# Patient Record
Sex: FEMALE | Race: WHITE | NOT HISPANIC OR LATINO | Employment: PART TIME | URBAN - METROPOLITAN AREA
[De-identification: names, ages, dates, MRNs, and addresses within clinical notes are randomized per-mention and may not be internally consistent; named-entity substitution may affect disease eponyms.]

---

## 2022-10-02 ENCOUNTER — HOSPITAL ENCOUNTER (INPATIENT)
Facility: HOSPITAL | Age: 57
LOS: 3 days | Discharge: HOME HEALTH | DRG: 617 | End: 2022-10-07
Attending: EMERGENCY MEDICINE | Admitting: STUDENT IN AN ORGANIZED HEALTH CARE EDUCATION/TRAINING PROGRAM
Payer: COMMERCIAL

## 2022-10-02 ENCOUNTER — OFFICE VISIT (OUTPATIENT)
Dept: URGENT CARE | Facility: CLINIC | Age: 57
DRG: 617 | End: 2022-10-02
Payer: COMMERCIAL

## 2022-10-02 VITALS
SYSTOLIC BLOOD PRESSURE: 144 MMHG | HEART RATE: 103 BPM | RESPIRATION RATE: 20 BRPM | BODY MASS INDEX: 27.89 KG/M2 | TEMPERATURE: 98 F | WEIGHT: 184 LBS | HEIGHT: 68 IN | DIASTOLIC BLOOD PRESSURE: 87 MMHG | OXYGEN SATURATION: 100 %

## 2022-10-02 DIAGNOSIS — L08.9 DIABETIC INFECTION OF RIGHT FOOT: Primary | ICD-10-CM

## 2022-10-02 DIAGNOSIS — L08.9 TOE INFECTION: Primary | ICD-10-CM

## 2022-10-02 DIAGNOSIS — I82.409 DVT (DEEP VENOUS THROMBOSIS): ICD-10-CM

## 2022-10-02 DIAGNOSIS — M79.674 PAIN OF TOE OF RIGHT FOOT: ICD-10-CM

## 2022-10-02 DIAGNOSIS — E11.9 DIABETES MELLITUS, NEW ONSET: ICD-10-CM

## 2022-10-02 DIAGNOSIS — E11.628 DIABETIC INFECTION OF RIGHT FOOT: Primary | ICD-10-CM

## 2022-10-02 DIAGNOSIS — L03.90 CELLULITIS: ICD-10-CM

## 2022-10-02 DIAGNOSIS — M86.9 OSTEOMYELITIS: ICD-10-CM

## 2022-10-02 DIAGNOSIS — M86.9 OSTEOMYELITIS OF TOE: ICD-10-CM

## 2022-10-02 LAB
ALBUMIN SERPL-MCNC: 3.6 GM/DL (ref 3.5–5)
ALBUMIN/GLOB SERPL: 0.7 RATIO (ref 1.1–2)
ALP SERPL-CCNC: 119 UNIT/L (ref 40–150)
ALT SERPL-CCNC: 13 UNIT/L (ref 0–55)
AST SERPL-CCNC: 15 UNIT/L (ref 5–34)
BASOPHILS # BLD AUTO: 0.03 X10(3)/MCL (ref 0–0.2)
BASOPHILS NFR BLD AUTO: 0.4 %
BILIRUBIN DIRECT+TOT PNL SERPL-MCNC: 0.8 MG/DL
BUN SERPL-MCNC: 10.5 MG/DL (ref 9.8–20.1)
CALCIUM SERPL-MCNC: 10.8 MG/DL (ref 8.4–10.2)
CHLORIDE SERPL-SCNC: 96 MMOL/L (ref 98–107)
CO2 SERPL-SCNC: 25 MMOL/L (ref 22–29)
CREAT SERPL-MCNC: 0.72 MG/DL (ref 0.55–1.02)
CRP SERPL-MCNC: 185 MG/L
EOSINOPHIL # BLD AUTO: 0.17 X10(3)/MCL (ref 0–0.9)
EOSINOPHIL NFR BLD AUTO: 2.1 %
ERYTHROCYTE [DISTWIDTH] IN BLOOD BY AUTOMATED COUNT: 11.6 % (ref 11.5–17)
ERYTHROCYTE [SEDIMENTATION RATE] IN BLOOD: 70 MM/HR (ref 0–20)
EST. AVERAGE GLUCOSE BLD GHB EST-MCNC: 263.3 MG/DL
GFR SERPLBLD CREATININE-BSD FMLA CKD-EPI: >60 MLS/MIN/1.73/M2
GLOBULIN SER-MCNC: 5.1 GM/DL (ref 2.4–3.5)
GLUCOSE SERPL-MCNC: 234 MG/DL (ref 74–100)
HBA1C MFR BLD: 10.8 %
HCT VFR BLD AUTO: 47.4 % (ref 37–47)
HGB BLD-MCNC: 15.8 GM/DL (ref 12–16)
IMM GRANULOCYTES # BLD AUTO: 0.02 X10(3)/MCL (ref 0–0.04)
IMM GRANULOCYTES NFR BLD AUTO: 0.3 %
LYMPHOCYTES # BLD AUTO: 2.37 X10(3)/MCL (ref 0.6–4.6)
LYMPHOCYTES NFR BLD AUTO: 29.7 %
MCH RBC QN AUTO: 29.9 PG (ref 27–31)
MCHC RBC AUTO-ENTMCNC: 33.3 MG/DL (ref 33–36)
MCV RBC AUTO: 89.8 FL (ref 80–94)
MONOCYTES # BLD AUTO: 0.68 X10(3)/MCL (ref 0.1–1.3)
MONOCYTES NFR BLD AUTO: 8.5 %
NEUTROPHILS # BLD AUTO: 4.7 X10(3)/MCL (ref 2.1–9.2)
NEUTROPHILS NFR BLD AUTO: 59 %
NRBC BLD AUTO-RTO: 0 %
PLATELET # BLD AUTO: 276 X10(3)/MCL (ref 130–400)
PMV BLD AUTO: 11.3 FL (ref 7.4–10.4)
POCT GLUCOSE: 192 MG/DL (ref 70–110)
POCT GLUCOSE: 210 MG/DL (ref 70–110)
POCT GLUCOSE: 221 MG/DL (ref 70–110)
POTASSIUM SERPL-SCNC: 3.7 MMOL/L (ref 3.5–5.1)
PROT SERPL-MCNC: 8.7 GM/DL (ref 6.4–8.3)
RBC # BLD AUTO: 5.28 X10(6)/MCL (ref 4.2–5.4)
SARS-COV-2 RDRP RESP QL NAA+PROBE: NEGATIVE
SODIUM SERPL-SCNC: 139 MMOL/L (ref 136–145)
WBC # SPEC AUTO: 8 X10(3)/MCL (ref 4.5–11.5)

## 2022-10-02 PROCEDURE — 85651 RBC SED RATE NONAUTOMATED: CPT | Performed by: EMERGENCY MEDICINE

## 2022-10-02 PROCEDURE — 63600175 PHARM REV CODE 636 W HCPCS

## 2022-10-02 PROCEDURE — 96372 THER/PROPH/DIAG INJ SC/IM: CPT

## 2022-10-02 PROCEDURE — 87635 SARS-COV-2 COVID-19 AMP PRB: CPT | Performed by: EMERGENCY MEDICINE

## 2022-10-02 PROCEDURE — 25000003 PHARM REV CODE 250

## 2022-10-02 PROCEDURE — G0378 HOSPITAL OBSERVATION PER HR: HCPCS

## 2022-10-02 PROCEDURE — 25000003 PHARM REV CODE 250: Performed by: EMERGENCY MEDICINE

## 2022-10-02 PROCEDURE — 83036 HEMOGLOBIN GLYCOSYLATED A1C: CPT | Performed by: EMERGENCY MEDICINE

## 2022-10-02 PROCEDURE — S0030 INJECTION, METRONIDAZOLE: HCPCS

## 2022-10-02 PROCEDURE — 96361 HYDRATE IV INFUSION ADD-ON: CPT

## 2022-10-02 PROCEDURE — 25500020 PHARM REV CODE 255: Performed by: INTERNAL MEDICINE

## 2022-10-02 PROCEDURE — 99213 OFFICE O/P EST LOW 20 MIN: CPT | Mod: S$PBB,,, | Performed by: NURSE PRACTITIONER

## 2022-10-02 PROCEDURE — 36415 COLL VENOUS BLD VENIPUNCTURE: CPT | Performed by: EMERGENCY MEDICINE

## 2022-10-02 PROCEDURE — 96365 THER/PROPH/DIAG IV INF INIT: CPT

## 2022-10-02 PROCEDURE — 96367 TX/PROPH/DG ADDL SEQ IV INF: CPT

## 2022-10-02 PROCEDURE — 96366 THER/PROPH/DIAG IV INF ADDON: CPT

## 2022-10-02 PROCEDURE — 99285 EMERGENCY DEPT VISIT HI MDM: CPT | Mod: 25,27

## 2022-10-02 PROCEDURE — 94760 N-INVAS EAR/PLS OXIMETRY 1: CPT

## 2022-10-02 PROCEDURE — 99213 PR OFFICE/OUTPT VISIT, EST, LEVL III, 20-29 MIN: ICD-10-PCS | Mod: S$PBB,,, | Performed by: NURSE PRACTITIONER

## 2022-10-02 PROCEDURE — 80053 COMPREHEN METABOLIC PANEL: CPT | Performed by: EMERGENCY MEDICINE

## 2022-10-02 PROCEDURE — 99204 OFFICE O/P NEW MOD 45 MIN: CPT | Mod: PBBFAC,25 | Performed by: NURSE PRACTITIONER

## 2022-10-02 PROCEDURE — 82962 GLUCOSE BLOOD TEST: CPT

## 2022-10-02 PROCEDURE — 86140 C-REACTIVE PROTEIN: CPT | Performed by: EMERGENCY MEDICINE

## 2022-10-02 PROCEDURE — 63600175 PHARM REV CODE 636 W HCPCS: Performed by: EMERGENCY MEDICINE

## 2022-10-02 PROCEDURE — 85025 COMPLETE CBC W/AUTO DIFF WBC: CPT | Performed by: EMERGENCY MEDICINE

## 2022-10-02 PROCEDURE — 87040 BLOOD CULTURE FOR BACTERIA: CPT | Performed by: EMERGENCY MEDICINE

## 2022-10-02 RX ORDER — IBUPROFEN 200 MG
24 TABLET ORAL
Status: DISCONTINUED | OUTPATIENT
Start: 2022-10-02 | End: 2022-10-07 | Stop reason: HOSPADM

## 2022-10-02 RX ORDER — ACETAMINOPHEN 325 MG/1
650 TABLET ORAL EVERY 6 HOURS PRN
Status: DISCONTINUED | OUTPATIENT
Start: 2022-10-02 | End: 2022-10-07 | Stop reason: HOSPADM

## 2022-10-02 RX ORDER — ENOXAPARIN SODIUM 100 MG/ML
40 INJECTION SUBCUTANEOUS EVERY 24 HOURS
Status: DISCONTINUED | OUTPATIENT
Start: 2022-10-02 | End: 2022-10-07 | Stop reason: HOSPADM

## 2022-10-02 RX ORDER — LABETALOL HCL 20 MG/4 ML
10 SYRINGE (ML) INTRAVENOUS
Status: DISCONTINUED | OUTPATIENT
Start: 2022-10-02 | End: 2022-10-07 | Stop reason: HOSPADM

## 2022-10-02 RX ORDER — INSULIN ASPART 100 [IU]/ML
5 INJECTION, SOLUTION INTRAVENOUS; SUBCUTANEOUS
Status: DISCONTINUED | OUTPATIENT
Start: 2022-10-02 | End: 2022-10-07 | Stop reason: HOSPADM

## 2022-10-02 RX ORDER — SODIUM CHLORIDE 0.9 % (FLUSH) 0.9 %
10 SYRINGE (ML) INJECTION
Status: DISCONTINUED | OUTPATIENT
Start: 2022-10-02 | End: 2022-10-07 | Stop reason: HOSPADM

## 2022-10-02 RX ORDER — SODIUM CHLORIDE 9 MG/ML
INJECTION, SOLUTION INTRAVENOUS CONTINUOUS
Status: DISCONTINUED | OUTPATIENT
Start: 2022-10-02 | End: 2022-10-06

## 2022-10-02 RX ORDER — HYDRALAZINE HYDROCHLORIDE 20 MG/ML
10 INJECTION INTRAMUSCULAR; INTRAVENOUS
Status: DISCONTINUED | OUTPATIENT
Start: 2022-10-02 | End: 2022-10-07 | Stop reason: HOSPADM

## 2022-10-02 RX ORDER — GLUCAGON 1 MG
1 KIT INJECTION
Status: DISCONTINUED | OUTPATIENT
Start: 2022-10-02 | End: 2022-10-07 | Stop reason: HOSPADM

## 2022-10-02 RX ORDER — IBUPROFEN 200 MG
16 TABLET ORAL
Status: DISCONTINUED | OUTPATIENT
Start: 2022-10-02 | End: 2022-10-07 | Stop reason: HOSPADM

## 2022-10-02 RX ORDER — INSULIN ASPART 100 [IU]/ML
0-5 INJECTION, SOLUTION INTRAVENOUS; SUBCUTANEOUS
Status: DISCONTINUED | OUTPATIENT
Start: 2022-10-02 | End: 2022-10-07 | Stop reason: HOSPADM

## 2022-10-02 RX ORDER — TALC
6 POWDER (GRAM) TOPICAL NIGHTLY PRN
Status: DISCONTINUED | OUTPATIENT
Start: 2022-10-02 | End: 2022-10-07 | Stop reason: HOSPADM

## 2022-10-02 RX ORDER — SODIUM CHLORIDE 9 MG/ML
1000 INJECTION, SOLUTION INTRAVENOUS ONCE
Status: COMPLETED | OUTPATIENT
Start: 2022-10-02 | End: 2022-10-02

## 2022-10-02 RX ORDER — OXYCODONE HYDROCHLORIDE 5 MG/1
5 TABLET ORAL EVERY 6 HOURS PRN
Status: DISCONTINUED | OUTPATIENT
Start: 2022-10-02 | End: 2022-10-07 | Stop reason: HOSPADM

## 2022-10-02 RX ORDER — METRONIDAZOLE 500 MG/100ML
500 INJECTION, SOLUTION INTRAVENOUS
Status: DISCONTINUED | OUTPATIENT
Start: 2022-10-02 | End: 2022-10-07 | Stop reason: HOSPADM

## 2022-10-02 RX ORDER — OXYCODONE HYDROCHLORIDE 5 MG/1
10 TABLET ORAL EVERY 6 HOURS PRN
Status: DISCONTINUED | OUTPATIENT
Start: 2022-10-02 | End: 2022-10-07 | Stop reason: HOSPADM

## 2022-10-02 RX ORDER — HYDROMORPHONE HYDROCHLORIDE 1 MG/ML
1 INJECTION, SOLUTION INTRAMUSCULAR; INTRAVENOUS; SUBCUTANEOUS EVERY 6 HOURS PRN
Status: DISCONTINUED | OUTPATIENT
Start: 2022-10-02 | End: 2022-10-07 | Stop reason: HOSPADM

## 2022-10-02 RX ADMIN — VANCOMYCIN HYDROCHLORIDE 1500 MG: 1 INJECTION, POWDER, LYOPHILIZED, FOR SOLUTION INTRAVENOUS at 03:10

## 2022-10-02 RX ADMIN — CEFEPIME 1 G: 1 INJECTION, POWDER, FOR SOLUTION INTRAMUSCULAR; INTRAVENOUS at 01:10

## 2022-10-02 RX ADMIN — METRONIDAZOLE 500 MG: 5 INJECTION, SOLUTION INTRAVENOUS at 05:10

## 2022-10-02 RX ADMIN — INSULIN ASPART 4 UNITS: 100 INJECTION, SOLUTION INTRAVENOUS; SUBCUTANEOUS at 05:10

## 2022-10-02 RX ADMIN — INSULIN DETEMIR 10 UNITS: 100 INJECTION, SOLUTION SUBCUTANEOUS at 09:10

## 2022-10-02 RX ADMIN — SODIUM CHLORIDE 1000 ML: 9 INJECTION, SOLUTION INTRAVENOUS at 05:10

## 2022-10-02 RX ADMIN — SODIUM CHLORIDE: 9 INJECTION, SOLUTION INTRAVENOUS at 07:10

## 2022-10-02 RX ADMIN — IOPAMIDOL 100 ML: 755 INJECTION, SOLUTION INTRAVENOUS at 05:10

## 2022-10-02 RX ADMIN — ENOXAPARIN SODIUM 40 MG: 40 INJECTION SUBCUTANEOUS at 05:10

## 2022-10-02 RX ADMIN — INSULIN ASPART 2 UNITS: 100 INJECTION, SOLUTION INTRAVENOUS; SUBCUTANEOUS at 09:10

## 2022-10-02 RX ADMIN — CEFEPIME 2 G: 2 INJECTION, POWDER, FOR SOLUTION INTRAVENOUS at 09:10

## 2022-10-02 NOTE — ED PROVIDER NOTES
"Encounter Date: 10/2/2022       History     Chief Complaint   Patient presents with    foot pain     Sent from urgent care for right 3rd toe infection to rule out osteomyelitis. Noticed a "sore" on it since 9/18.     She had 3 children and and gestational diabetes, no other significant past medical or surgical history but has not had any healthcare contact in many years.  Nonsmoker, nondrinker, takes no medications, no allergies.  Cares for her  who has had a stroke and 1 son who has had a brain injury.  Recent trouble with a bunion on her right middle toe, possibly exacerbated by a recent drive and she became aware in recent days of some soreness of the right lower leg and 3rd toe, severe infection has developed involving the 2nd and 3rd toes at this point.  It is a little unclear when she 1st developed any lesion but she says she has had some type of sore that seem to be getting better and worse for at least 2 or 3 weeks.  No fever, chills, or sweats.  Seen at a local urgent care and referred here for further evaluation.  No recent antibiotic exposure.  No other complaints.    The history is provided by the patient. No  was used.   Review of patient's allergies indicates:  No Known Allergies  No past medical history on file.  No past surgical history on file.  No family history on file.  Social History     Tobacco Use    Smoking status: Never    Smokeless tobacco: Never   Substance Use Topics    Alcohol use: Not Currently    Drug use: Never     Review of Systems   Constitutional:  Negative for activity change, fatigue and fever.   HENT:  Negative for congestion, ear pain, facial swelling, nosebleeds, sinus pressure and sore throat.    Eyes:  Negative for pain, discharge, redness and visual disturbance.   Respiratory:  Negative for cough, choking, chest tightness, shortness of breath and wheezing.    Cardiovascular:  Negative for chest pain, palpitations and leg swelling. "   Gastrointestinal:  Negative for abdominal distention, abdominal pain, nausea and vomiting.   Endocrine: Negative for heat intolerance, polydipsia and polyuria.   Genitourinary:  Negative for difficulty urinating, dysuria, flank pain, hematuria and urgency.   Musculoskeletal:  Negative for back pain, gait problem, joint swelling and myalgias.   Skin:  Positive for color change and wound. Negative for rash.   Allergic/Immunologic: Negative for environmental allergies and food allergies.   Neurological:  Negative for dizziness, weakness, numbness and headaches.   Hematological:  Negative for adenopathy. Does not bruise/bleed easily.   Psychiatric/Behavioral:  Negative for agitation and behavioral problems. The patient is not nervous/anxious.    All other systems reviewed and are negative.    Physical Exam     Initial Vitals [10/02/22 1214]   BP Pulse Resp Temp SpO2   (!) 151/103 108 20 98.2 °F (36.8 °C) 100 %      MAP       --         Physical Exam    Nursing note and vitals reviewed.  Constitutional: She appears well-developed and well-nourished. She is not diaphoretic. No distress.   HENT:   Head: Normocephalic and atraumatic.   Mouth/Throat: No oropharyngeal exudate.   Eyes: Conjunctivae and EOM are normal. Pupils are equal, round, and reactive to light. Right eye exhibits no discharge. Left eye exhibits no discharge. No scleral icterus.   Neck: Neck supple. No thyromegaly present. No tracheal deviation present. No JVD present.   Normal range of motion.  Cardiovascular:  Normal rate, regular rhythm, normal heart sounds and intact distal pulses.     Exam reveals no gallop and no friction rub.       No murmur heard.  Pulmonary/Chest: Breath sounds normal. No stridor. No respiratory distress. She has no wheezes. She has no rhonchi. She has no rales. She exhibits no tenderness.   Abdominal: Abdomen is soft. Bowel sounds are normal. She exhibits no distension and no mass. There is no abdominal tenderness. There is no  rebound and no guarding.   Musculoskeletal:         General: No tenderness or edema. Normal range of motion.      Cervical back: Normal range of motion and neck supple.     Neurological: She is alert and oriented to person, place, and time. She has normal strength.   Skin: Skin is warm and dry. No rash and no abscess noted. There is erythema.   Fairly severe cellulitis involves the right 2nd toe, fairly severe cellulitis with skin breakdown involves the right middle toe, moderately severe cellulitis of the right foot adjacent to the base of both of those toes with mild cellulitis changes extending proximally involving most of the foot and part of the lower leg on the right.  No gas or crepitus.  No lymphangitis.  No focal abscess.  Some underlying bunion deformity changes evident of the middle toe.  Exam is concerning for underlying osteomyelitis. See photos.    Psychiatric: She has a normal mood and affect. Her behavior is normal. Judgment and thought content normal.                       ED Course   Procedures  Labs Reviewed   COMPREHENSIVE METABOLIC PANEL - Abnormal; Notable for the following components:       Result Value    Chloride 96 (*)     Glucose Level 234 (*)     Calcium Level Total 10.8 (*)     Protein Total 8.7 (*)     Globulin 5.1 (*)     Albumin/Globulin Ratio 0.7 (*)     All other components within normal limits   SEDIMENTATION RATE, AUTOMATED - Abnormal; Notable for the following components:    Sed Rate 70 (*)     All other components within normal limits   C-REACTIVE PROTEIN - Abnormal; Notable for the following components:    C-Reactive Protein 185.00 (*)     All other components within normal limits   HEMOGLOBIN A1C - Abnormal; Notable for the following components:    Hemoglobin A1c 10.8 (*)     All other components within normal limits   CBC WITH DIFFERENTIAL - Abnormal; Notable for the following components:    Hct 47.4 (*)     MPV 11.3 (*)     All other components within normal limits   POCT  GLUCOSE - Abnormal; Notable for the following components:    POCT Glucose 221 (*)     All other components within normal limits   SARS-COV-2 RNA AMPLIFICATION, QUAL - Normal   BLOOD CULTURE [facility]   BLOOD CULTURE [facility]   CBC W/ AUTO DIFFERENTIAL    Narrative:     The following orders were created for panel order CBC auto differential.  Procedure                               Abnormality         Status                     ---------                               -----------         ------                     CBC with Differential[[id number]]        Abnormal            Final result                 Please view results for these tests on the individual orders.   EXTRA TUBES    Narrative:     The following orders were created for panel order EXTRA TUBES.  Procedure                               Abnormality         Status                     ---------                               -----------         ------                     Light Blue Top Hold[[id number]]                                                         Red Top Hold[[id number]]                                                                Gold Top Hold[[id number]]                                                               Gold Top Hold[[id number]]                                                               Pink Top Hold[[id number]]                                                                 Please view results for these tests on the individual orders.   LIGHT BLUE TOP HOLD   RED TOP HOLD   GOLD TOP HOLD   GOLD TOP HOLD   PINK TOP HOLD   POCT GLUCOSE, HAND-HELD DEVICE          Imaging Results              X-Ray Foot Complete Right (Final result)  Result time 10/02/22 13:51:22      Final result by Kirill Gorman MD (10/02/22 13:51:22)                   Impression:      No acute osseous abnormality identified.      Electronically signed by: Kirill Gorman  Date:    10/02/2022  Time:    13:51               Narrative:    EXAMINATION:  XR FOOT COMPLETE 3 VIEW  RIGHT    CLINICAL HISTORY:  Cellulitis, unspecified    TECHNIQUE:  Three views    COMPARISON:  None available    FINDINGS:  There is prominent hallux valgus deformity.  Degenerative osteoarthritic changes involve the tarsometatarsal articulations and there is a prominent calcaneal enthesophyte.  No osteolytic destructive changes identified to suggest osteomyelitis.  There are soft tissue inflammations with scattered small air locules.                                       Medications   ceFEPIme (MAXIPIME) 1 g in sodium chloride 0.9 % 50 mL IVPB (MB+) (1 g Intravenous New Bag 10/2/22 3667)       2:13 PM Discussed with Internal Medicine, will see in the ER and admit.  Although plain films do not yet show destructive changes, high likelihood that there is early osteomyelitis already developed.  Counseled patient in detail.                             Clinical Impression:   Final diagnoses:  [L03.90] Cellulitis - right foot & lower leg  [E11.628, L08.9] Diabetic infection of right foot (Primary)  [E11.9] Diabetes mellitus, new onset      ED Disposition Condition    Admit Stable                Bandar Yousif MD  10/02/22 7878

## 2022-10-02 NOTE — CONSULTS
[facility] General Surgery Service  ADMIT / CONSULT / H&P NOTE  Date: 10/2/2022    CC:   Maribel Araiza is a 57 y.o. female presenting with a wound of the right 3rd toe with surrounding cellulitis. General surgery consulted for concern of underlying osteomyelitis.    HPI:   Maribel Araiza is a 56 yo female with a PMH of gestational diabetes, and current diabetes (diagnosed on this visit, a1c 10.8) who presented to ED 10/2 with a right 3rd toe wound and surrounding cellulitis. Patient states she first noticed the wound last night (10/1) and it has gotten worse. Patient has a hx of wounds/sores to toes on that foot due to a bunion on that foot crowding her toes in shoes. Patient notes that she has been placing Neosporin on wound. Patient in no acute distress during interview, however is under a great deal of emotional stress currently due to very recent family factors. Denies f/c, n/v, abdominal pain, chest pain, diarrhea, cough, SOB.    ROS:  Review of Systems   Constitutional:  Negative for chills and fever.   HENT:  Negative for congestion and sore throat.    Eyes:  Negative for blurred vision and double vision.   Respiratory:  Negative for cough and shortness of breath.    Cardiovascular:  Negative for chest pain and claudication.   Gastrointestinal:  Negative for abdominal pain, constipation, diarrhea, heartburn, nausea and vomiting.   Genitourinary:  Negative for dysuria and urgency.   Musculoskeletal:  Negative for falls and myalgias.   Skin:  Negative for rash.        Wound to right 3rd toe   Neurological:  Negative for dizziness, tingling, sensory change and headaches.   Endo/Heme/Allergies:  Does not bruise/bleed easily.     PMH:   No past medical history on file.    PSH:   No past surgical history on file.    FamHx:   No family history on file.    SocHx:  Social History     Socioeconomic History    Marital status:    Tobacco Use    Smoking status: Never    Smokeless tobacco: Never   Substance and Sexual  "Activity    Alcohol use: Not Currently    Drug use: Never       Allergies:   Review of patient's allergies indicates:  No Known Allergies    Medications:  No current facility-administered medications on file prior to encounter.     No current outpatient medications on file prior to encounter.     Objective:    VITAL SIGNS: 24 HR MIN & MAX LAST    Temp  Min: 98.1 °F (36.7 °C)  Max: 98.6 °F (37 °C)  98.1 °F (36.7 °C)        BP  Min: 141/88  Max: 151/103  (!) 148/88     Pulse  Min: 92  Max: 108  97     Resp  Min: 20  Max: 20  20    SpO2  Min: 99 %  Max: 100 %  99 %      HT: 5' 8" (172.7 cm)  WT: 83.5 kg (184 lb)  BMI: 28       Physical Exam:  Gen: NAD, AAOx3  CV: extremities wwp, regular rate, palpable radials  Pulm: nonlabored, no increased wob, equal chest rise b/l   Abd: s/nt/nd   Wounds: Right 3rd toe with large white wound on dorsal surface appearing like purulence at first glance. Unable to express purulent fluid from wound with pressure and with breaking skin. Skin appears macerated likely due to Neosporin use. Patient denies pain throughout exam. Additionally, right 4th toe swollen and erythematous without open wound/drainage    Results  Recent Labs   Lab 10/02/22  1318 10/02/22  1327   WBC 8.0  --    HGB 15.8  --    HCT 47.4*  --      --    NA  --  139   CHLORIDE  --  96*   CO2  --  25   BUN  --  10.5   CREATININE  --  0.72   GLUCOSE  --  234*   CALCIUM  --  10.8*   ALKPHOS  --  119       Imaging:  Right Foot X-ray 10/2  There is prominent hallux valgus deformity.  Degenerative osteoarthritic changes involve the tarsometatarsal articulations and there is a prominent calcaneal enthesophyte.  No osteolytic destructive changes identified to suggest osteomyelitis.  There are soft tissue inflammations with scattered small air locules.     Impression:  No acute osseous abnormality identified.    A/P:   Maribel Ariaza is a 57 y.o. female with a PMH of gestational diabetes and recently diagnosed DM2 noted during " this visit who presents with a right 3rd toe wound with surrounding cellulitis.    - Agree with primary team abx  - No acute surgical intervention at this time  - X-ray shows no signs of osteo, however will await MRI results for further planning  - Will continue to follow  - Will consult wound care tomorrow morning  - Ordered scaling pain regimen    Kevin Carnes MD  [employer] General Surgery PGY-1

## 2022-10-02 NOTE — PROGRESS NOTES
"Subjective:       Patient ID: Maribel Araiza is a 57 y.o. female.    Vitals:  height is 5' 8" (1.727 m) and weight is 83.5 kg (184 lb). Her oral temperature is 98.4 °F (36.9 °C). Her blood pressure is 144/87 (abnormal) and her pulse is 103. Her respiration is 20 and oxygen saturation is 100%.     Chief Complaint: Right 3rd toe sore (Swelling, pain, open wound)    Patient is a 57-year-old female, here today for right 3rd toe swelling, pain that she states has been there over a week.  Patient unsure exactly when it started.  States she does get source to her feet, but they usually heal on their own.  States she became concerned because the toe next to it started swelling and she started having some swelling to her calf area.       Constitution: Negative.   Cardiovascular: Negative.    Respiratory: Negative.     Skin:  Positive for wound.     Objective:      Physical Exam   Constitutional: She is oriented to person, place, and time. She appears well-developed.   HENT:   Head: Normocephalic.   Eyes: Conjunctivae and EOM are normal. Pupils are equal, round, and reactive to light.   Neck: Neck supple.   Cardiovascular: Normal rate, regular rhythm and normal heart sounds.   Pulmonary/Chest: Effort normal and breath sounds normal.   Musculoskeletal: Normal range of motion.         General: Normal range of motion.   Neurological: She is alert and oriented to person, place, and time.   Skin: Skin is warm.         Comments: Wound to R 3rd toe, see photo.    Psychiatric: Her behavior is normal.   Vitals reviewed.                Assessment:       1. Toe infection    2. Pain of toe of right foot            No results found for any previous visit.        No results found.   Plan:         Discussed with patient, will transfer to ED for further eval/treatment.  [place] [facility] [place]    Toe infection  -     Refer to Emergency Dept.    Pain of toe of right foot  -     Refer to Emergency Dept.             "

## 2022-10-02 NOTE — H&P
"[facility] Medicine Wards   History & Physical Note     Resident Team: [facility] Medicine List 1  Attending Physician: Deo Brunner MD  Resident: Arden Grajeda MD  Intern: Dayton Anton     Date of Admit: 10/2/2022    Chief Complaint:     foot pain (Sent from urgent care for right 3rd toe infection to rule out osteomyelitis. Noticed a "sore" on it since .)       Subjective:      History of Present Illness:  Maribel Araiza is a 57 y.o. female with a history of gestational diabetes who presented to [facility] ED on 10/2/2022  with complaint of right foot 3rd toe wound. Patient reports that she first noticed the toe wound yesterday night. She did note notice the wound the night prior but does not having a foot sore in the same area a couple weeks ago. She normally has foot sores in that area due to her arched feet that scrapes against her shoes. She says the sores come and go but have never gotten this bad. She denies any pain in her toes but does complain of pain in her right calf. She says that she also get some swelling in her feet when she goes for a long drive. Of note, patient recently returned from [place] for her father's  which was a 15 hr drive this past . Patient was in emotional distress during the interview due to other family issues as well. Patient reports no past medical history except gestational diabetes during her pregnancies. She has not seen a physician in years. She says she does not smoke, only drinks alcohol socially, and denies any other drug use. She denies having any fevers, chills, SOB, chest pain, abdominal pain, dysuria, constipation or diarrhea.     Past Medical History:   has no past medical history on file.     Past Surgical History:   has no past surgical history on file.     Family History:  family history is not on file.     Social History:   reports that she has never smoked. She has never used smokeless tobacco. She reports that she does not currently use alcohol. She " reports that she does not use drugs.     Allergies:  has No Known Allergies.     Home Medications:  Prior to Admission medications    Not on File     Review of Systems:  Review of Systems   All other systems reviewed and are negative.     Objective:     Vital Signs (Most Recent):  Temp: 98.6 °F (37 °C) (10/02/22 1244)  Pulse: 92 (10/02/22 1244)  Resp: 20 (10/02/22 1244)  BP: (!) 141/88 (10/02/22 1244)  SpO2: 100 % (10/02/22 1214)   Vital Signs (24h Range):  Temp:  [98.2 °F (36.8 °C)-98.6 °F (37 °C)] 98.6 °F (37 °C)  Pulse:  [] 92  Resp:  [20] 20  SpO2:  [100 %] 100 %  BP: (141-151)/() 141/88     Physical Examination:  Physical Exam  Vitals reviewed.   Constitutional:       Appearance: Normal appearance.   HENT:      Head: Normocephalic and atraumatic.   Eyes:      Extraocular Movements: Extraocular movements intact.      Conjunctiva/sclera: Conjunctivae normal.      Pupils: Pupils are equal, round, and reactive to light.   Cardiovascular:      Rate and Rhythm: Regular rhythm. Tachycardia present.      Pulses: Normal pulses.      Heart sounds: No murmur heard.  Pulmonary:      Effort: Pulmonary effort is normal. No respiratory distress.      Breath sounds: Normal breath sounds.   Chest:      Chest wall: No tenderness.   Abdominal:      General: Abdomen is flat. Bowel sounds are normal. There is no distension.      Palpations: Abdomen is soft.      Tenderness: There is no abdominal tenderness.   Musculoskeletal:         General: Normal range of motion.      Cervical back: Normal range of motion.   Skin:     General: Skin is warm.      Capillary Refill: Capillary refill takes less than 2 seconds.      Comments: Right foot swelling up to mid-calf; right calf mildly tender to palpation; right foot 2nd digit enlarged and erythematous/inflamed; right foot 3rd digit enlarged expressing milky purulent material; toes are not tender to palpation   Neurological:      General: No focal deficit present.      Mental  Status: She is alert and oriented to person, place, and time. Mental status is at baseline.          Laboratory:  Most Recent Data:  CBC:   Recent Labs   Lab 10/02/22  1318   WBC 8.0   HGB 15.8   HCT 47.4*        CMP:   Recent Labs   Lab 10/02/22  1327   CALCIUM 10.8*   ALBUMIN 3.6      K 3.7   CO2 25   BUN 10.5   CREATININE 0.72   ALKPHOS 119   ALT 13   AST 15   BILITOT 0.8     Recent Lab Results         10/02/22  1356   10/02/22  1327   10/02/22  1318   10/02/22  1305        Albumin/Globulin Ratio   0.7           Albumin   3.6           Alkaline Phosphatase   119           ALT   13           AST   15           Baso #     0.03         Basophil %     0.4         BILIRUBIN TOTAL   0.8           BUN   10.5           Calcium   10.8           Chloride   96           CO2   25           ID NOW COVID-19, (IRAIS) Negative             Creatinine   0.72           CRP   185.00           eGFR   >60           Eos #     0.17         Eosinophil %     2.1         Estimated Avg Glucose     263.3         Globulin, Total   5.1           Glucose   234           Hematocrit     47.4         Hemoglobin     15.8         Hemoglobin A1C External     10.8         Immature Grans (Abs)     0.02         Immature Granulocytes     0.3         Lymph #     2.37         LYMPH %     29.7         MCH     29.9         MCHC     33.3         MCV     89.8         Mono #     0.68         Mono %     8.5         MPV     11.3         Neut #     4.7         Neut %     59.0         nRBC     0.0         Platelets     276         POCT Glucose       221       Potassium   3.7           PROTEIN TOTAL   8.7           RBC     5.28         RDW     11.6         Sed Rate   70           Sodium   139           WBC     8.0                 Microbiology Data:  Blood cultures pending    Other Results:    Radiology:  Imaging Results              X-Ray Foot Complete Right (Final result)  Result time 10/02/22 13:51:22      Final result by Kirill Gorman MD (10/02/22  13:51:22)                   Impression:      No acute osseous abnormality identified.      Electronically signed by: Kirill Gorman  Date:    10/02/2022  Time:    13:51               Narrative:    EXAMINATION:  XR FOOT COMPLETE 3 VIEW RIGHT    CLINICAL HISTORY:  Cellulitis, unspecified    TECHNIQUE:  Three views    COMPARISON:  None available    FINDINGS:  There is prominent hallux valgus deformity.  Degenerative osteoarthritic changes involve the tarsometatarsal articulations and there is a prominent calcaneal enthesophyte.  No osteolytic destructive changes identified to suggest osteomyelitis.  There are soft tissue inflammations with scattered small air locules.                                     Lines/Drains/Airways       Peripheral Intravenous Line  Duration                  Peripheral IV - Double Lumen 10/02/22 1230 20 G Anterior;Right Forearm <1 day                  Assessment & Plan:     Right foot cellulitis vs osteomyelitis  - Patient reports acute worsening of right foot sore yesterday  - SIRS 2/4: Afebrile, tachycardic 108, tachypneic 20, WBC 8 on admission  - Elevated inflammatory markers ESR 70 and   - A1c elevated 10.8  - Blood cultures currently pending  - Right foot x-ray showed no osseus abnormalities  - Given dose of Cefepime and Vanc in the ED  - Ordered b/l venous US LE to assess for DVTs  - Ordered CT and MRI of right foot to assess for possible osteomyelitis  - Started on Vanc, Cefepime, and Flagyl  - Started on NS 1L bolus; then start  cc/hr  - Consulted surgery about possible need for debridement    Hyperglycemia  - Reports history of gestational diabetes  - Does not take medications at home and does not check sugars at home  - A1c in the ED was 10.8  - Started on Detemir 10 units nightly and Aspart 5 units with meals  - Started on low dose sliding scale insulin    High blood pressure  - BP elevated 151/103 in the ED  - Patient was anxious due to recently family circumstances  -  Started on PRN labetalol and hydralazine  - Will continue to monitor      CODE STATUS: Full Code  Access: Peripheral  Antibiotics: Vancomycin, Cefepime, Flagyl  Diet: Diabetic  DVT Prophylaxis: Lovenox  GI Prophylaxis: none needed  Fluids: normal saline 100 ml/hr.     Disposition: day 0 of admission for osteomyelitis workup.    Dayton Anton MD  [facility] Internal Medicine, HO-1     -------------------------------------------------------------    Upper Level Resident Addendum    HPI  -agree with above with addition that her swelling in her RLE is notably worse than what she ussually gets after a long trip. Ussually it goes away after one night of elevating her legs. Her swelling this time has not resolved since returning from [place]. Also, in addition to her father's  this past month, her  is currently admitted to the ICU.    Physical Exam  General - Appears comfortable, appropriatley conversive   Mental Status - alert and oriented x 3, speaking in logical, relevant sentences   HEENT - no rhinorrhea   Cardiac - RRR, no murmurs, rubs, or gallops; no edema in LE   Respiratory - breathing comfortably; clear to ascultation bilaterally   Abdominal - nondistended, soft, nontender to palpation   Extremities - on right foot 3rd digit is edematous and purulent. Her 2nd digit is erythematous and edematous. Her foot and distal calf has erythema as well. She is not tender in her toes or foot. Mild tenderness in posterior calf on palpation  Skin - no rashes or bruises seen on skin    Assessment/Plan    Right foot celllulitis vs osteomyelitis  -Agree with above. Additionally, if MRI is positive for osteomyelitis, then will order BLE Arterial US    Hyperglycemia  -A1C elevated. Likely has new diagnosis of diabetes    High Blood pressure  -agree with above. May be 2/2 undiagnosed HTN, stress response to emotions and/or infection    Concern for RLE DVT  -given trip from [place], she's at increased risk of DVT. Well  "Score "-1". Will hold off on anticoagulation and get Venous US tomorrow morning    Zhen Grajeda PGY 3    "

## 2022-10-02 NOTE — PROGRESS NOTES
Pharmacokinetic Initial Assessment: IV Vancomycin    Assessment/Plan:    Initiate intravenous vancomycin with loading dose of 1500 mg once followed by a maintenance dose of vancomycin 1250mg IV every 12 hours  Desired empiric serum trough concentration is 15 to 20 mcg/mL  Draw vancomycin random level on 10/3/22 at 1500.  Pharmacy will continue to follow and monitor vancomycin.      Please contact pharmacy at extension [phone] with any questions regarding this assessment.     Thank you for the consult,   Tatianna Avila       Patient brief summary:  Maribel Araiza is a 57 y.o. female initiated on antimicrobial therapy with IV Vancomycin for treatment of suspected bone/joint infection    Drug Allergies:   Review of patient's allergies indicates:  No Known Allergies    Actual Body Weight:   83.5kg    Renal Function:   Estimated Creatinine Clearance: 98.8 mL/min (based on SCr of 0.72 mg/dL).,     CBC (last 72 hours):  Recent Labs   Lab Result Units 10/02/22  1318   WBC x10(3)/mcL 8.0   Hgb gm/dL 15.8   Hemoglobin A1c % 10.8*   Hct % 47.4*   Platelet x10(3)/mcL 276   Mono % % 8.5   Eos % % 2.1   Basophil % % 0.4       Metabolic Panel (last 72 hours):  Recent Labs   Lab Result Units 10/02/22  1327   Sodium Level mmol/L 139   Potassium Level mmol/L 3.7   Chloride mmol/L 96*   Carbon Dioxide mmol/L 25   Glucose Level mg/dL 234*   Blood Urea Nitrogen mg/dL 10.5   Creatinine mg/dL 0.72   Albumin Level gm/dL 3.6   Bilirubin Total mg/dL 0.8   Alkaline Phosphatase unit/L 119   Aspartate Aminotransferase unit/L 15   Alanine Aminotransferase unit/L 13       Microbiologic Results:  Microbiology Results (last 7 days)       Procedure Component Value Units Date/Time    Blood Culture #1 [[id number]] Collected: 10/02/22 1325    Order Status: Sent Specimen: Blood Updated: 10/02/22 1326    Blood Culture #2 [[id number]] Collected: 10/02/22 1325    Order Status: Sent Specimen: Blood Updated: 10/02/22 1326

## 2022-10-03 LAB
ALBUMIN SERPL-MCNC: 2.5 GM/DL (ref 3.5–5)
ALBUMIN/GLOB SERPL: 0.8 RATIO (ref 1.1–2)
ALP SERPL-CCNC: 83 UNIT/L (ref 40–150)
ALT SERPL-CCNC: 10 UNIT/L (ref 0–55)
AST SERPL-CCNC: 15 UNIT/L (ref 5–34)
BASOPHILS # BLD AUTO: 0.03 X10(3)/MCL (ref 0–0.2)
BASOPHILS NFR BLD AUTO: 0.5 %
BILIRUBIN DIRECT+TOT PNL SERPL-MCNC: 0.3 MG/DL
BUN SERPL-MCNC: 9.6 MG/DL (ref 9.8–20.1)
CALCIUM SERPL-MCNC: 8.7 MG/DL (ref 8.4–10.2)
CHLORIDE SERPL-SCNC: 108 MMOL/L (ref 98–107)
CO2 SERPL-SCNC: 24 MMOL/L (ref 22–29)
CREAT SERPL-MCNC: 0.55 MG/DL (ref 0.55–1.02)
EOSINOPHIL # BLD AUTO: 0.2 X10(3)/MCL (ref 0–0.9)
EOSINOPHIL NFR BLD AUTO: 3.5 %
ERYTHROCYTE [DISTWIDTH] IN BLOOD BY AUTOMATED COUNT: 11.4 % (ref 11.5–17)
GFR SERPLBLD CREATININE-BSD FMLA CKD-EPI: >60 MLS/MIN/1.73/M2
GLOBULIN SER-MCNC: 3.1 GM/DL (ref 2.4–3.5)
GLUCOSE SERPL-MCNC: 133 MG/DL (ref 74–100)
HCT VFR BLD AUTO: 35.5 % (ref 37–47)
HGB BLD-MCNC: 12 GM/DL (ref 12–16)
IMM GRANULOCYTES # BLD AUTO: 0.02 X10(3)/MCL (ref 0–0.04)
IMM GRANULOCYTES NFR BLD AUTO: 0.4 %
LYMPHOCYTES # BLD AUTO: 2.17 X10(3)/MCL (ref 0.6–4.6)
LYMPHOCYTES NFR BLD AUTO: 38.1 %
MAGNESIUM SERPL-MCNC: 2.3 MG/DL (ref 1.6–2.6)
MCH RBC QN AUTO: 30.2 PG (ref 27–31)
MCHC RBC AUTO-ENTMCNC: 33.8 MG/DL (ref 33–36)
MCV RBC AUTO: 89.2 FL (ref 80–94)
MONOCYTES # BLD AUTO: 0.56 X10(3)/MCL (ref 0.1–1.3)
MONOCYTES NFR BLD AUTO: 9.8 %
NEUTROPHILS # BLD AUTO: 2.7 X10(3)/MCL (ref 2.1–9.2)
NEUTROPHILS NFR BLD AUTO: 47.7 %
NRBC BLD AUTO-RTO: 0 %
PHOSPHATE SERPL-MCNC: 3.6 MG/DL (ref 2.3–4.7)
PLATELET # BLD AUTO: 264 X10(3)/MCL (ref 130–400)
PMV BLD AUTO: 10.6 FL (ref 7.4–10.4)
POCT GLUCOSE: 126 MG/DL (ref 70–110)
POCT GLUCOSE: 146 MG/DL (ref 70–110)
POCT GLUCOSE: 189 MG/DL (ref 70–110)
POCT GLUCOSE: 281 MG/DL (ref 70–110)
POTASSIUM SERPL-SCNC: 4.1 MMOL/L (ref 3.5–5.1)
PROT SERPL-MCNC: 5.6 GM/DL (ref 6.4–8.3)
RBC # BLD AUTO: 3.98 X10(6)/MCL (ref 4.2–5.4)
SODIUM SERPL-SCNC: 140 MMOL/L (ref 136–145)
VANCOMYCIN SERPL-MCNC: 7.7 UG/ML (ref 15–20)
WBC # SPEC AUTO: 5.7 X10(3)/MCL (ref 4.5–11.5)

## 2022-10-03 PROCEDURE — G0378 HOSPITAL OBSERVATION PER HR: HCPCS

## 2022-10-03 PROCEDURE — 96367 TX/PROPH/DG ADDL SEQ IV INF: CPT

## 2022-10-03 PROCEDURE — 94760 N-INVAS EAR/PLS OXIMETRY 1: CPT

## 2022-10-03 PROCEDURE — 25500020 PHARM REV CODE 255

## 2022-10-03 PROCEDURE — 84100 ASSAY OF PHOSPHORUS: CPT

## 2022-10-03 PROCEDURE — 96365 THER/PROPH/DIAG IV INF INIT: CPT | Mod: 59

## 2022-10-03 PROCEDURE — 80202 ASSAY OF VANCOMYCIN: CPT

## 2022-10-03 PROCEDURE — 94761 N-INVAS EAR/PLS OXIMETRY MLT: CPT

## 2022-10-03 PROCEDURE — 25000003 PHARM REV CODE 250

## 2022-10-03 PROCEDURE — S0030 INJECTION, METRONIDAZOLE: HCPCS

## 2022-10-03 PROCEDURE — 96366 THER/PROPH/DIAG IV INF ADDON: CPT

## 2022-10-03 PROCEDURE — 83735 ASSAY OF MAGNESIUM: CPT

## 2022-10-03 PROCEDURE — 63600175 PHARM REV CODE 636 W HCPCS

## 2022-10-03 PROCEDURE — 96361 HYDRATE IV INFUSION ADD-ON: CPT

## 2022-10-03 PROCEDURE — A9577 INJ MULTIHANCE: HCPCS

## 2022-10-03 PROCEDURE — 96372 THER/PROPH/DIAG INJ SC/IM: CPT

## 2022-10-03 PROCEDURE — 80053 COMPREHEN METABOLIC PANEL: CPT

## 2022-10-03 PROCEDURE — 36415 COLL VENOUS BLD VENIPUNCTURE: CPT

## 2022-10-03 PROCEDURE — 85025 COMPLETE CBC W/AUTO DIFF WBC: CPT

## 2022-10-03 RX ORDER — LOSARTAN POTASSIUM 25 MG/1
25 TABLET ORAL DAILY
Status: DISCONTINUED | OUTPATIENT
Start: 2022-10-03 | End: 2022-10-06

## 2022-10-03 RX ADMIN — VANCOMYCIN HYDROCHLORIDE 1250 MG: 1 INJECTION, POWDER, LYOPHILIZED, FOR SOLUTION INTRAVENOUS at 03:10

## 2022-10-03 RX ADMIN — METRONIDAZOLE 500 MG: 5 INJECTION, SOLUTION INTRAVENOUS at 08:10

## 2022-10-03 RX ADMIN — METRONIDAZOLE 500 MG: 5 INJECTION, SOLUTION INTRAVENOUS at 12:10

## 2022-10-03 RX ADMIN — CEFEPIME 2 G: 2 INJECTION, POWDER, FOR SOLUTION INTRAVENOUS at 06:10

## 2022-10-03 RX ADMIN — CEFEPIME 2 G: 2 INJECTION, POWDER, FOR SOLUTION INTRAVENOUS at 02:10

## 2022-10-03 RX ADMIN — SODIUM CHLORIDE: 9 INJECTION, SOLUTION INTRAVENOUS at 03:10

## 2022-10-03 RX ADMIN — INSULIN ASPART 2 UNITS: 100 INJECTION, SOLUTION INTRAVENOUS; SUBCUTANEOUS at 11:10

## 2022-10-03 RX ADMIN — INSULIN ASPART 6 UNITS: 100 INJECTION, SOLUTION INTRAVENOUS; SUBCUTANEOUS at 09:10

## 2022-10-03 RX ADMIN — VANCOMYCIN HYDROCHLORIDE 1500 MG: 1 INJECTION, POWDER, LYOPHILIZED, FOR SOLUTION INTRAVENOUS at 05:10

## 2022-10-03 RX ADMIN — INSULIN ASPART 5 UNITS: 100 INJECTION, SOLUTION INTRAVENOUS; SUBCUTANEOUS at 11:10

## 2022-10-03 RX ADMIN — METRONIDAZOLE 500 MG: 5 INJECTION, SOLUTION INTRAVENOUS at 11:10

## 2022-10-03 RX ADMIN — ENOXAPARIN SODIUM 40 MG: 40 INJECTION SUBCUTANEOUS at 04:10

## 2022-10-03 RX ADMIN — GADOBENATE DIMEGLUMINE 18 ML: 529 INJECTION, SOLUTION INTRAVENOUS at 08:10

## 2022-10-03 RX ADMIN — METRONIDAZOLE 500 MG: 5 INJECTION, SOLUTION INTRAVENOUS at 03:10

## 2022-10-03 RX ADMIN — CEFEPIME 2 G: 2 INJECTION, POWDER, FOR SOLUTION INTRAVENOUS at 09:10

## 2022-10-03 RX ADMIN — INSULIN ASPART 5 UNITS: 100 INJECTION, SOLUTION INTRAVENOUS; SUBCUTANEOUS at 04:10

## 2022-10-03 RX ADMIN — INSULIN DETEMIR 10 UNITS: 100 INJECTION, SOLUTION SUBCUTANEOUS at 09:10

## 2022-10-03 RX ADMIN — SODIUM CHLORIDE: 9 INJECTION, SOLUTION INTRAVENOUS at 11:10

## 2022-10-03 RX ADMIN — LOSARTAN POTASSIUM 25 MG: 25 TABLET, FILM COATED ORAL at 11:10

## 2022-10-03 NOTE — PROGRESS NOTES
[facility] Medicine Wards   Progress Note     Resident Team: [facility] Medicine List 1  Attending Physician: Deo Brunner MD  Resident: Arden Grajeda MD  Intern: Dayton Coleman Anton   [facility] Length of Stay: 0 days    Subjective:      Brief HPI:  Maribel Araiza is a 57 y.o. female with a history of gestational diabetes who presented to [facility] ED on 10/2/2022  with complaint of right foot 3rd toe wound. Patient reports that she first noticed the toe wound yesterday night. She did not notice the wound the night prior but does not having a foot sore in the same area a couple weeks ago. She normally has foot sores in that area due to her arched feet that scrapes against her shoes. She says the sores come and go but have never gotten this bad. She denies any pain in her toes but does complain of pain in her right calf. She says that she also get some swelling in her feet when she goes for a long drive. Of note, patient recently returned from [place] for her father's  which was a 15 hr drive this past . Patient was in emotional distress during the interview due to other family issues as well. Patient reports no past medical history except gestational diabetes during her pregnancies. She has not seen a physician in years. She says she does not smoke, only drinks alcohol socially, and denies any other drug use. She denies having any fevers, chills, SOB, chest pain, abdominal pain, dysuria, constipation or diarrhea.     Interval History:   Patient had no acute events overnight. She was started on broad spectrum antibiotics for her infection and tolerated it well. She denies any pain in her bilateral lower extremities. She does not complain of any SOB or chest pain. She has not noticed any new extremity swelling. She denies any fevers or chills. She was much more calm this morning and is awaiting to hear the results of her studies.     Review of Systems   All other systems reviewed and are negative.        Objective:      Vital Signs (Most Recent):  Temp: 98 °F (36.7 °C) (10/03/22 0356)  Pulse: 69 (10/03/22 0356)  Resp: 18 (10/03/22 0356)  BP: (!) 145/84 (10/03/22 0356)  SpO2: 96 % (10/03/22 0356)   Vital Signs (24h Range):  Temp:  [97.7 °F (36.5 °C)-98.6 °F (37 °C)] 98 °F (36.7 °C)  Pulse:  [] 69  Resp:  [18-20] 18  SpO2:  [96 %-100 %] 96 %  BP: (131-151)/() 145/84     Physical Exam  Vitals reviewed.   Constitutional:       Appearance: Normal appearance.   HENT:      Head: Normocephalic and atraumatic.   Eyes:      Extraocular Movements: Extraocular movements intact.      Conjunctiva/sclera: Conjunctivae normal.      Pupils: Pupils are equal, round, and reactive to light.   Cardiovascular:      Rate and Rhythm: Normal rate and regular rhythm.      Pulses: Normal pulses.      Heart sounds: No murmur heard.  Pulmonary:      Effort: Pulmonary effort is normal. No respiratory distress.      Breath sounds: Normal breath sounds.   Chest:      Chest wall: No tenderness.   Abdominal:      General: Abdomen is flat. Bowel sounds are normal. There is no distension.      Palpations: Abdomen is soft.      Tenderness: There is no abdominal tenderness.   Musculoskeletal:         General: Normal range of motion.      Cervical back: Normal range of motion.   Skin:     General: Skin is warm.      Capillary Refill: Capillary refill takes less than 2 seconds.      Comments: Right lower extremity wrapped clean and dry; some right lower swelling present up to mid calf   Neurological:      General: No focal deficit present.      Mental Status: She is alert and oriented to person, place, and time.     Laboratory:  Most Recent Data:  CBC:   Recent Labs   Lab 10/02/22  1318 10/03/22  0356   WBC 8.0 5.7   HGB 15.8 12.0   HCT 47.4* 35.5*    264     CMP:   Recent Labs   Lab 10/03/22  0356   CALCIUM 8.7   ALBUMIN 2.5*      K 4.1   CO2 24   BUN 9.6*   CREATININE 0.55   ALKPHOS 83   ALT 10   AST 15   BILITOT 0.3     All pertinent lab  results from the last 24 hours have been reviewed.      Microbiology Data Reviewed: yes  Pertinent Findings:  Blood cultures pending     Radiology:  Imaging Results              CT Foot W W/O Contrast Right (Final result)  Result time 10/02/22 17:54:27      Final result by Amanuel Ruiz MD (10/02/22 17:54:27)                   Impression:      Findings concerning for early osteomyelitis of the 3rd proximal phalanx.  No rim enhancing fluid collection is appreciated.  Inflammatory soft tissues throughout the distal foot.      Electronically signed by: Amanuel Ruiz  Date:    10/02/2022  Time:    17:54               Narrative:    CLINICAL HISTORY:  Trauma.    TECHNIQUE:  Right foot was performed without  contrast. There are sagittal and coronal reconstructed images available for review.    Automatic exposure control was utilized to reduce the patient's radiation dose.    DLP = 946    COMPARISON:  No prior imaging available for comparison.    FINDINGS:  Soft tissue edema with subcutaneous gas is noted within the soft tissues of the 3rd digit.  Findings concerning for infectious process.  No rim enhancing fluid is appreciated.  The proximal phalanx is sclerotic distally.  Early osteomyelitis is not excluded.  Chronic degenerative changes with subchondral cystic changes throughout the midfoot.                                       X-Ray Foot Complete Right (Final result)  Result time 10/02/22 13:51:22      Final result by Kirill Gorman MD (10/02/22 13:51:22)                   Impression:      No acute osseous abnormality identified.      Electronically signed by: Kirill Gorman  Date:    10/02/2022  Time:    13:51               Narrative:    EXAMINATION:  XR FOOT COMPLETE 3 VIEW RIGHT    CLINICAL HISTORY:  Cellulitis, unspecified    TECHNIQUE:  Three views    COMPARISON:  None available    FINDINGS:  There is prominent hallux valgus deformity.  Degenerative osteoarthritic changes involve the tarsometatarsal  articulations and there is a prominent calcaneal enthesophyte.  No osteolytic destructive changes identified to suggest osteomyelitis.  There are soft tissue inflammations with scattered small air locules.                                    Current Medications:     Infusions:   sodium chloride 0.9% 100 mL/hr at 10/03/22 0352        Scheduled:   ceFEPime (MAXIPIME) IVPB  2 g Intravenous Q8H    enoxaparin  40 mg Subcutaneous Daily    insulin aspart U-100  5 Units Subcutaneous TIDWM    insulin detemir U-100  10 Units Subcutaneous QHS    metronidazole  500 mg Intravenous Q8H    vancomycin (VANCOCIN) IVPB  1,250 mg Intravenous Q12H        PRN:  acetaminophen, dextrose 10%, dextrose 10%, glucagon (human recombinant), glucose, glucose, hydrALAZINE, HYDROmorphone, insulin aspart U-100, labetalol, melatonin, oxyCODONE, oxyCODONE, sodium chloride 0.9%, Pharmacy to dose Vancomycin consult **AND** vancomycin - pharmacy to dose    Antibiotics and Day Number of Therapy:  Vancomycin, Cefepime, and Flagyl Day 2    Intake/Output Summary (Last 24 hours) at 10/3/2022 0659  Last data filed at 10/3/2022 0623  Gross per 24 hour   Intake 1300 ml   Output --   Net 1300 ml     Lines/Drains/Airways       Peripheral Intravenous Line  Duration                  Peripheral IV - Double Lumen 10/02/22 1230 20 G Anterior;Right Forearm <1 day                  Assessment & Plan:     Right foot cellulitis vs osteomyelitis  - Patient reports acute worsening of right foot sore yesterday  - SIRS 2/4: Afebrile, tachycardic 108, tachypneic 20, WBC 8 on admission  - Elevated inflammatory markers ESR 70 and   - A1c elevated 10.8  - Blood cultures currently pending  - Right foot x-ray showed no osseus abnormalities  - Given dose of Cefepime and Vanc in the ED  - Ordered b/l venous US LE to assess for DVTs  - CT right foot showed concern for osteomyelitis of 3rd proximal phalanx  - Pending MRI of right foot to confirm diagnosis of  osteomyelitis  - Started on Vanc, Cefepime, and Flagyl; currently day 2  - Started on NS 1L bolus; then start  cc/hr  - Surgery has been consulted     New onset diabetes  - Reports history of gestational diabetes  - Does not take medications at home and does not check sugars at home  - A1c in the ED was 10.8  - Fasting glucose 133 while on insulin last night  - Started on Detemir 10 units nightly and Aspart 5 units with meals  - Started on low dose sliding scale insulin     High blood pressure  - BP elevated 151/103 in the ED  - Patient was anxious due to recently family circumstances  - Started on PRN labetalol and hydralazine  - Will continue to monitor    CODE STATUS: Full Code  Access: Peripheral  Antibiotics: Vancomycin, Cefepime, Flagyl  Diet: Diabetic  DVT Prophylaxis: Lovenox  GI Prophylaxis: none needed  Fluids: normal saline 100 ml/hr.     Disposition: day 0 of admission for suspected osteomyelitis workup.    Dayton Anton MD  [facility] Internal [facility], [facility]

## 2022-10-03 NOTE — PROGRESS NOTES
Pt not seen today due to testing/imaging; Pt scheduled for u/s to r/o B LE DVT; Will eval as appropriate and advised once results are received.

## 2022-10-03 NOTE — PLAN OF CARE
Problem: Adult Inpatient Plan of Care  Goal: Plan of Care Review  Outcome: Ongoing, Progressing  Goal: Patient-Specific Goal (Individualized)  Outcome: Ongoing, Progressing  Goal: Absence of Hospital-Acquired Illness or Injury  Outcome: Ongoing, Progressing  Goal: Optimal Comfort and Wellbeing  Outcome: Ongoing, Progressing  Goal: Readiness for Transition of Care  Outcome: Ongoing, Progressing     Problem: Impaired Wound Healing  Goal: Optimal Wound Healing  Outcome: Ongoing, Progressing     Problem: Pain Acute  Goal: Acceptable Pain Control and Functional Ability  Outcome: Ongoing, Progressing     Problem: Diabetes Comorbidity  Goal: Blood Glucose Level Within Targeted Range  Outcome: Ongoing, Progressing  Intervention: Monitor and Manage Glycemia  Flowsheets (Taken 10/3/2022 0837)  Glycemic Management:   blood glucose monitored   insulin dose matched to carbohydrate intake   supplemental insulin given

## 2022-10-03 NOTE — PROGRESS NOTES
[facility] Progress Note    Maribel Araiza   [id number]     Subjective  No acute events overnight, VSS, Tmax 98.6. Denies severe pain. Tearful this morning. Denies f/c, n/v, abdominal pain, chest pain.    Objective  Temp:  [97.7 °F (36.5 °C)-98.6 °F (37 °C)] 98 °F (36.7 °C)  Pulse:  [] 69  Resp:  [18-20] 18  SpO2:  [96 %-100 %] 96 %  BP: (131-151)/() 145/84    I/O this shift:  In: 150 [IV Piggyback:150]  Out: -   No intake/output data recorded.    Physical Exam  Gen: NAD, AAOx3  CV: extremities wwp, regular rate, palpable radials  Pulm: nonlabored, no increased wob, equal chest rise b/l   Abd: s/nt/nd  Wounds: Right 3rd toe still unable to express purulent fluid from wound with pressure. Wound slightly more dry than prior exam. Patient denies pain throughout exam. Additionally, right 4th toe swollen and erythematous without open wound/drainage    Labs:      Recent Labs   Lab 10/02/22  1318 10/02/22  1327 10/03/22  0356   WBC 8.0  --  5.7   HGB 15.8  --  12.0   HCT 47.4*  --  35.5*     --  264   NA  --  139 140   CHLORIDE  --  96* 108*   CO2  --  25 24   BUN  --  10.5 9.6*   CREATININE  --  0.72 0.55   GLUCOSE  --  234* 133*   CALCIUM  --  10.8* 8.7   MG  --   --  2.30   PHOS  --   --  3.6   ALKPHOS  --  119 83       Micro:   2x blood culture pending    Imaging:   CT Foot w/wo contrast 10/2  Findings concerning for early osteomyelitis of the 3rd proximal phalanx.  No rim enhancing fluid collection is appreciated.  Inflammatory soft tissues throughout the distal foot.    Right Foot X-ray 10/2  There is prominent hallux valgus deformity.  Degenerative osteoarthritic changes involve the tarsometatarsal articulations and there is a prominent calcaneal enthesophyte.  No osteolytic destructive changes identified to suggest osteomyelitis.  There are soft tissue inflammations with scattered small air locules.     Impression:  No acute osseous abnormality identified.    A/P:    Maribel Araiza is a 57  y.o. female with a PMH of gestational diabetes and recently diagnosed DM2 noted during this visit who presents with a right 3rd toe wound with surrounding cellulitis.    - Pending MRI for further planning, Xray does not show osteo, CT concern for early osteo  - Wound care consulted today  - Discussed conservative vs invasive therapy with patient today    Kevin Carnes MD  [facility], PGY-1

## 2022-10-03 NOTE — CONSULTS
"Inpatient Nutrition Evaluation    Admit Date: 10/2/2022   Total duration of encounter: 1 day    Nutrition Recommendation/Prescription     Diabetic diet -- education provided  Monitor Weights Weekly     Nutrition Assessment     Chart Review    Reason Seen: continuous nutrition monitoring and physician consult    Diagnosis:  RT foot cellulitis vs osteomyelitis, New onset Diabetes, High blood pressure    Relevant Medical History: gestational DM    Nutrition-Related Medications: NaCL @ 100 ml/hr, Insulin, Vanc    Nutrition-Related Labs:  10/2/22 -- Hgb A1C 10.8 H  10/3/22 -- Glu 133 H, Na 140, K 4.1, BUN 9.6, Cr 0.5    Diet Order: Diet diabetic  Oral Supplement Order: none at this time  Appetite/Oral Intake: good/% of meals  Factors Affecting Nutritional Intake: none identified at this time  Food/Mor[place]/Cultural Preferences: none reported    Wound(s):   RT foot ulcer/cellulitis vs osteomyelitis    Comments    10/3/22 -- Pt reports good appetite, denies difficulty eating; Hgb A1C (H) - new onset DM, diet education provided with RD number for contact; Pt denies any significant wt changes, reporting UBW ranging between 185-190 lb    Anthropometrics    Height: 5' 8" (172.7 cm) Height Method: Stated  Last Weight: 83.5 kg (184 lb) (10/02/22 1214) Weight Method: Stated  BMI (Calculated): 28  BMI Classification: overweight (BMI 25-29.9)     Ideal Body Weight (IBW), Female: 140 lb     % Ideal Body Weight, Female (lb): 131.43 %                    Usual Body Weight (UBW), k.2 kg  % Usual Body Weight: 98.16     Usual Weight Provided By: patient    Wt Readings from Last 5 Encounters:   10/02/22 83.5 kg (184 lb)   10/02/22 83.5 kg (184 lb)     Weight Change(s) Since Admission:  Admit Weight: 83.5 kg (184 lb) (10/02/22 1214)  No wt hx available, pt reports UBW range 185-190 lb, denies any significant wt changes    Patient Education    Education Provided: diabetic diet  Teaching Method: explanation and printed " materials  Comprehension: verbalizes understanding  Barriers to Learning: none evident  Expected Compliance: good  Comments: All questions were answered and dietitian's contact information was provided.     Monitoring & Evaluation     Dietitian will monitor food and beverage intake, weight change, food/nutrition knowledge skill, glucose/endocrine profile, and gastrointestinal profile.  Nutrition Risk/Follow-Up: low (follow-up in 5-7 days)  Patients assigned 'low nutrition risk' status do not qualify for a full nutritional assessment but will be monitored and re-evaluated in a 5-7 day time period.

## 2022-10-03 NOTE — CONSULTS
Patient is seen at bedside to eval and treat reported wound to right 3rd toe. Pt states that she has had wounds here in the past but never to this extent. Pt is a newly diagnosed diabetic. Photo is reviewed as fresh dressing in place at this time. Pt with obvious infections changes that are highly suspicion for osteo. MRI pending. Pt and I discussed all options including surgical options. While upset, pt seems to have a good understanding regarding the severity of her situation. Plans to follow up with surgery when MRI resulted. Pt with changes to right foot that are concerning. Pt may need ortho follow up. Wound care to follow closely.

## 2022-10-03 NOTE — PT/OT/SLP PROGRESS
Physical Therapy  Missed Treatment Note    Patient Name:  Maribel Araiza   MRN:  [id number]    Patient not seen today secondary to Testing/imaging (xray/CT/MRI), Other (Comment) (awaiting BLE US to r/o DVT.). Will follow-up as scheduled.

## 2022-10-03 NOTE — PLAN OF CARE
Problem: Adult Inpatient Plan of Care  Goal: Plan of Care Review  Outcome: Ongoing, Progressing  Goal: Patient-Specific Goal (Individualized)  Outcome: Ongoing, Progressing  Goal: Absence of Hospital-Acquired Illness or Injury  Outcome: Ongoing, Progressing  Goal: Optimal Comfort and Wellbeing  Outcome: Ongoing, Progressing  Goal: Readiness for Transition of Care  Outcome: Ongoing, Progressing     Problem: Impaired Wound Healing  Goal: Optimal Wound Healing  Outcome: Ongoing, Progressing     Problem: Pain Acute  Goal: Acceptable Pain Control and Functional Ability  Outcome: Ongoing, Progressing

## 2022-10-03 NOTE — PROGRESS NOTES
Pharmacokinetic Assessment Follow Up: IV Vancomycin    Vancomycin serum concentration assessment(s):    The random level was drawn correctly and can be used to guide therapy at this time. The measurement is below the desired definitive target range of 10 to 20 mcg/mL.    Vancomycin Regimen Plan:    Change regimen to Vancomycin 1500 mg IV every 12 hours with next serum trough concentration measured at 1500 prior to 1600 dose on 10/4/22    Drug levels (last 3 results):  Recent Labs   Lab Result Units 10/03/22  1500   Vanc Lvl Random ug/ml 7.7*       Pharmacy will continue to follow and monitor vancomycin.    Please contact pharmacy at extension [phone] for questions regarding this assessment.    Thank you for the consult,   Tatianna Avila       Patient brief summary:  Maribel Araiza is a 57 y.o. female initiated on antimicrobial therapy with IV Vancomycin for treatment of bone/joint infection    The patient's current regimen is 1500mg q12h    Drug Allergies:   Review of patient's allergies indicates:  No Known Allergies    Actual Body Weight:   83.5kg    Renal Function:   Estimated Creatinine Clearance: 129.3 mL/min (based on SCr of 0.55 mg/dL).,     CBC (last 72 hours):  Recent Labs   Lab Result Units 10/02/22  1318 10/03/22  0356   WBC x10(3)/mcL 8.0 5.7   Hgb gm/dL 15.8 12.0   Hemoglobin A1c % 10.8*  --    Hct % 47.4* 35.5*   Platelet x10(3)/mcL 276 264   Mono % % 8.5 9.8   Eos % % 2.1 3.5   Basophil % % 0.4 0.5       Metabolic Panel (last 72 hours):  Recent Labs   Lab Result Units 10/02/22  1327 10/03/22  0356   Sodium Level mmol/L 139 140   Potassium Level mmol/L 3.7 4.1   Chloride mmol/L 96* 108*   Carbon Dioxide mmol/L 25 24   Glucose Level mg/dL 234* 133*   Blood Urea Nitrogen mg/dL 10.5 9.6*   Creatinine mg/dL 0.72 0.55   Albumin Level gm/dL 3.6 2.5*   Bilirubin Total mg/dL 0.8 0.3   Alkaline Phosphatase unit/L 119 83   Aspartate Aminotransferase unit/L 15 15   Alanine Aminotransferase unit/L 13 10   Magnesium  Level mg/dL  --  2.30   Phosphorus Level mg/dL  --  3.6       Vancomycin Administrations:  vancomycin given in the last 96 hours                     vancomycin (VANCOCIN) 1,250 mg in sodium chloride 0.9% 250 mL IVPB (mg) 1,250 mg New Bag 10/03/22 0350    vancomycin (VANCOCIN) 1,500 mg in sodium chloride 0.9% 250 mL IVPB (mg) 1,500 mg New Bag 10/02/22 1532                    Microbiologic Results:  Microbiology Results (last 7 days)       Procedure Component Value Units Date/Time    Blood Culture #1 [[id number]] Collected: 10/02/22 1325    Order Status: Resulted Specimen: Blood Updated: 10/02/22 1326    Blood Culture #2 [[id number]] Collected: 10/02/22 1325    Order Status: Resulted Specimen: Blood Updated: 10/02/22 1326

## 2022-10-03 NOTE — MEDICAL/APP STUDENT
[facility] Progress Note    Maribel Araiza   [id number]     Subjective  Maribel Araiza is a 57 y.o. female presenting with a wound of the right 3rd toe with surrounding cellulitis and with general surgery consult on 10/2 for concerns of underlying osteomyelitis. Nurse reports NAOE, patient reports no discomfort or pain on there right third toe. Patient is able to ambulate and is on a normal diet. Patient reports no n/f/v/CP/SOB    Objective  Temp:  [97.7 °F (36.5 °C)-98.6 °F (37 °C)] 98 °F (36.7 °C)  Pulse:  [] 69  Resp:  [18-20] 18  SpO2:  [96 %-100 %] 96 %  BP: (131-151)/() 145/84    I/O this shift:  In: 150 [IV Piggyback:150]  Out: -   No intake/output data recorded.    Physical Exam  Gen: NAD, AAOx3  CV: extremities wwp, regular rate, 2+ palpable radials, normal S1 and S2  Pulm: nonlabored, no increased wob, equal chest rise b/l, CTAB  Abd: s/nt/nd   Extremity: Palpable 2+ PT/DP bilaterally, dressing removed on right third toe, erythema in the anterior right leg and right foot has decreased from prior day. Third right toe produced purulent discharge on gauze. Right third toe with white wound on dorsal surface with skin breakage and drainage. Right fourth toe is swollen without wound/drainage    Labs:      Recent Labs   Lab 10/02/22  1318 10/02/22  1327 10/03/22  0356   WBC 8.0  --  5.7   HGB 15.8  --  12.0   HCT 47.4*  --  35.5*     --  264   NA  --  139 140   CHLORIDE  --  96* 108*   CO2  --  25 24   BUN  --  10.5 9.6*   CREATININE  --  0.72 0.55   GLUCOSE  --  234* 133*   CALCIUM  --  10.8* 8.7   MG  --   --  2.30   PHOS  --   --  3.6   ALKPHOS  --  119 83       Imaging Results              CT Foot W W/O Contrast Right (Final result)  Result time 10/02/22 17:54:27      Final result by Amanuel Ruiz MD (10/02/22 17:54:27)                   Impression:      Findings concerning for early osteomyelitis of the 3rd proximal phalanx.  No rim enhancing fluid collection is appreciated.   Inflammatory soft tissues throughout the distal foot.      Electronically signed by: Amanuel Ruiz  Date:    10/02/2022  Time:    17:54               Narrative:    CLINICAL HISTORY:  Trauma.    TECHNIQUE:  Right foot was performed without  contrast. There are sagittal and coronal reconstructed images available for review.    Automatic exposure control was utilized to reduce the patient's radiation dose.    DLP = 946    COMPARISON:  No prior imaging available for comparison.    FINDINGS:  Soft tissue edema with subcutaneous gas is noted within the soft tissues of the 3rd digit.  Findings concerning for infectious process.  No rim enhancing fluid is appreciated.  The proximal phalanx is sclerotic distally.  Early osteomyelitis is not excluded.  Chronic degenerative changes with subchondral cystic changes throughout the midfoot.                                       X-Ray Foot Complete Right (Final result)  Result time 10/02/22 13:51:22      Final result by Kirill Gorman MD (10/02/22 13:51:22)                   Impression:      No acute osseous abnormality identified.      Electronically signed by: Kirill Gorman  Date:    10/02/2022  Time:    13:51               Narrative:    EXAMINATION:  XR FOOT COMPLETE 3 VIEW RIGHT    CLINICAL HISTORY:  Cellulitis, unspecified    TECHNIQUE:  Three views    COMPARISON:  None available    FINDINGS:  There is prominent hallux valgus deformity.  Degenerative osteoarthritic changes involve the tarsometatarsal articulations and there is a prominent calcaneal enthesophyte.  No osteolytic destructive changes identified to suggest osteomyelitis.  There are soft tissue inflammations with scattered small air locules.                                    MICRO:  Microbiology Results (last 7 days)       Procedure Component Value Units Date/Time    Blood Culture #1 [[id number]] Collected: 10/02/22 1325    Order Status: Resulted Specimen: Blood Updated: 10/02/22 1326    Blood Culture #2  [[id number]] Collected: 10/02/22 1325    Order Status: Resulted Specimen: Blood Updated: 10/02/22 1326             A/P:    Maribel Araiza is a 57 y.o. female presenting with a wound of the right 3rd toe with surrounding cellulitis and with general surgery consult on 10/2 for concerns of underlying osteomyelitis.     -Pending Micro blood cultures   -will receive MRI and US on right foot on 10/3  -follow up with wound care     Polo Thomas  [employer] MS[facility]

## 2022-10-04 LAB
ALBUMIN SERPL-MCNC: 2.5 GM/DL (ref 3.5–5)
ALBUMIN/GLOB SERPL: 0.9 RATIO (ref 1.1–2)
ALP SERPL-CCNC: 76 UNIT/L (ref 40–150)
ALT SERPL-CCNC: 15 UNIT/L (ref 0–55)
AST SERPL-CCNC: 12 UNIT/L (ref 5–34)
BASOPHILS # BLD AUTO: 0.03 X10(3)/MCL (ref 0–0.2)
BASOPHILS NFR BLD AUTO: 0.5 %
BILIRUBIN DIRECT+TOT PNL SERPL-MCNC: 0.3 MG/DL
BUN SERPL-MCNC: 8 MG/DL (ref 9.8–20.1)
CALCIUM SERPL-MCNC: 8.5 MG/DL (ref 8.4–10.2)
CHLORIDE SERPL-SCNC: 110 MMOL/L (ref 98–107)
CO2 SERPL-SCNC: 24 MMOL/L (ref 22–29)
CREAT SERPL-MCNC: 0.52 MG/DL (ref 0.55–1.02)
EOSINOPHIL # BLD AUTO: 0.19 X10(3)/MCL (ref 0–0.9)
EOSINOPHIL NFR BLD AUTO: 3.3 %
ERYTHROCYTE [DISTWIDTH] IN BLOOD BY AUTOMATED COUNT: 11.4 % (ref 11.5–17)
GFR SERPLBLD CREATININE-BSD FMLA CKD-EPI: >60 MLS/MIN/1.73/M2
GLOBULIN SER-MCNC: 2.9 GM/DL (ref 2.4–3.5)
GLUCOSE SERPL-MCNC: 103 MG/DL (ref 74–100)
HCT VFR BLD AUTO: 35 % (ref 37–47)
HGB BLD-MCNC: 11.9 GM/DL (ref 12–16)
IMM GRANULOCYTES # BLD AUTO: 0.01 X10(3)/MCL (ref 0–0.04)
IMM GRANULOCYTES NFR BLD AUTO: 0.2 %
LYMPHOCYTES # BLD AUTO: 2.17 X10(3)/MCL (ref 0.6–4.6)
LYMPHOCYTES NFR BLD AUTO: 37.4 %
MAGNESIUM SERPL-MCNC: 2.2 MG/DL (ref 1.6–2.6)
MCH RBC QN AUTO: 29.9 PG (ref 27–31)
MCHC RBC AUTO-ENTMCNC: 34 MG/DL (ref 33–36)
MCV RBC AUTO: 87.9 FL (ref 80–94)
MONOCYTES # BLD AUTO: 0.56 X10(3)/MCL (ref 0.1–1.3)
MONOCYTES NFR BLD AUTO: 9.7 %
NEUTROPHILS # BLD AUTO: 2.8 X10(3)/MCL (ref 2.1–9.2)
NEUTROPHILS NFR BLD AUTO: 48.9 %
NRBC BLD AUTO-RTO: 0 %
PHOSPHATE SERPL-MCNC: 3.9 MG/DL (ref 2.3–4.7)
PLATELET # BLD AUTO: 288 X10(3)/MCL (ref 130–400)
PMV BLD AUTO: 10.2 FL (ref 7.4–10.4)
POCT GLUCOSE: 127 MG/DL (ref 70–110)
POCT GLUCOSE: 155 MG/DL (ref 70–110)
POCT GLUCOSE: 168 MG/DL (ref 70–110)
POCT GLUCOSE: 200 MG/DL (ref 70–110)
POCT GLUCOSE: 201 MG/DL (ref 70–110)
POTASSIUM SERPL-SCNC: 3.8 MMOL/L (ref 3.5–5.1)
PROT SERPL-MCNC: 5.4 GM/DL (ref 6.4–8.3)
RBC # BLD AUTO: 3.98 X10(6)/MCL (ref 4.2–5.4)
SODIUM SERPL-SCNC: 143 MMOL/L (ref 136–145)
VANCOMYCIN TROUGH SERPL-MCNC: 10.4 UG/ML (ref 15–20)
WBC # SPEC AUTO: 5.8 X10(3)/MCL (ref 4.5–11.5)

## 2022-10-04 PROCEDURE — 97165 OT EVAL LOW COMPLEX 30 MIN: CPT

## 2022-10-04 PROCEDURE — 25000003 PHARM REV CODE 250

## 2022-10-04 PROCEDURE — 83735 ASSAY OF MAGNESIUM: CPT

## 2022-10-04 PROCEDURE — 96366 THER/PROPH/DIAG IV INF ADDON: CPT

## 2022-10-04 PROCEDURE — 36415 COLL VENOUS BLD VENIPUNCTURE: CPT | Performed by: INTERNAL MEDICINE

## 2022-10-04 PROCEDURE — 85025 COMPLETE CBC W/AUTO DIFF WBC: CPT

## 2022-10-04 PROCEDURE — 96372 THER/PROPH/DIAG INJ SC/IM: CPT

## 2022-10-04 PROCEDURE — 96361 HYDRATE IV INFUSION ADD-ON: CPT

## 2022-10-04 PROCEDURE — 84100 ASSAY OF PHOSPHORUS: CPT

## 2022-10-04 PROCEDURE — 11000001 HC ACUTE MED/SURG PRIVATE ROOM

## 2022-10-04 PROCEDURE — 36415 COLL VENOUS BLD VENIPUNCTURE: CPT

## 2022-10-04 PROCEDURE — 63600175 PHARM REV CODE 636 W HCPCS

## 2022-10-04 PROCEDURE — 94761 N-INVAS EAR/PLS OXIMETRY MLT: CPT

## 2022-10-04 PROCEDURE — 93005 ELECTROCARDIOGRAM TRACING: CPT

## 2022-10-04 PROCEDURE — S0030 INJECTION, METRONIDAZOLE: HCPCS

## 2022-10-04 PROCEDURE — 80202 ASSAY OF VANCOMYCIN: CPT | Performed by: INTERNAL MEDICINE

## 2022-10-04 PROCEDURE — 80053 COMPREHEN METABOLIC PANEL: CPT

## 2022-10-04 PROCEDURE — 25000003 PHARM REV CODE 250: Performed by: NURSE PRACTITIONER

## 2022-10-04 PROCEDURE — 97161 PT EVAL LOW COMPLEX 20 MIN: CPT

## 2022-10-04 RX ORDER — POTASSIUM CHLORIDE 20 MEQ/1
20 TABLET, EXTENDED RELEASE ORAL ONCE
Status: COMPLETED | OUTPATIENT
Start: 2022-10-04 | End: 2022-10-04

## 2022-10-04 RX ADMIN — METRONIDAZOLE 500 MG: 5 INJECTION, SOLUTION INTRAVENOUS at 08:10

## 2022-10-04 RX ADMIN — CEFEPIME 2 G: 2 INJECTION, POWDER, FOR SOLUTION INTRAVENOUS at 06:10

## 2022-10-04 RX ADMIN — CEFEPIME 2 G: 2 INJECTION, POWDER, FOR SOLUTION INTRAVENOUS at 01:10

## 2022-10-04 RX ADMIN — INSULIN DETEMIR 10 UNITS: 100 INJECTION, SOLUTION SUBCUTANEOUS at 09:10

## 2022-10-04 RX ADMIN — VANCOMYCIN HYDROCHLORIDE 1500 MG: 1 INJECTION, POWDER, LYOPHILIZED, FOR SOLUTION INTRAVENOUS at 03:10

## 2022-10-04 RX ADMIN — INSULIN ASPART 2 UNITS: 100 INJECTION, SOLUTION INTRAVENOUS; SUBCUTANEOUS at 04:10

## 2022-10-04 RX ADMIN — LOSARTAN POTASSIUM 25 MG: 25 TABLET, FILM COATED ORAL at 08:10

## 2022-10-04 RX ADMIN — INSULIN ASPART 5 UNITS: 100 INJECTION, SOLUTION INTRAVENOUS; SUBCUTANEOUS at 04:10

## 2022-10-04 RX ADMIN — METRONIDAZOLE 500 MG: 5 INJECTION, SOLUTION INTRAVENOUS at 04:10

## 2022-10-04 RX ADMIN — INSULIN ASPART 2 UNITS: 100 INJECTION, SOLUTION INTRAVENOUS; SUBCUTANEOUS at 11:10

## 2022-10-04 RX ADMIN — INSULIN ASPART 5 UNITS: 100 INJECTION, SOLUTION INTRAVENOUS; SUBCUTANEOUS at 11:10

## 2022-10-04 RX ADMIN — INSULIN ASPART 5 UNITS: 100 INJECTION, SOLUTION INTRAVENOUS; SUBCUTANEOUS at 08:10

## 2022-10-04 RX ADMIN — INSULIN ASPART 2 UNITS: 100 INJECTION, SOLUTION INTRAVENOUS; SUBCUTANEOUS at 09:10

## 2022-10-04 RX ADMIN — CEFEPIME 2 G: 2 INJECTION, POWDER, FOR SOLUTION INTRAVENOUS at 09:10

## 2022-10-04 RX ADMIN — SODIUM CHLORIDE: 9 INJECTION, SOLUTION INTRAVENOUS at 06:10

## 2022-10-04 RX ADMIN — VANCOMYCIN HYDROCHLORIDE 1500 MG: 1 INJECTION, POWDER, LYOPHILIZED, FOR SOLUTION INTRAVENOUS at 06:10

## 2022-10-04 RX ADMIN — ENOXAPARIN SODIUM 40 MG: 40 INJECTION SUBCUTANEOUS at 04:10

## 2022-10-04 RX ADMIN — POTASSIUM CHLORIDE 20 MEQ: 1500 TABLET, EXTENDED RELEASE ORAL at 06:10

## 2022-10-04 NOTE — PROGRESS NOTES
Patient is seen at bedside to follow up on right 3rd toe wound. Pt scheduled for amputation tomorrow. Dressing as removed this morning by surgery and replaced by nursing so not removed at this time. Pt was working with PT so a darco shoe was given today so pt can begin to practice with it. I will not known final weightbearing reccs till post op. Pt denies any questions at this time. Wound care to continue to follow with surgery team.

## 2022-10-04 NOTE — PLAN OF CARE
Problem: Adult Inpatient Plan of Care  Goal: Plan of Care Review  Outcome: Ongoing, Progressing  Goal: Patient-Specific Goal (Individualized)  Outcome: Ongoing, Progressing  Goal: Absence of Hospital-Acquired Illness or Injury  Outcome: Ongoing, Progressing  Goal: Optimal Comfort and Wellbeing  Outcome: Ongoing, Progressing  Goal: Readiness for Transition of Care  Outcome: Ongoing, Progressing     Problem: Impaired Wound Healing  Goal: Optimal Wound Healing  Outcome: Ongoing, Progressing     Problem: Pain Acute  Goal: Acceptable Pain Control and Functional Ability  Outcome: Ongoing, Progressing     Problem: Diabetes Comorbidity  Goal: Blood Glucose Level Within Targeted Range  Outcome: Ongoing, Progressing

## 2022-10-04 NOTE — PROGRESS NOTES
[facility] Medicine Wards   Progress Note     Resident Team: [facility] Medicine List 1  Attending Physician: Deo Brunner MD  Resident: Arden Grajeda MD  Intern: Dayton Coleman Anton   [facility] Length of Stay: 0 days    Subjective:      Brief HPI:  Maribel Araiza is a 57 y.o. female with a history of gestational diabetes who presented to [facility] ED on 10/2/2022  with complaint of right foot 3rd toe wound. Patient reports that she first noticed the toe wound yesterday night. She did not notice the wound the night prior but does not having a foot sore in the same area a couple weeks ago. She normally has foot sores in that area due to her arched feet that scrapes against her shoes. She says the sores come and go but have never gotten this bad. She denies any pain in her toes but does complain of pain in her right calf. She says that she also get some swelling in her feet when she goes for a long drive. Of note, patient recently returned from [place] for her father's  which was a 15 hr drive this past . Patient was in emotional distress during the interview due to other family issues as well. Patient reports no past medical history except gestational diabetes during her pregnancies. She has not seen a physician in years. She says she does not smoke, only drinks alcohol socially, and denies any other drug use. She denies having any fevers, chills, SOB, chest pain, abdominal pain, dysuria, constipation or diarrhea.     Interval History:   Patient had no acute events overnight.  Denies any pain, shortness a breath, chest pain, dizziness, palpitations, nausea, vomiting, diarrhea.  Patient to be NPO tonight for amputation of the right digit in a.m. continues to require IV antibiotics.  NIVA scan negative for DVT and ABIs within normal limits.    Review of Systems   All other systems reviewed and are negative.        Objective:     Vital Signs (Most Recent):  Temp: 98 °F (36.7 °C) (10/04/22 0307)  Pulse: 77 (10/04/22  0307)  Resp: 18 (10/04/22 0307)  BP: 128/74 (10/04/22 0307)  SpO2: 99 % (10/04/22 0307)   Vital Signs (24h Range):  Temp:  [97.6 °F (36.4 °C)-98.4 °F (36.9 °C)] 98 °F (36.7 °C)  Pulse:  [69-84] 77  Resp:  [18-20] 18  SpO2:  [95 %-99 %] 99 %  BP: (112-156)/(68-93) 128/74     Physical Exam  Vitals reviewed.   Constitutional:       Appearance: Normal appearance.   HENT:      Head: Normocephalic and atraumatic.   Eyes:      Extraocular Movements: Extraocular movements intact.      Conjunctiva/sclera: Conjunctivae normal.      Pupils: Pupils are equal, round, and reactive to light.   Cardiovascular:      Rate and Rhythm: Normal rate and regular rhythm.      Pulses: Normal pulses.      Heart sounds: No murmur heard.  Pulmonary:      Effort: Pulmonary effort is normal. No respiratory distress.      Breath sounds: Normal breath sounds.   Chest:      Chest wall: No tenderness.   Abdominal:      General: Abdomen is flat. Bowel sounds are normal. There is no distension.      Palpations: Abdomen is soft.      Tenderness: There is no abdominal tenderness.   Musculoskeletal:         General: Normal range of motion.      Cervical back: Normal range of motion.   Skin:     General: Skin is warm.      Capillary Refill: Capillary refill takes less than 2 seconds.      Comments: Right lower extremity wrapped clean and dry; some right lower swelling present up to mid calf   Neurological:      General: No focal deficit present.      Mental Status: She is alert and oriented to person, place, and time.     Laboratory:  Most Recent Data:  CBC:   Recent Labs   Lab 10/02/22  1318 10/03/22  0356 10/04/22  0345   WBC 8.0 5.7 5.8   HGB 15.8 12.0 11.9*   HCT 47.4* 35.5* 35.0*    264 288       CMP:   Recent Labs   Lab 10/03/22  0356   CALCIUM 8.7   ALBUMIN 2.5*      K 4.1   CO2 24   BUN 9.6*   CREATININE 0.55   ALKPHOS 83   ALT 10   AST 15   BILITOT 0.3       All pertinent lab results from the last 24 hours have been reviewed.       Microbiology Data Reviewed: yes  Pertinent Findings:  Blood cultures pending     Radiology:  Imaging Results              MRI Foot (Forefoot) Right W W/O Contrast (Final result)  Result time 10/03/22 09:38:27      Final result by Sly Davis MD (10/03/22 09:38:27)                   Impression:      Findings consistent with osteomyelitis of the proximal, middle, and distal phalanges of the 3rd digit.  Severe regional soft tissue edema noted.    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    Severe generalized soft tissue edema      Electronically signed by: Frank Davis MD  Date:    10/03/2022  Time:    09:38               Narrative:    EXAMINATION:  MRI FOOT (FOREFOOT) RIGHT W W/O CONTRAST    CLINICAL HISTORY:  Foot swelling, diabetic, osteomyelitis suspected, xray done;    TECHNIQUE:  MRI right forefoot performed before and after intravenous contrast using routine protocol.  18 cc contrast administered.    COMPARISON:  Comparison CT 10/02/2022    FINDINGS:  Intense osseous edema involving the proximal and middle phalanges of the 3rd digit with lesser intensity edema distal phalanx consistent with osteomyelitis.  Generalized severe soft tissue edema noted.  No obvious abscess.  Heterogeneous edema, advanced degenerative changes noted involving the tarsometatarsal joints including periarticular edema, fiber cystic change, osteophytic change, extensive erosive change consistent with neuropathic joint.  Severe joint soft tissue edema.  Flexor and extensor tendons grossly intact.                                       CT Foot W W/O Contrast Right (Final result)  Result time 10/02/22 17:54:27      Final result by Amanuel Ruiz MD (10/02/22 17:54:27)                   Impression:      Findings concerning for early osteomyelitis of the 3rd proximal phalanx.  No rim enhancing fluid collection is appreciated.  Inflammatory soft tissues throughout the distal foot.      Electronically  signed by: Amanuel Ruiz  Date:    10/02/2022  Time:    17:54               Narrative:    CLINICAL HISTORY:  Trauma.    TECHNIQUE:  Right foot was performed without  contrast. There are sagittal and coronal reconstructed images available for review.    Automatic exposure control was utilized to reduce the patient's radiation dose.    DLP = 946    COMPARISON:  No prior imaging available for comparison.    FINDINGS:  Soft tissue edema with subcutaneous gas is noted within the soft tissues of the 3rd digit.  Findings concerning for infectious process.  No rim enhancing fluid is appreciated.  The proximal phalanx is sclerotic distally.  Early osteomyelitis is not excluded.  Chronic degenerative changes with subchondral cystic changes throughout the midfoot.                                       X-Ray Foot Complete Right (Final result)  Result time 10/02/22 13:51:22      Final result by Kirill Gorman MD (10/02/22 13:51:22)                   Impression:      No acute osseous abnormality identified.      Electronically signed by: Kirill Gorman  Date:    10/02/2022  Time:    13:51               Narrative:    EXAMINATION:  XR FOOT COMPLETE 3 VIEW RIGHT    CLINICAL HISTORY:  Cellulitis, unspecified    TECHNIQUE:  Three views    COMPARISON:  None available    FINDINGS:  There is prominent hallux valgus deformity.  Degenerative osteoarthritic changes involve the tarsometatarsal articulations and there is a prominent calcaneal enthesophyte.  No osteolytic destructive changes identified to suggest osteomyelitis.  There are soft tissue inflammations with scattered small air locules.                                    Current Medications:     Infusions:   sodium chloride 0.9% 100 mL/hr at 10/03/22 9425        Scheduled:   ceFEPime (MAXIPIME) IVPB  2 g Intravenous Q8H    enoxaparin  40 mg Subcutaneous Daily    insulin aspart U-100  5 Units Subcutaneous TIDWM    insulin detemir U-100  10 Units Subcutaneous QHS    losartan  25  mg Oral Daily    metronidazole  500 mg Intravenous Q8H    vancomycin (VANCOCIN) IVPB  1,500 mg Intravenous Q12H        PRN:  acetaminophen, dextrose 10%, dextrose 10%, glucagon (human recombinant), glucose, glucose, hydrALAZINE, HYDROmorphone, insulin aspart U-100, labetalol, melatonin, oxyCODONE, oxyCODONE, sodium chloride 0.9%, Pharmacy to dose Vancomycin consult **AND** vancomycin - pharmacy to dose    Antibiotics and Day Number of Therapy:  Vancomycin, Cefepime, and Flagyl Day 2    Intake/Output Summary (Last 24 hours) at 10/4/2022 0444  Last data filed at 10/4/2022 0054  Gross per 24 hour   Intake 2090 ml   Output --   Net 2090 ml       Lines/Drains/Airways       Peripheral Intravenous Line  Duration                  Peripheral IV - Single Lumen 10/03/22 1812 20 G Anterior;Left Forearm <1 day                  Assessment & Plan:     Right foot cellulitis vs osteomyelitis  - Patient reports acute worsening of right foot sore yesterday  - SIRS 2/4: Afebrile, tachycardic 108, tachypneic 20, WBC 8 on admission  - Elevated inflammatory markers ESR 70 and   - A1c elevated 10.8  - Blood cultures currently pending  - Right foot x-ray showed no osseus abnormalities  - Given dose of Cefepime and Vanc in the ED  - Ordered b/l venous US LE to assess for DVTs  - CT right foot showed concern for osteomyelitis of 3rd proximal phalanx  - MRI right foot confirmed osteomyelitis  - Continue on Vanc, Cefepime, and Flagyl; currently day 3  - Continue  cc/hr  - Surgery to perform right foot 3rd digit amputation on 10/05/2022.  Patient to be NPO after midnight   -No evidence of DVT in the visualized veins of the bilateral lower extremities.  Edema visualized in the right calf.  -RIN normal   -CMP, CBC, Mg, Phos in am . Keep K>4, Phos>3, Mg>2 and will replace accordingly      New onset diabetes  - Reports history of gestational diabetes  - Does not take medications at home and does not check sugars at home  - A1c in the  ED was 10.8  - Fasting a.m glucose 103 at goal (Goal 140-180)  - Continue Detemir 10 units nightly and Aspart 5 units with meals  - Continue LEANN     High blood pressure  - BP elevated 151/103 in the ED  - Patient was anxious due to recently family circumstances  - Continue PRN labetalol and hydralazine  - Will continue to monitor    CODE STATUS: Full Code  Access: Peripheral  Antibiotics: Vancomycin, Cefepime, Flagyl (Day 3)  Diet: Diabetic  DVT Prophylaxis: Lovenox  GI Prophylaxis: none needed  Fluids: normal saline 100 ml/hr.     Disposition: MRI confirmed osteomyelitis.  Continue IV antibiotics.  Patient NPO after midnight tonight for amputation of right foot, 3rd digit in a.m. per surgery    Sherly Lawrence MD  [facility], -1

## 2022-10-04 NOTE — PROGRESS NOTES
"[facility] Progress Note    Maribel Araiza   [id number]     Subjective  No acute events overnight, VSS, Tmax 98.4. Accepting of amputation and wants to "get it over with". Denies n/v, f/c, new pains.    Objective  Temp:  [97.6 °F (36.4 °C)-98.4 °F (36.9 °C)] 98 °F (36.7 °C)  Pulse:  [69-84] 77  Resp:  [18-20] 18  SpO2:  [95 %-99 %] 99 %  BP: (112-156)/(68-93) 128/74    I/O this shift:  In: 1350 [I.V.:900; IV Piggyback:450]  Out: -   I/O last 3 completed shifts:  In: 2090 [P.O.:790; I.V.:900; IV Piggyback:400]  Out: -     Physical Exam  Gen: NAD, AAOx3  CV: extremities wwp, regular rate, palpable radials  Pulm: nonlabored, no increased wob, equal chest rise b/l   Abd: s/nt/nd   Wounds: Right 3rd toe wound stable in appearance, more dry than prior exams.     Labs:      Recent Labs   Lab 10/03/22  0356 10/04/22  0345   WBC 5.7 5.8   HGB 12.0 11.9*   HCT 35.5* 35.0*    288    143   CHLORIDE 108* 110*   CO2 24 24   BUN 9.6* 8.0*   CREATININE 0.55 0.52*   GLUCOSE 133* 103*   CALCIUM 8.7 8.5   MG 2.30 2.20   PHOS 3.6 3.9   ALKPHOS 83 76     Imaging:   MRI Right Forefoot 10/3   - Findings consistent with osteomyelitis of the proximal, middle, and distal phalanges of the 3rd digit.  Severe regional soft tissue edema noted.   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    A/P:    Maribel Araiza is a 57 y.o. female with a PMH of gestational diabetes and recently diagnosed DM2 noted during this visit who presents with a right 3rd toe wound with surrounding cellulitis.    - Right 3rd toe amputation scheduled tomorrow 10/5  - NPO at midnight order in place  - Wound care onboard  - PT consulted 10/3 by primary  - Abx per primary    Kevin Carnes MD  [employer] General Surgery, PGY-1   "

## 2022-10-04 NOTE — PT/OT/SLP EVAL
Occupational Therapy   Evaluation/Eval Only    Name: Maribel Araiza  MRN: [id number]  Admitting Diagnosis:  R great toe cellulitis and osteomyelitis, scheduled for amputation tomorrow 10/5/22    Recent Surgery: Procedure(s) (LRB):  AMPUTATION, Left third toe (Left)      Recommendations:     Discharge Recommendations: home  Discharge Equipment Recommendations:  none  Barriers to discharge:       Assessment:     Maribel Araiza is a 57 y.o. female with a medical diagnosis as noted above.  She presents with return to baseline, I/mod I level with all ADLs and mobility. Performance deficits affecting function:  .      Rehab Prognosis:  OT services not warranted at this time  Plan:     Patient to be seen   to address the above listed problems via    Plan of Care Expires:    Plan of Care Reviewed with: patient, spouse    Subjective     Chief Complaint: no complaints  Patient/Family Comments/goals: return home at mod I level and resume all activities    Occupational Profile:  Living Environment: [facility] with  who is at [facility] d/t CVA one week ago and their 30 y.o. 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  Patient found ambulatory in room/driscoll with peripheral IV upon OT entry to room.    General Precautions: Standard,     Orthopedic Precautions:    Braces:    Respiratory Status: Room air    Occupational Performance:    Bed Mobility:    I with all bed mobility    Functional Mobility/Transfers:    Functional Mobility: I including managing IV pole during  ambulation    Activities of Daily Living:  I with all ADLs    Cognitive/Visual Perceptual:  Cognitive/Psychosocial Skills:     -       Oriented to: Person, Place, Time, and Situation   -       Follows Commands/attention:Follows multistep  commands  -       Safety awareness/insight to disability: intact     Physical Exam:  Upper Extremity Range of Motion:     -       Right Upper Extremity: WNL  -       Left Upper Extremity: WNL  Upper Extremity Strength:    -       Right Upper Extremity: WNL  -       Left Upper Extremity: WNL    AMPAC 6 Click ADL:  AMPAC Total Score:      Treatment & Education:  Pt educated no OT services warranted at this time.    Patient left ambulatory in room/driscoll with all lines intact    GOALS:   Multidisciplinary Problems       Occupational Therapy Goals       Not on file                    History:     No past medical history on file.    No past surgical history on file.    Time Tracking:     OT Date of Treatment: 10/04/22  OT Start Time: 0915  OT Stop Time: 0930  OT Total Time (min): 15 min    Billable Minutes:Evaluation 15    10/4/2022

## 2022-10-04 NOTE — PT/OT/SLP EVAL
Physical Therapy Evaluation and Discharge Note    Patient Name:  Maribel Araiza   MRN:  [id number]    Recommendations:     Discharge Recommendations:  home   Discharge Equipment Recommendations: none   Barriers to discharge: None    Assessment:     Maribel Araiza is a 57 y.o. female admitted with a medical diagnosis of   1. Diabetic infection of right foot    2. Cellulitis    3. Diabetes mellitus, new onset    4. DVT (deep venous thrombosis)    5. Osteomyelitis    6. Osteomyelitis of toe        At this time, patient is functioning at their prior level of function and does not require further acute PT services.     Recent Surgery: Procedure(s) (LRB):  AMPUTATION, Left third toe (Left)      Plan:     During this hospitalization, patient does not require further acute PT services.  Please re-consult if situation changes.      Subjective     Chief Complaint: None.  Patient/Family Comments/goals: Go home.  Pain/Comfort:  Pain Rating 1: 0/10  Pain Rating Post-Intervention 1: 0/10    Patients cultural, spiritual, Muslim conflicts given the current situation: no    Living Environment:  Pt. Lives with  and son (has a brain injury) in a one story home with no steps to enter.  Prior to admission, patients level of function was independent with ADLs and ambulation with no AD.  Equipment used at home: none.  DME owned (not currently used): none.  Upon discharge, patient will have assistance from no one.    Objective:     Communicated with nurse prior to session.  Patient found  standing in 's hospital room  with peripheral IV upon PT entry to room.    General Precautions: Standard,     Orthopedic Precautions:N/A   Braces: N/A   Respiratory Status: Room air    Exams:  Cognitive Exam:  Patient is oriented to Person, Place, Time, and Situation  RUE ROM: WFL  RUE Strength: WFL  LUE ROM: WFL  LUE Strength: WFL  RLE ROM: WFL  RLE Strength: WFL  LLE ROM: WFL  LLE Strength: WFL    Functional Mobility:  Gait: x130 feet  pushing IV pole with independence.  Pt. Ambulated another 30 feet with DARCO shoe to R foot with independence and IV pole.    Therapeutic Activities and Exercises:   Pt. Scheduled for R toe amputation on 10/5/2022.      Patient left  standing in 's hospital room  with all lines intact and nurse notified.    GOALS:   Multidisciplinary Problems       Physical Therapy Goals       Not on file                    History:     No past medical history on file.    No past surgical history on file.    Time Tracking:     PT Received On: 10/04/22  PT Start Time: 0917     PT Stop Time: 0935  PT Total Time (min): 18 min     Billable Minutes: Evaluation 18 minutes      10/04/2022

## 2022-10-04 NOTE — MEDICAL/APP STUDENT
[facility] Progress Note    Maribel Araiza   [id number]     Subjective  Maribel Araiza is a 57 y.o. female presenting with a wound of the right 3rd toe with surrounding cellulitis and confirmed osteomyelitis of 3rd right toe by MRI. Nurse reports NAOE, patient reports no discomfort or pain on there right third toe. Patient is able to ambulate and is on a normal diet. Patient reports no n/f/v/CP/SOB/Chills/Night sweats    Objective  Temp:  [97.6 °F (36.4 °C)-98.4 °F (36.9 °C)] 98 °F (36.7 °C)  Pulse:  [69-84] 77  Resp:  [18-20] 18  SpO2:  [95 %-99 %] 99 %  BP: (112-156)/(68-93) 128/74    I/O this shift:  In: 1350 [I.V.:900; IV Piggyback:450]  Out: -   I/O last 3 completed shifts:  In: 2090 [P.O.:790; I.V.:900; IV Piggyback:400]  Out: -     Physical Exam  Gen: NAD, AAOx3  CV: extremities wwp, regular rate, 2+ palpable radials, Normal S1 and S2  Pulm: nonlabored, no increased wob, equal chest rise b/l, CTAB  Abd: s/nt/nd  Wounds:Right 3rd toe with white wound on dorsal skin with diffuse cellulitis and minimal purulent discharge. Generalized swelling of the right feet with decreased erythema than previous day on 10/3    Labs:      Recent Labs   Lab 10/03/22  0356 10/04/22  0345   WBC 5.7 5.8   HGB 12.0 11.9*   HCT 35.5* 35.0*    288    143   CHLORIDE 108* 110*   CO2 24 24   BUN 9.6* 8.0*   CREATININE 0.55 0.52*   GLUCOSE 133* 103*   CALCIUM 8.7 8.5   MG 2.30 2.20   PHOS 3.6 3.9   ALKPHOS 83 76           Imaging:   10/3/2022  EXAMINATION:  MRI FOOT (FOREFOOT) RIGHT W W/O CONTRAST     CLINICAL HISTORY:  Foot swelling, diabetic, osteomyelitis suspected, xray done;     TECHNIQUE:  MRI right forefoot performed before and after intravenous contrast using routine protocol.  18 cc contrast administered.     COMPARISON:  Comparison CT 10/02/2022     FINDINGS:  Intense osseous edema involving the proximal and middle phalanges of the 3rd digit with lesser intensity edema distal phalanx consistent with  osteomyelitis.  Generalized severe soft tissue edema noted.  No obvious abscess.  Heterogeneous edema, advanced degenerative changes noted involving the tarsometatarsal joints including periarticular edema, fiber cystic change, osteophytic change, extensive erosive change consistent with neuropathic joint.  Severe joint soft tissue edema.  Flexor and extensor tendons grossly intact.     Impression:  Findings consistent with osteomyelitis of the proximal, middle, and distal phalanges of the 3rd digit.  Severe regional soft tissue edema noted.     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     Severe generalized soft tissue edema        A/P:    Maribel Araiza is a 57 y.o. female presenting with a wound of the right 3rd toe with surrounding cellulitis and confirmed osteomyelitis of 3rd right toe by MRI.    -continue diet and prn pain medication  -continue medicine plan of DM control  -Trying to schedule for toe amputation for 10/5/2022    Polo Thomas  [facility]

## 2022-10-05 ENCOUNTER — ANESTHESIA (OUTPATIENT)
Dept: SURGERY | Facility: HOSPITAL | Age: 57
DRG: 617 | End: 2022-10-05
Payer: COMMERCIAL

## 2022-10-05 ENCOUNTER — ANESTHESIA EVENT (OUTPATIENT)
Dept: SURGERY | Facility: HOSPITAL | Age: 57
DRG: 617 | End: 2022-10-05
Payer: COMMERCIAL

## 2022-10-05 LAB
ALBUMIN SERPL-MCNC: 2.6 GM/DL (ref 3.5–5)
ALBUMIN/GLOB SERPL: 0.9 RATIO (ref 1.1–2)
ALP SERPL-CCNC: 80 UNIT/L (ref 40–150)
ALT SERPL-CCNC: 11 UNIT/L (ref 0–55)
AST SERPL-CCNC: 14 UNIT/L (ref 5–34)
BASOPHILS # BLD AUTO: 0.04 X10(3)/MCL (ref 0–0.2)
BASOPHILS NFR BLD AUTO: 0.8 %
BILIRUBIN DIRECT+TOT PNL SERPL-MCNC: 0.2 MG/DL
BUN SERPL-MCNC: 7.4 MG/DL (ref 9.8–20.1)
CALCIUM SERPL-MCNC: 8.7 MG/DL (ref 8.4–10.2)
CHLORIDE SERPL-SCNC: 108 MMOL/L (ref 98–107)
CO2 SERPL-SCNC: 25 MMOL/L (ref 22–29)
CREAT SERPL-MCNC: 0.58 MG/DL (ref 0.55–1.02)
EOSINOPHIL # BLD AUTO: 0.15 X10(3)/MCL (ref 0–0.9)
EOSINOPHIL NFR BLD AUTO: 3 %
ERYTHROCYTE [DISTWIDTH] IN BLOOD BY AUTOMATED COUNT: 11.6 % (ref 11.5–17)
GFR SERPLBLD CREATININE-BSD FMLA CKD-EPI: >60 MLS/MIN/1.73/M2
GLOBULIN SER-MCNC: 2.9 GM/DL (ref 2.4–3.5)
GLUCOSE SERPL-MCNC: 129 MG/DL (ref 74–100)
HCT VFR BLD AUTO: 37.1 % (ref 37–47)
HGB BLD-MCNC: 12.4 GM/DL (ref 12–16)
IMM GRANULOCYTES # BLD AUTO: 0.01 X10(3)/MCL (ref 0–0.04)
IMM GRANULOCYTES NFR BLD AUTO: 0.2 %
LEFT ABI: 1.09
LEFT ARM BP: 167 MMHG
LEFT DORSALIS PEDIS: 168 MMHG
LEFT POSTERIOR TIBIAL: 182 MMHG
LYMPHOCYTES # BLD AUTO: 2.03 X10(3)/MCL (ref 0.6–4.6)
LYMPHOCYTES NFR BLD AUTO: 41.3 %
MAGNESIUM SERPL-MCNC: 2.1 MG/DL (ref 1.6–2.6)
MCH RBC QN AUTO: 29.7 PG (ref 27–31)
MCHC RBC AUTO-ENTMCNC: 33.4 MG/DL (ref 33–36)
MCV RBC AUTO: 88.8 FL (ref 80–94)
MONOCYTES # BLD AUTO: 0.48 X10(3)/MCL (ref 0.1–1.3)
MONOCYTES NFR BLD AUTO: 9.8 %
NEUTROPHILS # BLD AUTO: 2.2 X10(3)/MCL (ref 2.1–9.2)
NEUTROPHILS NFR BLD AUTO: 44.9 %
NRBC BLD AUTO-RTO: 0 %
PHOSPHATE SERPL-MCNC: 3.8 MG/DL (ref 2.3–4.7)
PLATELET # BLD AUTO: 317 X10(3)/MCL (ref 130–400)
PMV BLD AUTO: 10.2 FL (ref 7.4–10.4)
POCT GLUCOSE: 133 MG/DL (ref 70–110)
POCT GLUCOSE: 168 MG/DL (ref 70–110)
POCT GLUCOSE: 84 MG/DL (ref 70–110)
POTASSIUM SERPL-SCNC: 3.9 MMOL/L (ref 3.5–5.1)
PROT SERPL-MCNC: 5.5 GM/DL (ref 6.4–8.3)
RBC # BLD AUTO: 4.18 X10(6)/MCL (ref 4.2–5.4)
RIGHT ABI: 1.11
RIGHT ARM BP: 164 MMHG
RIGHT DORSALIS PEDIS: 185 MMHG
RIGHT POSTERIOR TIBIAL: 172 MMHG
SODIUM SERPL-SCNC: 141 MMOL/L (ref 136–145)
VANCOMYCIN SERPL-MCNC: 15.6 UG/ML (ref 15–20)
WBC # SPEC AUTO: 4.9 X10(3)/MCL (ref 4.5–11.5)

## 2022-10-05 PROCEDURE — 63600175 PHARM REV CODE 636 W HCPCS

## 2022-10-05 PROCEDURE — 80202 ASSAY OF VANCOMYCIN: CPT | Performed by: INTERNAL MEDICINE

## 2022-10-05 PROCEDURE — 36000707: Performed by: SURGERY

## 2022-10-05 PROCEDURE — 63600175 PHARM REV CODE 636 W HCPCS: Performed by: SPECIALIST

## 2022-10-05 PROCEDURE — 25000003 PHARM REV CODE 250: Performed by: NURSE ANESTHETIST, CERTIFIED REGISTERED

## 2022-10-05 PROCEDURE — 87070 CULTURE OTHR SPECIMN AEROBIC: CPT | Performed by: SURGERY

## 2022-10-05 PROCEDURE — 63600175 PHARM REV CODE 636 W HCPCS: Performed by: NURSE ANESTHETIST, CERTIFIED REGISTERED

## 2022-10-05 PROCEDURE — 87075 CULTR BACTERIA EXCEPT BLOOD: CPT | Performed by: SURGERY

## 2022-10-05 PROCEDURE — 25000003 PHARM REV CODE 250: Performed by: SPECIALIST

## 2022-10-05 PROCEDURE — 11000001 HC ACUTE MED/SURG PRIVATE ROOM

## 2022-10-05 PROCEDURE — 36415 COLL VENOUS BLD VENIPUNCTURE: CPT | Performed by: INTERNAL MEDICINE

## 2022-10-05 PROCEDURE — 85025 COMPLETE CBC W/AUTO DIFF WBC: CPT

## 2022-10-05 PROCEDURE — 25000003 PHARM REV CODE 250

## 2022-10-05 PROCEDURE — 36000706: Performed by: SURGERY

## 2022-10-05 PROCEDURE — S0030 INJECTION, METRONIDAZOLE: HCPCS | Performed by: NURSE ANESTHETIST, CERTIFIED REGISTERED

## 2022-10-05 PROCEDURE — 37000009 HC ANESTHESIA EA ADD 15 MINS: Performed by: SURGERY

## 2022-10-05 PROCEDURE — 36415 COLL VENOUS BLD VENIPUNCTURE: CPT

## 2022-10-05 PROCEDURE — S0030 INJECTION, METRONIDAZOLE: HCPCS

## 2022-10-05 PROCEDURE — 88305 TISSUE EXAM BY PATHOLOGIST: CPT | Performed by: SURGERY

## 2022-10-05 PROCEDURE — 87205 SMEAR GRAM STAIN: CPT | Performed by: SURGERY

## 2022-10-05 PROCEDURE — 37000008 HC ANESTHESIA 1ST 15 MINUTES: Performed by: SURGERY

## 2022-10-05 PROCEDURE — 80053 COMPREHEN METABOLIC PANEL: CPT

## 2022-10-05 PROCEDURE — 94761 N-INVAS EAR/PLS OXIMETRY MLT: CPT

## 2022-10-05 PROCEDURE — 83735 ASSAY OF MAGNESIUM: CPT

## 2022-10-05 PROCEDURE — 71000033 HC RECOVERY, INTIAL HOUR: Performed by: SURGERY

## 2022-10-05 PROCEDURE — 84100 ASSAY OF PHOSPHORUS: CPT

## 2022-10-05 RX ORDER — OXYCODONE AND ACETAMINOPHEN 5; 325 MG/1; MG/1
2 TABLET ORAL ONCE
Status: DISCONTINUED | OUTPATIENT
Start: 2022-10-05 | End: 2022-10-05

## 2022-10-05 RX ORDER — DIPHENHYDRAMINE HYDROCHLORIDE 50 MG/ML
25 INJECTION INTRAMUSCULAR; INTRAVENOUS ONCE AS NEEDED
Status: DISCONTINUED | OUTPATIENT
Start: 2022-10-05 | End: 2022-10-05

## 2022-10-05 RX ORDER — MIDAZOLAM HYDROCHLORIDE 1 MG/ML
5 INJECTION INTRAMUSCULAR; INTRAVENOUS ONCE AS NEEDED
Status: CANCELLED | OUTPATIENT
Start: 2022-10-05 | End: 2034-03-03

## 2022-10-05 RX ORDER — HYDROMORPHONE HYDROCHLORIDE 1 MG/ML
0.5 INJECTION, SOLUTION INTRAMUSCULAR; INTRAVENOUS; SUBCUTANEOUS EVERY 5 MIN PRN
Status: DISCONTINUED | OUTPATIENT
Start: 2022-10-05 | End: 2022-10-05

## 2022-10-05 RX ORDER — PROCHLORPERAZINE EDISYLATE 5 MG/ML
5 INJECTION INTRAMUSCULAR; INTRAVENOUS ONCE AS NEEDED
Status: DISCONTINUED | OUTPATIENT
Start: 2022-10-05 | End: 2022-10-05

## 2022-10-05 RX ORDER — MEPERIDINE HYDROCHLORIDE 25 MG/ML
12.5 INJECTION INTRAMUSCULAR; INTRAVENOUS; SUBCUTANEOUS ONCE
Status: DISCONTINUED | OUTPATIENT
Start: 2022-10-05 | End: 2022-10-05

## 2022-10-05 RX ORDER — ROPIVACAINE HYDROCHLORIDE 5 MG/ML
INJECTION, SOLUTION EPIDURAL; INFILTRATION; PERINEURAL
Status: COMPLETED | OUTPATIENT
Start: 2022-10-05 | End: 2022-10-05

## 2022-10-05 RX ORDER — SODIUM CHLORIDE, SODIUM LACTATE, POTASSIUM CHLORIDE, CALCIUM CHLORIDE 600; 310; 30; 20 MG/100ML; MG/100ML; MG/100ML; MG/100ML
INJECTION, SOLUTION INTRAVENOUS CONTINUOUS
Status: CANCELLED | OUTPATIENT
Start: 2022-10-05

## 2022-10-05 RX ORDER — FENTANYL CITRATE 50 UG/ML
INJECTION, SOLUTION INTRAMUSCULAR; INTRAVENOUS
Status: DISCONTINUED | OUTPATIENT
Start: 2022-10-05 | End: 2022-10-05

## 2022-10-05 RX ORDER — IPRATROPIUM BROMIDE AND ALBUTEROL SULFATE 2.5; .5 MG/3ML; MG/3ML
3 SOLUTION RESPIRATORY (INHALATION) ONCE AS NEEDED
Status: DISCONTINUED | OUTPATIENT
Start: 2022-10-05 | End: 2022-10-05

## 2022-10-05 RX ORDER — MIDAZOLAM HYDROCHLORIDE 1 MG/ML
INJECTION INTRAMUSCULAR; INTRAVENOUS
Status: COMPLETED
Start: 2022-10-05 | End: 2022-10-05

## 2022-10-05 RX ORDER — LIDOCAINE HYDROCHLORIDE 20 MG/ML
INJECTION, SOLUTION EPIDURAL; INFILTRATION; INTRACAUDAL; PERINEURAL
Status: COMPLETED | OUTPATIENT
Start: 2022-10-05 | End: 2022-10-05

## 2022-10-05 RX ORDER — MIDAZOLAM HYDROCHLORIDE 1 MG/ML
INJECTION INTRAMUSCULAR; INTRAVENOUS
Status: DISCONTINUED | OUTPATIENT
Start: 2022-10-05 | End: 2022-10-05

## 2022-10-05 RX ORDER — METRONIDAZOLE 500 MG/100ML
INJECTION, SOLUTION INTRAVENOUS
Status: DISCONTINUED | OUTPATIENT
Start: 2022-10-05 | End: 2022-10-05

## 2022-10-05 RX ORDER — KETAMINE HYDROCHLORIDE 100 MG/ML
INJECTION, SOLUTION INTRAMUSCULAR; INTRAVENOUS
Status: DISCONTINUED | OUTPATIENT
Start: 2022-10-05 | End: 2022-10-05

## 2022-10-05 RX ORDER — ONDANSETRON 2 MG/ML
4 INJECTION INTRAMUSCULAR; INTRAVENOUS ONCE
Status: DISCONTINUED | OUTPATIENT
Start: 2022-10-05 | End: 2022-10-05

## 2022-10-05 RX ORDER — HYDROMORPHONE HYDROCHLORIDE 1 MG/ML
0.2 INJECTION, SOLUTION INTRAMUSCULAR; INTRAVENOUS; SUBCUTANEOUS EVERY 5 MIN PRN
Status: DISCONTINUED | OUTPATIENT
Start: 2022-10-05 | End: 2022-10-05

## 2022-10-05 RX ADMIN — VANCOMYCIN HYDROCHLORIDE 1500 MG: 1 INJECTION, POWDER, LYOPHILIZED, FOR SOLUTION INTRAVENOUS at 04:10

## 2022-10-05 RX ADMIN — METRONIDAZOLE 500 MG: 5 INJECTION, SOLUTION INTRAVENOUS at 05:10

## 2022-10-05 RX ADMIN — ROPIVACAINE HYDROCHLORIDE 15 ML: 5 INJECTION, SOLUTION EPIDURAL; INFILTRATION; PERINEURAL at 10:10

## 2022-10-05 RX ADMIN — MIDAZOLAM 2 MG: 1 INJECTION INTRAMUSCULAR; INTRAVENOUS at 11:10

## 2022-10-05 RX ADMIN — CEFEPIME 2 G: 2 INJECTION, POWDER, FOR SOLUTION INTRAVENOUS at 09:10

## 2022-10-05 RX ADMIN — ENOXAPARIN SODIUM 40 MG: 40 INJECTION SUBCUTANEOUS at 05:10

## 2022-10-05 RX ADMIN — MIDAZOLAM 5 MG: 1 INJECTION INTRAMUSCULAR; INTRAVENOUS at 11:10

## 2022-10-05 RX ADMIN — SODIUM CHLORIDE: 9 INJECTION, SOLUTION INTRAVENOUS at 06:10

## 2022-10-05 RX ADMIN — FENTANYL CITRATE 100 MCG: 50 INJECTION, SOLUTION INTRAMUSCULAR; INTRAVENOUS at 11:10

## 2022-10-05 RX ADMIN — LIDOCAINE HYDROCHLORIDE 15 ML: 20 INJECTION, SOLUTION EPIDURAL; INFILTRATION; INTRACAUDAL; PERINEURAL at 10:10

## 2022-10-05 RX ADMIN — KETAMINE HYDROCHLORIDE 20 MG: 100 INJECTION, SOLUTION INTRAMUSCULAR; INTRAVENOUS at 11:10

## 2022-10-05 RX ADMIN — SODIUM CHLORIDE: 9 INJECTION, SOLUTION INTRAVENOUS at 09:10

## 2022-10-05 RX ADMIN — INSULIN DETEMIR 10 UNITS: 100 INJECTION, SOLUTION SUBCUTANEOUS at 08:10

## 2022-10-05 RX ADMIN — METRONIDAZOLE 500 MG: 5 INJECTION, SOLUTION INTRAVENOUS at 08:10

## 2022-10-05 RX ADMIN — INSULIN ASPART 2 UNITS: 100 INJECTION, SOLUTION INTRAVENOUS; SUBCUTANEOUS at 08:10

## 2022-10-05 RX ADMIN — CEFEPIME 2 G: 2 INJECTION, POWDER, FOR SOLUTION INTRAVENOUS at 02:10

## 2022-10-05 RX ADMIN — METRONIDAZOLE 500 MG: 5 INJECTION, SOLUTION INTRAVENOUS at 12:10

## 2022-10-05 RX ADMIN — CEFEPIME 2 G: 2 INJECTION, POWDER, FOR SOLUTION INTRAVENOUS at 06:10

## 2022-10-05 RX ADMIN — LOSARTAN POTASSIUM 25 MG: 25 TABLET, FILM COATED ORAL at 08:10

## 2022-10-05 RX ADMIN — KETAMINE HYDROCHLORIDE 30 MG: 100 INJECTION, SOLUTION INTRAMUSCULAR; INTRAVENOUS at 11:10

## 2022-10-05 RX ADMIN — SODIUM CHLORIDE: 9 INJECTION, SOLUTION INTRAVENOUS at 10:10

## 2022-10-05 RX ADMIN — METRONIDAZOLE 500 MG: 500 INJECTION, SOLUTION INTRAVENOUS at 11:10

## 2022-10-05 RX ADMIN — VANCOMYCIN HYDROCHLORIDE 1500 MG: 1 INJECTION, POWDER, LYOPHILIZED, FOR SOLUTION INTRAVENOUS at 06:10

## 2022-10-05 NOTE — PLAN OF CARE
Problem: Impaired Wound Healing  Goal: Optimal Wound Healing  Outcome: Ongoing, Progressing     Problem: Pain Acute  Goal: Acceptable Pain Control and Functional Ability  Outcome: Ongoing, Progressing     Problem: Diabetes Comorbidity  Goal: Blood Glucose Level Within Targeted Range  Outcome: Ongoing, Progressing

## 2022-10-05 NOTE — PROGRESS NOTES
Pt is seen in the OR with surgery team for right 3rd toe amputation. See operative report. Wound was loosely approximated with sutures allowing for closure over the bone while still allowing for drainage. Multiple samples sent to lab. Plans for dressing change at bedside with surgery team tomorrow. Pt to remain on bedrest with BRP for the remainder of the day today due to bleeding risks with dependency. Plans for PT reeval in AM. Will attempt NWB at first to decrease dehiscence risk. Pt states she is trying to obtain a knee scooter to assist with NWB status. Pt will most likely be ready for DC on Friday if she does well with PT and wound check tomorrow.

## 2022-10-05 NOTE — ANESTHESIA PREPROCEDURE EVALUATION
10/05/2022  Maribel Araiza is a 57 y.o., female for Left third toe amputation    History of DM2  - newly diagnosed    Vitals:    10/05/22 0418 10/05/22 0726 10/05/22 0733 10/05/22 0850   BP: 138/79  (!) 148/88 (!) 144/80   Pulse: 70  66    Resp:   20    Temp: 36.3 °C (97.4 °F)  36.5 °C (97.7 °F)    TempSrc: Oral  Oral    SpO2: 97% 99% 95%    Weight:       Height:           Lab Results   Component Value Date    WBC 4.9 10/05/2022    HGB 12.4 10/05/2022    HCT 37.1 10/05/2022     10/05/2022    ALT 11 10/05/2022    AST 14 10/05/2022     10/05/2022    K 3.9 10/05/2022    CREATININE 0.58 10/05/2022    BUN 7.4 (L) 10/05/2022    CO2 25 10/05/2022    HGBA1C 10.8 (H) 10/02/2022         Pre-op Assessment    I have reviewed the Patient Summary Reports.     I have reviewed the Nursing Notes. I have reviewed the NPO Status.   I have reviewed the Medications.     Review of Systems  Anesthesia Hx:  No problems with previous Anesthesia  History of prior surgery of interest to airway management or planning: Denies Family Hx of Anesthesia complications.   Denies Personal Hx of Anesthesia complications.   Hematology/Oncology:  Hematology Normal   Oncology Normal     EENT/Dental:EENT/Dental Normal   Cardiovascular:  Cardiovascular Normal     Pulmonary:  Pulmonary Normal    Renal/:  Renal/ Normal     Hepatic/GI:  Hepatic/GI Normal    Musculoskeletal:  Musculoskeletal Normal    Neurological:  Neurology Normal    Endocrine:   Diabetes, poorly controlled    Dermatological:  Skin Normal    Psych:  Psychiatric Normal           Physical Exam  General: Well nourished, Cooperative, Alert and Oriented    Airway:  Mallampati: I / I  Mouth Opening: Small, but > 3cm  TM Distance: 4 - 6 cm  Tongue: Normal  Neck ROM: Normal ROM    Dental:  Intact        Anesthesia Plan  Type of Anesthesia, risks & benefits  discussed:    Anesthesia Type: Gen Natural Airway  Intra-op Monitoring Plan: Standard ASA Monitors  Post Op Pain Control Plan: IV/PO Opioids PRN and peripheral nerve block  (medical reason for not using multimodal pain management)  Induction:  IV  Informed Consent: Informed consent signed with the Patient and all parties understand the risks and agree with anesthesia plan.  All questions answered. Patient consented to blood products? No  ASA Score: 3  Day of Surgery Review of History & Physical: H&P Update referred to the surgeon/provider.    Ready For Surgery From Anesthesia Perspective.     .

## 2022-10-05 NOTE — ANESTHESIA POSTPROCEDURE EVALUATION
Anesthesia Post Evaluation    Patient: Maribel Araiza    Procedure(s) Performed: Procedure(s) (LRB):  AMPUTATION, right third toe (Right)    Final Anesthesia Type: MAC      Patient location during evaluation: PACU  Patient participation: Yes- Able to Participate  Level of consciousness: awake and responds to stimulation  Post-procedure vital signs: reviewed and stable  Pain management: adequate  Airway patency: patent    PONV status at discharge: No PONV  Anesthetic complications: no      Cardiovascular status: blood pressure returned to baseline  Respiratory status: unassisted  Hydration status: euvolemic  Follow-up not needed.          Vitals Value Taken Time   /84 10/05/22 1325   Temp 36.6 °C (97.8 °F) 10/05/22 1325   Pulse 53 10/05/22 1325   Resp 20 10/05/22 1325   SpO2 96 % 10/05/22 1325         Event Time   Out of Recovery 10/05/2022 13:09:00         Pain/Umesh Score: Umesh Score: 10 (10/5/2022 12:47 PM)

## 2022-10-05 NOTE — MEDICAL/APP STUDENT
[facility] General Surgery Progress Note    Maribel Araiza   [id number]     Subjective  Maribel Araiza is a 57 y.o. female presenting with a wound on right third digit with osteomyelitis confirmed by MRI with surgery scheduled for today(10/5/2022). NAOE, patient reports no discomfort or pain on right foot. Patient is nervous for upcoming surgery. Patient is able to ambulate and NPO since midnight of last night 10/4/2022. Patient reports no f/n/v/SOB/CP.    Objective  Temp:  [97.4 °F (36.3 °C)-98.2 °F (36.8 °C)] 97.4 °F (36.3 °C)  Pulse:  [66-77] 70  Resp:  [18-20] 18  SpO2:  [95 %-98 %] 97 %  BP: (118-151)/(72-87) 138/79    I/O this shift:  In: 400 [IV Piggyback:400]  Out: -   I/O last 3 completed shifts:  In: 3040 [P.O.:1690; I.V.:900; IV Piggyback:450]  Out: 1 [Stool:1]    Physical Exam  Gen: NAD, AAOx3  CV: extremities wwp, regular rate, 2+ palpable radials, normal S1 and S2 sounds  Pulm: nonlabored, no increased wob, equal chest rise b/l, CTAB  Abd: s/nt/nd   Wounds:Right 3rd toe with white wound on dorsal skin with diffuse cellulitis and no discharge. Generalized swelling of the right feet and lower leg with decreased erythema than previous day on 10/4    Labs:      Recent Labs   Lab 10/04/22  0345 10/05/22  0409   WBC 5.8 4.9   HGB 11.9* 12.4   HCT 35.0* 37.1    317    141   CHLORIDE 110* 108*   CO2 24 25   BUN 8.0* 7.4*   CREATININE 0.52* 0.58   GLUCOSE 103* 129*   CALCIUM 8.5 8.7   MG 2.20 2.10   PHOS 3.9 3.8   ALKPHOS 76 80         A/P:    Maribel Araiza is a 57 y.o. female presenting with a wound on right third digit with osteomyelitis confirmed by MRI with surgery scheduled for today(10/5/2022).    -Proceed with scheduled surgery at 10:42 AM on 10/5/2022      Polo [id number] [id number]

## 2022-10-05 NOTE — ANESTHESIA PROCEDURE NOTES
Peripheral Block    Patient location during procedure: OR    Reason for block: primary anesthetic    Diagnosis: right toe osteomyelitis   Start time: 10/5/2022 10:40 AM  Timeout: 10/5/2022 10:40 AM   End time: 10/5/2022 10:48 AM    Staffing  Authorizing Provider: Rosy Davis MD  Performing Provider: Rosy Davis MD    Preanesthetic Checklist  Completed: patient identified, IV checked, site marked, risks and benefits discussed, surgical consent, monitors and equipment checked, pre-op evaluation and timeout performed  Peripheral Block  Patient position: supine  Prep: ChloraPrep  Patient monitoring: heart rate, cardiac monitor, continuous pulse ox, continuous capnometry and frequent blood pressure checks  Block type: ankle - saphenous, ankle - superficial peroneal, ankle - sural and ankle - tibial (Ankle - deep peroneal)  Laterality: right  Injection technique: single shot  Needle  Needle type: Stimuplex   Needle gauge: 22 G  Needle length: 2 in  Needle localization: anatomical landmarks     Assessment  Injection assessment: negative aspiration and negative parasthesia  Paresthesia pain: none  Heart rate change: no  Slow fractionated injection: yes    Medications:    Medications: ropivacaine (NAROPIN) injection 0.5% - Perineural   15 mL - 10/5/2022 10:40:00 AM  lidocaine (PF) 20 mg/mL (2%) injection - Other   15 mL - 10/5/2022 10:40:00 AM    Additional Notes  See anesthesia record for vitals.  Block performed under sedation.  Patient tolerated well.

## 2022-10-05 NOTE — NURSING
"Patient back to unit from post op. Patient AAO but tearful states " I will be ok." Patient noted to have small amount of drainage on dressing, VSS will continue to monitor.   "

## 2022-10-05 NOTE — OP NOTE
[facility] - [address] [place] [address] Surg Telemetry  Operative Note      Date of Procedure: 10/5/2022     Procedure: Procedure(s) (LRB):  AMPUTATION, right third toe (Right)     Surgeon(s) and Role:     * Glen Roth MD - Primary     * Miguel Woods MD - Assisting     * Kevin Carnes MD - Assisting    Pre-Operative Diagnosis: Osteomyelitis of the right 3rd toe    Post-Operative Diagnosis: Osteomyelitis of the right 3rd toe s/p amputation    Anesthesia: General/Regional    Estimated Blood Loss (EBL): 25cc           Specimens: Right 3rd toe     Description of Technical Procedures:  The patient was brought to the operating theater and placed in a supine position. MAC anesthesia was induced.  The right leg from mid shin down was prepped and draped in the usual sterile fashion.  Timeout was performed.    The incision was planned with a marking pen, and incision was made with a 10 blade scalpel circumferentially around the right 3rd toe. After incising the skin, hemostasis and further dissection was achieved with electrocautery. Electrocautery was used to separate the MTP joint at the 3rd toe, and the toe was removed to be sent for pathology and culture. Using a rongeur, a piece was removed from the metatarsal head at the 3rd position to be sent for further culture - acting as a margin of infection for the patient's osteomyelitis. The edges were smoothed over with a smaller rongeur, and further hemostasis was achieved with pressure and electrocautery. The wound was rinsed with sterile saline. After rinsing the wound, 2-0 vicryl sutures were used to approximate the deep tissues, and skin closure was achieved using 3-0 nylon vertical mattress sutures. The patient was cleaned and taken to PACU in stable condition without complications. All counts were accurate at the end of the procedure. Dr. Glen Roth was scrubbed and present for the entirety of the procedure.      Kevin Carnes MD  \[facility]\"" General Surgery PGY-1

## 2022-10-05 NOTE — TRANSFER OF CARE
Anesthesia Transfer of Care Note    Patient: Maribel Araiza    Procedure(s) Performed: Procedure(s) (LRB):  AMPUTATION, right third toe (Right)    Patient location: PACU    Transport from OR: Transported from OR on room air with adequate spontaneous ventilation    Post pain: adequate analgesia    Post assessment: no apparent anesthetic complications    Post vital signs: stable    Level of consciousness: awake and alert    Nausea/Vomiting: no nausea/vomiting    Complications: none    Transfer of care protocol was followed

## 2022-10-05 NOTE — PLAN OF CARE
10/05/22 1100   Discharge Assessment   Assessment Type Discharge Planning Assessment   Confirmed/corrected address, phone number and insurance Yes   Confirmed Demographics Correct on Facesheet   Source of Information patient   Reason For Admission Osteo   Lives With child(john), adult;spouse   Do you expect to return to your current living situation? Yes   Do you have help at home or someone to help you manage your care at home? No   Prior to hospitilization cognitive status: Alert/Oriented;No Deficits   Current cognitive status: Alert/Oriented;No Deficits   Walking or Climbing Stairs Difficulty none   Dressing/Bathing Difficulty none   Home Layout Able to live on 1st floor   Equipment Currently Used at Home none   Readmission within 30 days? No   Patient currently being followed by outpatient case management? No   Do you currently have service(s) that help you manage your care at home? No   Do you take prescription medications? Yes   Do you have prescription coverage? Yes   Coverage BCBS   Do you have any problems affording any of your prescribed medications? No   Is the patient taking medications as prescribed? yes   Who is going to help you get home at discharge? son, Jude   How do you get to doctors appointments? car, drives self   Do you take coumadin? No   Discharge Plan A Home   DME Needed Upon Discharge  none   Discharge Plan discussed with: Patient   Discharge Barriers Identified None   Relationship/Environment   Name(s) of Who Lives With Patient Augustus

## 2022-10-05 NOTE — PROGRESS NOTES
[facility]ards   Progress Note     Resident Team: [facility] Medicine List 1  Attending Physician: Deo Brunner MD  Resident: Arden Grajeda MD  Intern: Dayton Coleman Anton   [facility] Length of Stay: 1 days    Subjective:      Brief HPI:  Maribel Araiza is a 57 y.o. female with a history of gestational diabetes who presented to [facility] ED on 10/2/2022  with complaint of right foot 3rd toe wound. Patient reports that she first noticed the toe wound yesterday night. She did not notice the wound the night prior but does not having a foot sore in the same area a couple weeks ago. She normally has foot sores in that area due to her arched feet that scrapes against her shoes. She says the sores come and go but have never gotten this bad. She denies any pain in her toes but does complain of pain in her right calf. She says that she also get some swelling in her feet when she goes for a long drive. Of note, patient recently returned from [place] for her father's  which was a 15 hr drive this past . Patient was in emotional distress during the interview due to other family issues as well. Patient reports no past medical history except gestational diabetes during her pregnancies. She has not seen a physician in years. She says she does not smoke, only drinks alcohol socially, and denies any other drug use. She denies having any fevers, chills, SOB, chest pain, abdominal pain, dysuria, constipation or diarrhea.     Interval History:   Patient had no acute events overnight.  Denies any pain, shortness a breath, chest pain, dizziness, palpitations, nausea, vomiting, diarrhea.  Patient remains NPO for amputation of the right digit in a.m. and continues to require IV antibiotics.  Pt requesting to be followed up with me in  family medicine after discharge.   who is also hospitalized in this facility has been moved across the [place]. Pt feels better in regards to this difficult situation and is motivated to get  her glucose under control      Review of Systems   All other systems reviewed and are negative.        Objective:     Vital Signs (Most Recent):  Temp: 97.4 °F (36.3 °C) (10/05/22 0418)  Pulse: 70 (10/05/22 0418)  Resp: 18 (10/05/22 0029)  BP: 138/79 (10/05/22 0418)  SpO2: 97 % (10/05/22 0418)   Vital Signs (24h Range):  Temp:  [97.4 °F (36.3 °C)-98.2 °F (36.8 °C)] 97.4 °F (36.3 °C)  Pulse:  [66-77] 70  Resp:  [18-20] 18  SpO2:  [95 %-98 %] 97 %  BP: (118-151)/(72-87) 138/79     Physical Exam  Vitals reviewed.   Constitutional:       Appearance: Normal appearance.   HENT:      Head: Normocephalic and atraumatic.   Eyes:      Extraocular Movements: Extraocular movements intact.      Conjunctiva/sclera: Conjunctivae normal.      Pupils: Pupils are equal, round, and reactive to light.   Cardiovascular:      Rate and Rhythm: Normal rate and regular rhythm.      Pulses: Normal pulses.      Heart sounds: No murmur heard.  Pulmonary:      Effort: Pulmonary effort is normal. No respiratory distress.      Breath sounds: Normal breath sounds.   Chest:      Chest wall: No tenderness.   Abdominal:      General: Abdomen is flat. Bowel sounds are normal. There is no distension.      Palpations: Abdomen is soft.      Tenderness: There is no abdominal tenderness.   Musculoskeletal:         General: Normal range of motion.      Cervical back: Normal range of motion.   Skin:     General: Skin is warm.      Capillary Refill: Capillary refill takes less than 2 seconds.      Comments: Right lower extremity with wound open to air. Reduced erythema today.  Continues to have localized edema +2 to the right foot to mid shin. PPP Right toe 3rd proximal phalanx with localized edema, erythematous and macerated.  No further drainage noted.   Neurological:      General: No focal deficit present.      Mental Status: She is alert and oriented to person, place, and time.     Laboratory:  Most Recent Data:  CBC:   Recent Labs   Lab 10/04/22  9753    WBC 5.8   HGB 11.9*   HCT 35.0*          CMP:   Recent Labs   Lab 10/04/22  0345   CALCIUM 8.5   ALBUMIN 2.5*      K 3.8   CO2 24   BUN 8.0*   CREATININE 0.52*   ALKPHOS 76   ALT 15   AST 12   BILITOT 0.3       All pertinent lab results from the last 24 hours have been reviewed.      Microbiology Data Reviewed: yes  Pertinent Findings:  Blood cultures pending     Radiology:  Imaging Results              MRI Foot (Forefoot) Right W W/O Contrast (Final result)  Result time 10/03/22 09:38:27      Final result by Sly Davis MD (10/03/22 09:38:27)                   Impression:      Findings consistent with osteomyelitis of the proximal, middle, and distal phalanges of the 3rd digit.  Severe regional soft tissue edema noted.    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    Severe generalized soft tissue edema      Electronically signed by: Frank Davis MD  Date:    10/03/2022  Time:    09:38               Narrative:    EXAMINATION:  MRI FOOT (FOREFOOT) RIGHT W W/O CONTRAST    CLINICAL HISTORY:  Foot swelling, diabetic, osteomyelitis suspected, xray done;    TECHNIQUE:  MRI right forefoot performed before and after intravenous contrast using routine protocol.  18 cc contrast administered.    COMPARISON:  Comparison CT 10/02/2022    FINDINGS:  Intense osseous edema involving the proximal and middle phalanges of the 3rd digit with lesser intensity edema distal phalanx consistent with osteomyelitis.  Generalized severe soft tissue edema noted.  No obvious abscess.  Heterogeneous edema, advanced degenerative changes noted involving the tarsometatarsal joints including periarticular edema, fiber cystic change, osteophytic change, extensive erosive change consistent with neuropathic joint.  Severe joint soft tissue edema.  Flexor and extensor tendons grossly intact.                                       CT Foot W W/O Contrast Right (Final result)  Result time 10/02/22 17:54:27       Final result by Amanuel Ruiz MD (10/02/22 17:54:27)                   Impression:      Findings concerning for early osteomyelitis of the 3rd proximal phalanx.  No rim enhancing fluid collection is appreciated.  Inflammatory soft tissues throughout the distal foot.      Electronically signed by: Amanuel Ruiz  Date:    10/02/2022  Time:    17:54               Narrative:    CLINICAL HISTORY:  Trauma.    TECHNIQUE:  Right foot was performed without  contrast. There are sagittal and coronal reconstructed images available for review.    Automatic exposure control was utilized to reduce the patient's radiation dose.    DLP = 946    COMPARISON:  No prior imaging available for comparison.    FINDINGS:  Soft tissue edema with subcutaneous gas is noted within the soft tissues of the 3rd digit.  Findings concerning for infectious process.  No rim enhancing fluid is appreciated.  The proximal phalanx is sclerotic distally.  Early osteomyelitis is not excluded.  Chronic degenerative changes with subchondral cystic changes throughout the midfoot.                                       X-Ray Foot Complete Right (Final result)  Result time 10/02/22 13:51:22      Final result by Kirill Gorman MD (10/02/22 13:51:22)                   Impression:      No acute osseous abnormality identified.      Electronically signed by: Kirill Gorman  Date:    10/02/2022  Time:    13:51               Narrative:    EXAMINATION:  XR FOOT COMPLETE 3 VIEW RIGHT    CLINICAL HISTORY:  Cellulitis, unspecified    TECHNIQUE:  Three views    COMPARISON:  None available    FINDINGS:  There is prominent hallux valgus deformity.  Degenerative osteoarthritic changes involve the tarsometatarsal articulations and there is a prominent calcaneal enthesophyte.  No osteolytic destructive changes identified to suggest osteomyelitis.  There are soft tissue inflammations with scattered small air locules.                                    Current Medications:      Infusions:   sodium chloride 0.9% 100 mL/hr at 10/04/22 1811        Scheduled:   ceFEPime (MAXIPIME) IVPB  2 g Intravenous Q8H    enoxaparin  40 mg Subcutaneous Daily    insulin aspart U-100  5 Units Subcutaneous TIDWM    insulin detemir U-100  10 Units Subcutaneous QHS    losartan  25 mg Oral Daily    metronidazole  500 mg Intravenous Q8H    vancomycin (VANCOCIN) IVPB  1,500 mg Intravenous Q12H        PRN:  acetaminophen, dextrose 10%, dextrose 10%, glucagon (human recombinant), glucose, glucose, hydrALAZINE, HYDROmorphone, insulin aspart U-100, labetalol, melatonin, oxyCODONE, oxyCODONE, sodium chloride 0.9%, Pharmacy to dose Vancomycin consult **AND** vancomycin - pharmacy to dose    Antibiotics and Day Number of Therapy:  Vancomycin, Cefepime, and Flagyl Day 2    Intake/Output Summary (Last 24 hours) at 10/5/2022 0445  Last data filed at 10/4/2022 1704  Gross per 24 hour   Intake 2100 ml   Output 1 ml   Net 2099 ml       Lines/Drains/Airways       Peripheral Intravenous Line  Duration                  Peripheral IV - Single Lumen 10/04/22 1602 20 G Anterior;Left Forearm <1 day                  Assessment & Plan:     Right foot cellulitis vs osteomyelitis  - Patient reports acute worsening of right foot sore yesterday  - SIRS 2/4: Afebrile, tachycardic 108, tachypneic 20, WBC 8 on admission  - Elevated inflammatory markers ESR 70 and   - A1c elevated 10.8  - Blood cultures currently pending  - Right foot x-ray showed no osseus abnormalities  - Given dose of Cefepime and Vanc in the ED  - Ordered b/l venous US LE to assess for DVTs  - CT right foot showed concern for osteomyelitis of 3rd proximal phalanx  - MRI right foot confirmed osteomyelitis  - Continue on Vanc, Cefepime, and Flagyl; currently day 4  - Continue  cc/hr  - Surgery to perform right foot 3rd digit amputation on 10/05/2022.  Patient to be NPO after midnight   -No evidence of DVT in the visualized veins of the bilateral lower  extremities.  Edema visualized in the right calf.  -RIN normal   -CMP, CBC, Mg, Phos in am . Keep K>4, Phos>3, Mg>2 and will replace accordingly      New onset diabetes  - Reports history of gestational diabetes  - Does not take medications at home and does not check sugars at home  - A1c in the ED was 10.8  - Fasting a.m glucose pending at goal (Goal 140-180)  - Continue Detemir 10 units nightly and Aspart 5 units with meals  - Continue LEANN     High blood pressure  - BP elevated 151/103 in the ED  - Patient was anxious due to recently family circumstances  - Continue PRN labetalol and hydralazine  - Will continue to monitor    CODE STATUS: Full Code  Access: Peripheral  Antibiotics: Vancomycin, Cefepime, Flagyl (Day 4)  Diet: Diabetic  DVT Prophylaxis: Lovenox  GI Prophylaxis: none needed  Fluids: normal saline 100 ml/hr.     Disposition: MRI confirmed osteomyelitis.  Continue IV antibiotics.  Patient NPO  for amputation of right foot, 3rd digit today per surgery    Sherly Lawrence MD  [facility], HO[facility]

## 2022-10-05 NOTE — PROGRESS NOTES
[facility] Medicine Wards   Progress Note     Resident Team: [facility] Medicine List 1  Attending Physician: Deo Brunner MD  Resident: Arden Grajeda MD  Intern: Dayton Coleman Anton   [facility] Length of Stay: 1 days    Subjective:      Brief HPI:  Maribel Araiza is a 57 y.o. female with a history of gestational diabetes who presented to [facility] ED on 10/2/2022  with complaint of right foot 3rd toe wound. Patient reports that she first noticed the toe wound yesterday night. She did not notice the wound the night prior but does not having a foot sore in the same area a couple weeks ago. She normally has foot sores in that area due to her arched feet that scrapes against her shoes. She says the sores come and go but have never gotten this bad. She denies any pain in her toes but does complain of pain in her right calf. She says that she also get some swelling in her feet when she goes for a long drive. Of note, patient recently returned from [place] for her father's  which was a 15 hr drive this past . Patient was in emotional distress during the interview due to other family issues as well. Patient reports no past medical history except gestational diabetes during her pregnancies. She has not seen a physician in years. She says she does not smoke, only drinks alcohol socially, and denies any other drug use. She denies having any fevers, chills, SOB, chest pain, abdominal pain, dysuria, constipation or diarrhea.     Interval History:   Patient had no acute events overnight.      Review of Systems   All other systems reviewed and are negative.        Objective:     Vital Signs (Most Recent):  Temp: 96.8 °F (36 °C) (10/05/22 1057)  Pulse: 68 (10/05/22 1057)  Resp: 20 (10/05/22 1057)  BP: (!) 140/72 (10/05/22 1057)  SpO2: 100 % (10/05/22 1057)   Vital Signs (24h Range):  Temp:  [96.8 °F (36 °C)-98.2 °F (36.8 °C)] 96.8 °F (36 °C)  Pulse:  [66-77] 68  Resp:  [18-20] 20  SpO2:  [95 %-100 %] 100 %  BP: (118-151)/(72-88)  140/72     Physical Exam  Vitals reviewed.   Constitutional:       Appearance: Normal appearance.   HENT:      Head: Normocephalic and atraumatic.   Eyes:      Extraocular Movements: Extraocular movements intact.      Conjunctiva/sclera: Conjunctivae normal.      Pupils: Pupils are equal, round, and reactive to light.   Cardiovascular:      Rate and Rhythm: Normal rate and regular rhythm.      Pulses: Normal pulses.      Heart sounds: No murmur heard.  Pulmonary:      Effort: Pulmonary effort is normal. No respiratory distress.      Breath sounds: Normal breath sounds.   Chest:      Chest wall: No tenderness.   Abdominal:      General: Abdomen is flat. Bowel sounds are normal. There is no distension.      Palpations: Abdomen is soft.      Tenderness: There is no abdominal tenderness.   Musculoskeletal:         General: Normal range of motion.      Cervical back: Normal range of motion.   Skin:     General: Skin is warm.      Capillary Refill: Capillary refill takes less than 2 seconds.      Comments: Right lower extremity with wound open to air. Reduced erythema today.  Continues to have localized edema +2 to the right foot to mid shin. PPP Right toe 3rd proximal phalanx with localized edema, erythematous and macerated.  No further drainage noted.   Neurological:      General: No focal deficit present.      Mental Status: She is alert and oriented to person, place, and time.     Laboratory:  Most Recent Data:  CBC:   Recent Labs   Lab 10/04/22  0345 10/05/22  0409   WBC 5.8 4.9   HGB 11.9* 12.4   HCT 35.0* 37.1    317       CMP:   Recent Labs   Lab 10/05/22  0409   CALCIUM 8.7   ALBUMIN 2.6*      K 3.9   CO2 25   BUN 7.4*   CREATININE 0.58   ALKPHOS 80   ALT 11   AST 14   BILITOT 0.2       All pertinent lab results from the last 24 hours have been reviewed.      Microbiology Data Reviewed: yes  Pertinent Findings:  Blood cultures NGTD x 48 hours    Radiology:  Imaging Results              MRI Foot  (Forefoot) Right W W/O Contrast (Final result)  Result time 10/03/22 09:38:27      Final result by Sly Davis MD (10/03/22 09:38:27)                   Impression:      Findings consistent with osteomyelitis of the proximal, middle, and distal phalanges of the 3rd digit.  Severe regional soft tissue edema noted.    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    Severe generalized soft tissue edema      Electronically signed by: Frank Davis MD  Date:    10/03/2022  Time:    09:38               Narrative:    EXAMINATION:  MRI FOOT (FOREFOOT) RIGHT W W/O CONTRAST    CLINICAL HISTORY:  Foot swelling, diabetic, osteomyelitis suspected, xray done;    TECHNIQUE:  MRI right forefoot performed before and after intravenous contrast using routine protocol.  18 cc contrast administered.    COMPARISON:  Comparison CT 10/02/2022    FINDINGS:  Intense osseous edema involving the proximal and middle phalanges of the 3rd digit with lesser intensity edema distal phalanx consistent with osteomyelitis.  Generalized severe soft tissue edema noted.  No obvious abscess.  Heterogeneous edema, advanced degenerative changes noted involving the tarsometatarsal joints including periarticular edema, fiber cystic change, osteophytic change, extensive erosive change consistent with neuropathic joint.  Severe joint soft tissue edema.  Flexor and extensor tendons grossly intact.                                       CT Foot W W/O Contrast Right (Final result)  Result time 10/02/22 17:54:27      Final result by Amanuel Ruiz MD (10/02/22 17:54:27)                   Impression:      Findings concerning for early osteomyelitis of the 3rd proximal phalanx.  No rim enhancing fluid collection is appreciated.  Inflammatory soft tissues throughout the distal foot.      Electronically signed by: Amanuel Ruiz  Date:    10/02/2022  Time:    17:54               Narrative:    CLINICAL HISTORY:  Trauma.    TECHNIQUE:  Right  foot was performed without  contrast. There are sagittal and coronal reconstructed images available for review.    Automatic exposure control was utilized to reduce the patient's radiation dose.    DLP = 946    COMPARISON:  No prior imaging available for comparison.    FINDINGS:  Soft tissue edema with subcutaneous gas is noted within the soft tissues of the 3rd digit.  Findings concerning for infectious process.  No rim enhancing fluid is appreciated.  The proximal phalanx is sclerotic distally.  Early osteomyelitis is not excluded.  Chronic degenerative changes with subchondral cystic changes throughout the midfoot.                                       X-Ray Foot Complete Right (Final result)  Result time 10/02/22 13:51:22      Final result by Kirill Gorman MD (10/02/22 13:51:22)                   Impression:      No acute osseous abnormality identified.      Electronically signed by: Kirill Gorman  Date:    10/02/2022  Time:    13:51               Narrative:    EXAMINATION:  XR FOOT COMPLETE 3 VIEW RIGHT    CLINICAL HISTORY:  Cellulitis, unspecified    TECHNIQUE:  Three views    COMPARISON:  None available    FINDINGS:  There is prominent hallux valgus deformity.  Degenerative osteoarthritic changes involve the tarsometatarsal articulations and there is a prominent calcaneal enthesophyte.  No osteolytic destructive changes identified to suggest osteomyelitis.  There are soft tissue inflammations with scattered small air locules.                                    Current Medications:     Infusions:   sodium chloride 0.9% 100 mL/hr at 10/05/22 1035        Scheduled:   ceFEPime (MAXIPIME) IVPB  2 g Intravenous Q8H    enoxaparin  40 mg Subcutaneous Daily    insulin aspart U-100  5 Units Subcutaneous TIDWM    insulin detemir U-100  10 Units Subcutaneous QHS    losartan  25 mg Oral Daily    metronidazole  500 mg Intravenous Q8H    vancomycin (VANCOCIN) IVPB  1,500 mg Intravenous Q12H         PRN:  acetaminophen, dextrose 10%, dextrose 10%, glucagon (human recombinant), glucose, glucose, hydrALAZINE, HYDROmorphone, insulin aspart U-100, labetalol, melatonin, oxyCODONE, oxyCODONE, sodium chloride 0.9%, Pharmacy to dose Vancomycin consult **AND** vancomycin - pharmacy to dose    Antibiotics and Day Number of Therapy:  Vancomycin, Cefepime, and Flagyl Day 2    Intake/Output Summary (Last 24 hours) at 10/5/2022 1121  Last data filed at 10/5/2022 0645  Gross per 24 hour   Intake 1740 ml   Output 1 ml   Net 1739 ml       Lines/Drains/Airways       Peripheral Intravenous Line  Duration                  Peripheral IV - Single Lumen 10/04/22 1602 20 G Anterior;Left Forearm <1 day                  Assessment & Plan:     Right foot cellulitis vs osteomyelitis  S/p Right Toe amputation  - Patient reports acute worsening of right foot sore yesterday  - SIRS 2/4: Afebrile, tachycardic 108, tachypneic 20, WBC 8 on admission  - Elevated inflammatory markers ESR 70 and   - A1c elevated 10.8  - Blood cultures currently pending  - Right foot x-ray showed no osseus abnormalities  - Given dose of Cefepime and Vanc in the ED  - Ordered b/l venous US LE to assess for DVTs  - CT right foot showed concern for osteomyelitis of 3rd proximal phalanx  - MRI right foot confirmed osteomyelitis  - Continue on Vanc, Cefepime, and Flagyl; currently day 5  - Continue  cc/hr  - Surgery to perform right foot 3rd digit amputation on 10/05/2022.  Patient to be NPO after midnight   -No evidence of DVT in the visualized veins of the bilateral lower extremities.  Edema visualized in the right calf.  -RIN normal   -CMP, CBC, Mg, Phos in am . Keep K>4, Phos>3, Mg>2 and will replace accordingly      New onset diabetes  - Reports history of gestational diabetes  - Does not take medications at home and does not check sugars at home  - A1c in the ED was 10.8  - Fasting a.m glucose pending at goal (Goal 140-180)  - Continue Detemir 10  units nightly and Aspart 5 units with meals  - Continue LEANN     High blood pressure  - BP elevated 151/103 in the ED  - Patient was anxious due to recently family circumstances  - Continue PRN labetalol and hydralazine  - Will continue to monitor    CODE STATUS: Full Code  Access: Peripheral  Antibiotics: Vancomycin, Cefepime, Flagyl (Day 5)  Diet: Diabetic  DVT Prophylaxis: Lovenox  GI Prophylaxis: none needed  Fluids: normal saline 100 ml/hr.     Disposition: Continue IV antibiotics.  S/p amputation.        Sherly Lawrence MD  [employer] [facility], -2

## 2022-10-05 NOTE — PROGRESS NOTES
[facility] Progress Note    Maribel Araiza   [id number]   10/05/2022     Subjective  No acute events overnight, VSS, Tmax 98.2. Nervous about surgery today. Denies n/v, f/c, abdominal pain, chest pain.    Objective  Temp:  [97.4 °F (36.3 °C)-98.2 °F (36.8 °C)] 97.4 °F (36.3 °C)  Pulse:  [66-77] 70  Resp:  [18-20] 18  SpO2:  [95 %-98 %] 97 %  BP: (118-151)/(72-87) 138/79    I/O this shift:  In: 400 [IV Piggyback:400]  Out: -   I/O last 3 completed shifts:  In: 3040 [P.O.:1690; I.V.:900; IV Piggyback:450]  Out: 1 [Stool:1]    Physical Exam  Gen: NAD, AAOx3  CV: extremities wwp, regular rate, palpable radials  Pulm: nonlabored, no increased wob, equal chest rise b/l   Abd: s/nt/nd  Wounds: Right 3rd toe more dry compared to prior exams, but similar in overall appearance. Left open to air with OR bootie covering.     Labs:      Recent Labs   Lab 10/04/22  0345 10/05/22  0409   WBC 5.8 4.9   HGB 11.9* 12.4   HCT 35.0* 37.1    317    141   CHLORIDE 110* 108*   CO2 24 25   BUN 8.0* 7.4*   CREATININE 0.52* 0.58   GLUCOSE 103* 129*   CALCIUM 8.5 8.7   MG 2.20 2.10   PHOS 3.9 3.8   ALKPHOS 76 80     Imaging:   MRI Right Forefoot 10/3   - Findings consistent with osteomyelitis of the proximal, middle, and distal phalanges of the 3rd digit.  Severe regional soft tissue edema noted.   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    A/P:    Maribel Araiza is a 57 y.o. female with a PMH of gestational diabetes and recently diagnosed DM2 noted during this visit who presents with a right 3rd toe wound with surrounding cellulitis.    - OR today for right 3rd toe amputation. Leave open with wound vac vs closed pending intra-operative decision and appearance of surrounding tissues  - NPO until post-op  - Abx per primary    Kevin Carnes MD  [employer], PGY-1

## 2022-10-06 LAB
ALBUMIN SERPL-MCNC: 2.6 GM/DL (ref 3.5–5)
ALBUMIN/GLOB SERPL: 0.7 RATIO (ref 1.1–2)
ALP SERPL-CCNC: 75 UNIT/L (ref 40–150)
ALT SERPL-CCNC: 28 UNIT/L (ref 0–55)
AST SERPL-CCNC: 40 UNIT/L (ref 5–34)
BASOPHILS # BLD AUTO: 0.02 X10(3)/MCL (ref 0–0.2)
BASOPHILS NFR BLD AUTO: 0.4 %
BILIRUBIN DIRECT+TOT PNL SERPL-MCNC: 0.2 MG/DL
BUN SERPL-MCNC: 4.4 MG/DL (ref 9.8–20.1)
CALCIUM SERPL-MCNC: 8.9 MG/DL (ref 8.4–10.2)
CHLORIDE SERPL-SCNC: 108 MMOL/L (ref 98–107)
CO2 SERPL-SCNC: 24 MMOL/L (ref 22–29)
CREAT SERPL-MCNC: 0.53 MG/DL (ref 0.55–1.02)
EOSINOPHIL # BLD AUTO: 0.14 X10(3)/MCL (ref 0–0.9)
EOSINOPHIL NFR BLD AUTO: 2.7 %
ERYTHROCYTE [DISTWIDTH] IN BLOOD BY AUTOMATED COUNT: 11.5 % (ref 11.5–17)
GFR SERPLBLD CREATININE-BSD FMLA CKD-EPI: >60 MLS/MIN/1.73/M2
GLOBULIN SER-MCNC: 3.5 GM/DL (ref 2.4–3.5)
GLUCOSE SERPL-MCNC: 87 MG/DL (ref 74–100)
GRAM STN SPEC: NORMAL
HCT VFR BLD AUTO: 39.2 % (ref 37–47)
HGB BLD-MCNC: 12.9 GM/DL (ref 12–16)
IMM GRANULOCYTES # BLD AUTO: 0.02 X10(3)/MCL (ref 0–0.04)
IMM GRANULOCYTES NFR BLD AUTO: 0.4 %
LYMPHOCYTES # BLD AUTO: 1.77 X10(3)/MCL (ref 0.6–4.6)
LYMPHOCYTES NFR BLD AUTO: 34.7 %
MAGNESIUM SERPL-MCNC: 1.9 MG/DL (ref 1.6–2.6)
MCH RBC QN AUTO: 29.9 PG (ref 27–31)
MCHC RBC AUTO-ENTMCNC: 32.9 MG/DL (ref 33–36)
MCV RBC AUTO: 90.7 FL (ref 80–94)
MONOCYTES # BLD AUTO: 0.55 X10(3)/MCL (ref 0.1–1.3)
MONOCYTES NFR BLD AUTO: 10.8 %
NEUTROPHILS # BLD AUTO: 2.6 X10(3)/MCL (ref 2.1–9.2)
NEUTROPHILS NFR BLD AUTO: 51 %
NRBC BLD AUTO-RTO: 0 %
PHOSPHATE SERPL-MCNC: 3.4 MG/DL (ref 2.3–4.7)
PLATELET # BLD AUTO: 310 X10(3)/MCL (ref 130–400)
PMV BLD AUTO: 9.8 FL (ref 7.4–10.4)
POCT GLUCOSE: 176 MG/DL (ref 70–110)
POCT GLUCOSE: 181 MG/DL (ref 70–110)
POCT GLUCOSE: 219 MG/DL (ref 70–110)
POCT GLUCOSE: 87 MG/DL (ref 70–110)
POCT GLUCOSE: 94 MG/DL (ref 70–110)
POTASSIUM SERPL-SCNC: 3.5 MMOL/L (ref 3.5–5.1)
PROT SERPL-MCNC: 6.1 GM/DL (ref 6.4–8.3)
RBC # BLD AUTO: 4.32 X10(6)/MCL (ref 4.2–5.4)
SODIUM SERPL-SCNC: 141 MMOL/L (ref 136–145)
WBC # SPEC AUTO: 5.1 X10(3)/MCL (ref 4.5–11.5)

## 2022-10-06 PROCEDURE — 25000003 PHARM REV CODE 250

## 2022-10-06 PROCEDURE — S0030 INJECTION, METRONIDAZOLE: HCPCS

## 2022-10-06 PROCEDURE — 63600175 PHARM REV CODE 636 W HCPCS

## 2022-10-06 PROCEDURE — 63600175 PHARM REV CODE 636 W HCPCS: Performed by: NURSE PRACTITIONER

## 2022-10-06 PROCEDURE — 97162 PT EVAL MOD COMPLEX 30 MIN: CPT

## 2022-10-06 PROCEDURE — 25000003 PHARM REV CODE 250: Performed by: NURSE PRACTITIONER

## 2022-10-06 PROCEDURE — 80053 COMPREHEN METABOLIC PANEL: CPT

## 2022-10-06 PROCEDURE — 84100 ASSAY OF PHOSPHORUS: CPT

## 2022-10-06 PROCEDURE — 85025 COMPLETE CBC W/AUTO DIFF WBC: CPT

## 2022-10-06 PROCEDURE — 11000001 HC ACUTE MED/SURG PRIVATE ROOM

## 2022-10-06 PROCEDURE — 83735 ASSAY OF MAGNESIUM: CPT

## 2022-10-06 PROCEDURE — 36415 COLL VENOUS BLD VENIPUNCTURE: CPT

## 2022-10-06 RX ORDER — LOSARTAN POTASSIUM 25 MG/1
50 TABLET ORAL DAILY
Status: DISCONTINUED | OUTPATIENT
Start: 2022-10-06 | End: 2022-10-07 | Stop reason: HOSPADM

## 2022-10-06 RX ORDER — POTASSIUM CHLORIDE 7.45 MG/ML
10 INJECTION INTRAVENOUS ONCE
Status: COMPLETED | OUTPATIENT
Start: 2022-10-06 | End: 2022-10-06

## 2022-10-06 RX ORDER — POTASSIUM CHLORIDE 20 MEQ/1
40 TABLET, EXTENDED RELEASE ORAL ONCE
Status: COMPLETED | OUTPATIENT
Start: 2022-10-06 | End: 2022-10-06

## 2022-10-06 RX ADMIN — INSULIN ASPART 5 UNITS: 100 INJECTION, SOLUTION INTRAVENOUS; SUBCUTANEOUS at 12:10

## 2022-10-06 RX ADMIN — METRONIDAZOLE 500 MG: 5 INJECTION, SOLUTION INTRAVENOUS at 12:10

## 2022-10-06 RX ADMIN — CEFEPIME 2 G: 2 INJECTION, POWDER, FOR SOLUTION INTRAVENOUS at 09:10

## 2022-10-06 RX ADMIN — VANCOMYCIN HYDROCHLORIDE 1500 MG: 1 INJECTION, POWDER, LYOPHILIZED, FOR SOLUTION INTRAVENOUS at 04:10

## 2022-10-06 RX ADMIN — OXYCODONE HYDROCHLORIDE 5 MG: 5 TABLET ORAL at 09:10

## 2022-10-06 RX ADMIN — CEFEPIME 2 G: 2 INJECTION, POWDER, FOR SOLUTION INTRAVENOUS at 02:10

## 2022-10-06 RX ADMIN — LOSARTAN POTASSIUM 50 MG: 25 TABLET, FILM COATED ORAL at 08:10

## 2022-10-06 RX ADMIN — ENOXAPARIN SODIUM 40 MG: 40 INJECTION SUBCUTANEOUS at 04:10

## 2022-10-06 RX ADMIN — INSULIN ASPART 4 UNITS: 100 INJECTION, SOLUTION INTRAVENOUS; SUBCUTANEOUS at 04:10

## 2022-10-06 RX ADMIN — CEFEPIME 2 G: 2 INJECTION, POWDER, FOR SOLUTION INTRAVENOUS at 05:10

## 2022-10-06 RX ADMIN — POTASSIUM CHLORIDE 40 MEQ: 1500 TABLET, EXTENDED RELEASE ORAL at 08:10

## 2022-10-06 RX ADMIN — POTASSIUM CHLORIDE 10 MEQ: 7.45 INJECTION INTRAVENOUS at 08:10

## 2022-10-06 RX ADMIN — INSULIN ASPART 5 UNITS: 100 INJECTION, SOLUTION INTRAVENOUS; SUBCUTANEOUS at 04:10

## 2022-10-06 RX ADMIN — INSULIN ASPART 5 UNITS: 100 INJECTION, SOLUTION INTRAVENOUS; SUBCUTANEOUS at 08:10

## 2022-10-06 RX ADMIN — VANCOMYCIN HYDROCHLORIDE 1500 MG: 1 INJECTION, POWDER, LYOPHILIZED, FOR SOLUTION INTRAVENOUS at 05:10

## 2022-10-06 RX ADMIN — INSULIN ASPART 2 UNITS: 100 INJECTION, SOLUTION INTRAVENOUS; SUBCUTANEOUS at 08:10

## 2022-10-06 RX ADMIN — INSULIN DETEMIR 10 UNITS: 100 INJECTION, SOLUTION SUBCUTANEOUS at 08:10

## 2022-10-06 RX ADMIN — METRONIDAZOLE 500 MG: 5 INJECTION, SOLUTION INTRAVENOUS at 04:10

## 2022-10-06 RX ADMIN — METRONIDAZOLE 500 MG: 5 INJECTION, SOLUTION INTRAVENOUS at 08:10

## 2022-10-06 NOTE — PROGRESS NOTES
[facility] Medicine Wards   Progress Note     Resident Team: [facility] Medicine List 1  Attending Physician: Deo Brunner MD  Resident: Arden Grajeda MD  Intern: Dayton Coleman Anton   [facility] Length of Stay: 2 days    Subjective:      Brief HPI:  Maribel Araiza is a 57 y.o. female with a history of gestational diabetes who presented to [facility] ED on 10/2/2022  with complaint of right foot 3rd toe wound. Patient reports that she first noticed the toe wound yesterday night. She did not notice the wound the night prior but does not having a foot sore in the same area a couple weeks ago. She normally has foot sores in that area due to her arched feet that scrapes against her shoes. She says the sores come and go but have never gotten this bad. She denies any pain in her toes but does complain of pain in her right calf. She says that she also get some swelling in her feet when she goes for a long drive. Of note, patient recently returned from [place] for her father's  which was a 15 hr drive this past . Patient was in emotional distress during the interview due to other family issues as well. Patient reports no past medical history except gestational diabetes during her pregnancies. She has not seen a physician in years. She says she does not smoke, only drinks alcohol socially, and denies any other drug use. She denies having any fevers, chills, SOB, chest pain, abdominal pain, dysuria, constipation or diarrhea.     Interval History:   Patient had no acute events overnight.  S/p amputation.  Denies any pain, shortness a breath, chest pain, dizziness, palpitations, nausea, vomiting, diarrhea.  Dressing to right foot dry with old blood noted to the dressing.  Patient denies any pain to the foot.  No edema noted.  No erythema noted to the shin area.  A.m. glucose normal at 87.  Will require potassium replacement (3.5).  She had some elevated blood pressure readings over the past 24 hours 157/77, 170/100.  Surgery  obtain an aerobic culture, tissue cultures and g stain.  Blood cultures from 10/22/2020 2-times 72 hours.  Waiting for confirmation of clean margins.  Patient is tolerating oral p.o. The wound care nurse recommended nonweightbearing status and to utilize a knee scooter.  Her  left for rehab yesterday at a different facility.  She has remained afebrile.  For wound care nurse expects discharge tomorrow from their standpoint.      Review of Systems   All other systems reviewed and are negative.   As stated above     Objective:     Vital Signs (Most Recent):  Temp: 98.1 °F (36.7 °C) (10/06/22 1132)  Pulse: 63 (10/06/22 0741)  Resp: 18 (10/06/22 1132)  BP: (!) 147/85 (10/06/22 1132)  SpO2: 96 % (10/06/22 0741)   Vital Signs (24h Range):  Temp:  [97.8 °F (36.6 °C)-98.5 °F (36.9 °C)] 98.1 °F (36.7 °C)  Pulse:  [53-79] 63  Resp:  [16-20] 18  SpO2:  [96 %-98 %] 96 %  BP: (126-157)/(72-85) 147/85     Physical Exam  Vitals reviewed.   Constitutional:       Appearance: Normal appearance.   HENT:      Head: Normocephalic and atraumatic.   Eyes:      Extraocular Movements: Extraocular movements intact.      Conjunctiva/sclera: Conjunctivae normal.      Pupils: Pupils are equal, round, and reactive to light.   Cardiovascular:      Rate and Rhythm: Normal rate and regular rhythm.      Pulses: Normal pulses.      Heart sounds: No murmur heard.  Pulmonary:      Effort: Pulmonary effort is normal. No respiratory distress.      Breath sounds: Normal breath sounds.   Chest:      Chest wall: No tenderness.   Abdominal:      General: Abdomen is flat. Bowel sounds are normal. There is no distension.      Palpations: Abdomen is soft.      Tenderness: There is no abdominal tenderness.   Musculoskeletal:         General: Normal range of motion.      Cervical back: Normal range of motion.   Skin:     General: Skin is warm.      Capillary Refill: Capillary refill takes less than 2 seconds.      Comments: Right foot with dressing with old  blood noted.  No edema noted to  the right lower extremity.  No erythema noted to the right lower extremity.  PPP shahram.    Neurological:      General: No focal deficit present.      Mental Status: She is alert and oriented to person, place, and time.     Laboratory:  Most Recent Data:  CBC:   Recent Labs   Lab 10/05/22  0409 10/06/22  0355   WBC 4.9 5.1   HGB 12.4 12.9   HCT 37.1 39.2    310       CMP:   Recent Labs   Lab 10/06/22  0355   CALCIUM 8.9   ALBUMIN 2.6*      K 3.5   CO2 24   BUN 4.4*   CREATININE 0.53*   ALKPHOS 75   ALT 28   AST 40*   BILITOT 0.2       All pertinent lab results from the last 24 hours have been reviewed.      Microbiology Data Reviewed: yes  Pertinent Findings:  Blood cultures 10/2/22 NGTD x 72 hours  S/p surgery amputation anaerobic cultures, tissue cultures and Gram stain pending 10/5/22    Radiology:  Imaging Results              MRI Foot (Forefoot) Right W W/O Contrast (Final result)  Result time 10/03/22 09:38:27      Final result by Sly Davis MD (10/03/22 09:38:27)                   Impression:      Findings consistent with osteomyelitis of the proximal, middle, and distal phalanges of the 3rd digit.  Severe regional soft tissue edema noted.    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    Severe generalized soft tissue edema      Electronically signed by: Frank Davis MD  Date:    10/03/2022  Time:    09:38               Narrative:    EXAMINATION:  MRI FOOT (FOREFOOT) RIGHT W W/O CONTRAST    CLINICAL HISTORY:  Foot swelling, diabetic, osteomyelitis suspected, xray done;    TECHNIQUE:  MRI right forefoot performed before and after intravenous contrast using routine protocol.  18 cc contrast administered.    COMPARISON:  Comparison CT 10/02/2022    FINDINGS:  Intense osseous edema involving the proximal and middle phalanges of the 3rd digit with lesser intensity edema distal phalanx consistent with osteomyelitis.  Generalized severe soft  tissue edema noted.  No obvious abscess.  Heterogeneous edema, advanced degenerative changes noted involving the tarsometatarsal joints including periarticular edema, fiber cystic change, osteophytic change, extensive erosive change consistent with neuropathic joint.  Severe joint soft tissue edema.  Flexor and extensor tendons grossly intact.                                       CT Foot W W/O Contrast Right (Final result)  Result time 10/02/22 17:54:27      Final result by Amanuel Ruiz MD (10/02/22 17:54:27)                   Impression:      Findings concerning for early osteomyelitis of the 3rd proximal phalanx.  No rim enhancing fluid collection is appreciated.  Inflammatory soft tissues throughout the distal foot.      Electronically signed by: Amanuel Ruiz  Date:    10/02/2022  Time:    17:54               Narrative:    CLINICAL HISTORY:  Trauma.    TECHNIQUE:  Right foot was performed without  contrast. There are sagittal and coronal reconstructed images available for review.    Automatic exposure control was utilized to reduce the patient's radiation dose.    DLP = 946    COMPARISON:  No prior imaging available for comparison.    FINDINGS:  Soft tissue edema with subcutaneous gas is noted within the soft tissues of the 3rd digit.  Findings concerning for infectious process.  No rim enhancing fluid is appreciated.  The proximal phalanx is sclerotic distally.  Early osteomyelitis is not excluded.  Chronic degenerative changes with subchondral cystic changes throughout the midfoot.                                       X-Ray Foot Complete Right (Final result)  Result time 10/02/22 13:51:22      Final result by Kirill Gorman MD (10/02/22 13:51:22)                   Impression:      No acute osseous abnormality identified.      Electronically signed by: Kirill Gorman  Date:    10/02/2022  Time:    13:51               Narrative:    EXAMINATION:  XR FOOT COMPLETE 3 VIEW RIGHT    CLINICAL  HISTORY:  Cellulitis, unspecified    TECHNIQUE:  Three views    COMPARISON:  None available    FINDINGS:  There is prominent hallux valgus deformity.  Degenerative osteoarthritic changes involve the tarsometatarsal articulations and there is a prominent calcaneal enthesophyte.  No osteolytic destructive changes identified to suggest osteomyelitis.  There are soft tissue inflammations with scattered small air locules.                                    Current Medications:     Infusions:         Scheduled:   ceFEPime (MAXIPIME) IVPB  2 g Intravenous Q8H    enoxaparin  40 mg Subcutaneous Daily    insulin aspart U-100  5 Units Subcutaneous TIDWM    insulin detemir U-100  10 Units Subcutaneous QHS    losartan  50 mg Oral Daily    metronidazole  500 mg Intravenous Q8H    vancomycin (VANCOCIN) IVPB  1,500 mg Intravenous Q12H        PRN:  acetaminophen, dextrose 10%, dextrose 10%, glucagon (human recombinant), glucose, glucose, hydrALAZINE, HYDROmorphone, insulin aspart U-100, labetalol, melatonin, oxyCODONE, oxyCODONE, sodium chloride 0.9%, Pharmacy to dose Vancomycin consult **AND** vancomycin - pharmacy to dose    Antibiotics and Day Number of Therapy:  Vancomycin, Cefepime, and Flagyl Day 2    Intake/Output Summary (Last 24 hours) at 10/6/2022 1254  Last data filed at 10/6/2022 1153  Gross per 24 hour   Intake 660 ml   Output 3025 ml   Net -2365 ml       Lines/Drains/Airways       Peripheral Intravenous Line  Duration                  Peripheral IV - Single Lumen 10/04/22 1602 20 G Anterior;Left Forearm 1 day                  Assessment & Plan:     Right foot cellulitis vs osteomyelitis  S/p Amputation Right foot 3rd digit  - Patient reports acute worsening of right foot sore yesterday  - SIRS 2/4: Afebrile, tachycardic 108, tachypneic 20, WBC 8 on admission  - Elevated inflammatory markers ESR 70 and   - A1c elevated 10.8  - Blood cultures currently pending  - Right foot x-ray showed no osseus  abnormalities  - Given dose of Cefepime and Vanc in the ED  - Ordered b/l venous US LE to assess for DVTs  - CT right foot showed concern for osteomyelitis of 3rd proximal phalanx  - MRI right foot confirmed osteomyelitis  - Continue on Vanc, Cefepime, and Flagyl; currently day 6  - Continue  cc/hr  - Surgery performed to  right foot 3rd digit amputation on 10/05/2022.    -No evidence of DVT in the visualized veins of the bilateral lower extremities.  Edema visualized in the right calf.  -RIN normal   -CMP, CBC, Mg, Phos in am . Keep K>4, Phos>3, Mg>2   and will replace accordingly  -Blood cultures 10/2/22 NGTD x 72 hours  S/p surgery amputation anaerobic cultures, tissue cultures and Gram stain pending 10/5/22  -[facility] outpatient basis  -Discontinued IV fluids patient is tolerating oral p.o.      New onset diabetes  - Reports history of gestational diabetes  - Does not take medications at home and does not check sugars at home  - A1c in the ED was 10.8  - Fasting a.m glucose pending at goal (Goal 140-180)  - Continue Detemir 10 units nightly and Aspart 5 units with meals  - Continue LEANN  -normal a.m. glucose.  Expect that prior to discharge will discontinue the insulin and start patient on oral diabetic medications     High blood pressure  - BP elevated 151/103 in the ED  - Patient was anxious due to recently family circumstances  - Continue PRN labetalol and hydralazine  -had elevated blood pressure readings will add losartan 50 mg daily  - Will continue to monitor    CODE STATUS: Full Code  Access: Peripheral  Antibiotics: Vancomycin, Cefepime, Flagyl (Day 5)  Diet: Diabetic  DVT Prophylaxis: Lovenox  GI Prophylaxis: none needed  Fluids: none    Disposition:  Status post right foot 3rd toe amputation.  Continue IV antibiotics.        Sherly Lawrence MD  [facility], HO-2

## 2022-10-06 NOTE — MEDICAL/APP STUDENT
[facility] General Surgery Progress Note    Maribel Araiza   [id number]     Subjective  Maribel Araiza 57 y.o. female with PMHx of untreated T2DM, POD 1 s/p right third toe removal on 10/5/2022. NAOE, patient reports no pain on right foot. Patient was advised to not bear weight on right foot due to increased risk of dehiscence. No BM since morning before surgery on 10/5 but has had adequate urination. Patient tolerating regular diet. No n/v/f/CP/SOB.    Objective  Temp:  [96.8 °F (36 °C)-98.5 °F (36.9 °C)] 98.5 °F (36.9 °C)  Pulse:  [53-79] 67  Resp:  [16-20] 16  SpO2:  [95 %-100 %] 96 %  BP: (126-171)/() 157/77    I/O this shift:  In: 240 [P.O.:240]  Out: 1900 [Urine:1900]  I/O last 3 completed shifts:  In: 2700 [P.O.:900; I.V.:1400; IV Piggyback:400]  Out: 1 [Stool:1]    Physical Exam  Gen: NAD, AAOx3  CV: extremities wwp, regular rate, palpable radials, normal s1 and s2 sounds  Pulm: nonlabored, no increased wob, equal chest rise b/l , CTAB  Abd: s/nt/nd   Wounds:drainage of serosanguinous discharge at incision site on wound wrapping on right foot.    Labs:      Recent Labs   Lab 10/05/22  0409 10/06/22  0355   WBC 4.9 5.1   HGB 12.4 12.9   HCT 37.1 39.2    310    141   CHLORIDE 108* 108*   CO2 25 24   BUN 7.4* 4.4*   CREATININE 0.58 0.53*   GLUCOSE 129* 87   CALCIUM 8.7 8.9   MG 2.10 1.90   PHOS 3.8 3.4   ALKPHOS 80 75       Micro:   Tissue culture, anaerobic culture, and gram stain pending since 10/5    Path:  Specimen of right third toe pending, sent to path on 10/5    A/P:  Maribel Araiza 57 y.o. female with PMHx of untreated T2DM, POD 1 s/p right third toe removal on 10/5/2022    -reduce weight bearing on right foot, consult with wound care to obtain a right knee scooter.        Polo Thomas  [employer] MS3

## 2022-10-06 NOTE — PROGRESS NOTES
Patient is seen at bedside with surgery team for post op dressing change of right 3rd toe amputation site. Pt was premedicated and tolerated well. Pt worked with therapy prior to dressing change and was able to maintain non-weight bearing status. Incision line is noted to be loosely approximated with sutures allowing for coverage over the bone while still allowing for drainage. Maceration noted related to xeroform, betadine used today. Plans for DC tomorrow with wound care clinic follow up next week. Pt feels like she can do her own wound care but I may look into home health for extra support. Plans for pt teaching tomorrow. Pt will also be given supplies needed.

## 2022-10-06 NOTE — PROGRESS NOTES
[facility] Medicine Wards   Progress Note     Resident Team: [facility] Medicine List 1  Attending Physician: Deo Brunner MD  Resident: Arden Grajeda MD  Intern: Dayton Coleman Anton   [facility] Length of Stay: 1 days    Subjective:      Brief HPI:  Maribel Araiza is a 57 y.o. female with a history of gestational diabetes who presented to [facility] ED on 10/2/2022  with complaint of right foot 3rd toe wound. Patient reports that she first noticed the toe wound yesterday night. She did not notice the wound the night prior but does not having a foot sore in the same area a couple weeks ago. She normally has foot sores in that area due to her arched feet that scrapes against her shoes. She says the sores come and go but have never gotten this bad. She denies any pain in her toes but does complain of pain in her right calf. She says that she also get some swelling in her feet when she goes for a long drive. Of note, patient recently returned from [place] for her father's  which was a 15 hr drive this past . Patient was in emotional distress during the interview due to other family issues as well. Patient reports no past medical history except gestational diabetes during her pregnancies. She has not seen a physician in years. She says she does not smoke, only drinks alcohol socially, and denies any other drug use. She denies having any fevers, chills, SOB, chest pain, abdominal pain, dysuria, constipation or diarrhea.     Interval History:   Patient had no acute events overnight.  S/p amputation.  Her  has recently moved to the rehab facility.  She denies any pain to the right foot.  On physical exam there is decreased erythema and no edema noted to either lower extremity  Denies any pain, shortness a breath, chest pain, dizziness, palpitations, nausea, vomiting, diarrhea.  Right foot with old blood noted to the dressing only.  She is tolerating p.o. food items.  Wound Care and surgery to determine whether or not  she will need a wound VAC. currently waiting on surgery to determine if she will need any further surgery if clean margins are not obtained.  Cultures obtained intraoperatively are pending.  A.m. glucose have been normal and it is likely that she will not require insulin after discharge.      Review of Systems   All other systems reviewed and are negative.   As stated above     Objective:     Vital Signs (Most Recent):  Temp: 98.4 °F (36.9 °C) (10/05/22 1917)  Pulse: 71 (10/05/22 1917)  Resp: 20 (10/05/22 1325)  BP: (!) 146/81 (10/05/22 1917)  SpO2: 96 % (10/05/22 1917)   Vital Signs (24h Range):  Temp:  [96.8 °F (36 °C)-98.4 °F (36.9 °C)] 98.4 °F (36.9 °C)  Pulse:  [53-79] 71  Resp:  [18-20] 20  SpO2:  [95 %-100 %] 96 %  BP: (128-171)/() 146/81     Physical Exam  Vitals reviewed.   Constitutional:       Appearance: Normal appearance.   HENT:      Head: Normocephalic and atraumatic.   Eyes:      Extraocular Movements: Extraocular movements intact.      Conjunctiva/sclera: Conjunctivae normal.      Pupils: Pupils are equal, round, and reactive to light.   Cardiovascular:      Rate and Rhythm: Normal rate and regular rhythm.      Pulses: Normal pulses.      Heart sounds: No murmur heard.  Pulmonary:      Effort: Pulmonary effort is normal. No respiratory distress.      Breath sounds: Normal breath sounds.   Chest:      Chest wall: No tenderness.   Abdominal:      General: Abdomen is flat. Bowel sounds are normal. There is no distension.      Palpations: Abdomen is soft.      Tenderness: There is no abdominal tenderness.   Musculoskeletal:         General: Normal range of motion.      Cervical back: Normal range of motion.   Skin:     General: Skin is warm.      Capillary Refill: Capillary refill takes less than 2 seconds.      Comments: Right lower extremity without any edema.  No erythema.  Right foot dressing dry and intact with some old dry blood noted to the dressing.  PPPD   Neurological:      General: No  focal deficit present.      Mental Status: She is alert and oriented to person, place, and time.     Laboratory:  Most Recent Data:  CBC:   Recent Labs   Lab 10/04/22  0345 10/05/22  0409   WBC 5.8 4.9   HGB 11.9* 12.4   HCT 35.0* 37.1    317       CMP:   Recent Labs   Lab 10/05/22  0409   CALCIUM 8.7   ALBUMIN 2.6*      K 3.9   CO2 25   BUN 7.4*   CREATININE 0.58   ALKPHOS 80   ALT 11   AST 14   BILITOT 0.2       All pertinent lab results from the last 24 hours have been reviewed.      Microbiology Data Reviewed: yes  Pertinent Findings:  Blood cultures 10/2/22 NGTD x 72 hours  S/p surgery amputation anaerobic cultures, tissue cultures and Gram stain pending 10/5/22    Radiology:  Imaging Results              MRI Foot (Forefoot) Right W W/O Contrast (Final result)  Result time 10/03/22 09:38:27      Final result by Sly Davis MD (10/03/22 09:38:27)                   Impression:      Findings consistent with osteomyelitis of the proximal, middle, and distal phalanges of the 3rd digit.  Severe regional soft tissue edema noted.    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    Severe generalized soft tissue edema      Electronically signed by: Frank Davis MD  Date:    10/03/2022  Time:    09:38               Narrative:    EXAMINATION:  MRI FOOT (FOREFOOT) RIGHT W W/O CONTRAST    CLINICAL HISTORY:  Foot swelling, diabetic, osteomyelitis suspected, xray done;    TECHNIQUE:  MRI right forefoot performed before and after intravenous contrast using routine protocol.  18 cc contrast administered.    COMPARISON:  Comparison CT 10/02/2022    FINDINGS:  Intense osseous edema involving the proximal and middle phalanges of the 3rd digit with lesser intensity edema distal phalanx consistent with osteomyelitis.  Generalized severe soft tissue edema noted.  No obvious abscess.  Heterogeneous edema, advanced degenerative changes noted involving the tarsometatarsal joints including  periarticular edema, fiber cystic change, osteophytic change, extensive erosive change consistent with neuropathic joint.  Severe joint soft tissue edema.  Flexor and extensor tendons grossly intact.                                       CT Foot W W/O Contrast Right (Final result)  Result time 10/02/22 17:54:27      Final result by Amanuel Ruiz MD (10/02/22 17:54:27)                   Impression:      Findings concerning for early osteomyelitis of the 3rd proximal phalanx.  No rim enhancing fluid collection is appreciated.  Inflammatory soft tissues throughout the distal foot.      Electronically signed by: Amanuel Ruiz  Date:    10/02/2022  Time:    17:54               Narrative:    CLINICAL HISTORY:  Trauma.    TECHNIQUE:  Right foot was performed without  contrast. There are sagittal and coronal reconstructed images available for review.    Automatic exposure control was utilized to reduce the patient's radiation dose.    DLP = 946    COMPARISON:  No prior imaging available for comparison.    FINDINGS:  Soft tissue edema with subcutaneous gas is noted within the soft tissues of the 3rd digit.  Findings concerning for infectious process.  No rim enhancing fluid is appreciated.  The proximal phalanx is sclerotic distally.  Early osteomyelitis is not excluded.  Chronic degenerative changes with subchondral cystic changes throughout the midfoot.                                       X-Ray Foot Complete Right (Final result)  Result time 10/02/22 13:51:22      Final result by Kirill Gorman MD (10/02/22 13:51:22)                   Impression:      No acute osseous abnormality identified.      Electronically signed by: Kirill Gorman  Date:    10/02/2022  Time:    13:51               Narrative:    EXAMINATION:  XR FOOT COMPLETE 3 VIEW RIGHT    CLINICAL HISTORY:  Cellulitis, unspecified    TECHNIQUE:  Three views    COMPARISON:  None available    FINDINGS:  There is prominent hallux valgus deformity.  Degenerative  osteoarthritic changes involve the tarsometatarsal articulations and there is a prominent calcaneal enthesophyte.  No osteolytic destructive changes identified to suggest osteomyelitis.  There are soft tissue inflammations with scattered small air locules.                                    Current Medications:     Infusions:   sodium chloride 0.9% 100 mL/hr at 10/05/22 1035        Scheduled:   ceFEPime (MAXIPIME) IVPB  2 g Intravenous Q8H    enoxaparin  40 mg Subcutaneous Daily    insulin aspart U-100  5 Units Subcutaneous TIDWM    insulin detemir U-100  10 Units Subcutaneous QHS    losartan  25 mg Oral Daily    metronidazole  500 mg Intravenous Q8H    vancomycin (VANCOCIN) IVPB  1,500 mg Intravenous Q12H        PRN:  acetaminophen, dextrose 10%, dextrose 10%, glucagon (human recombinant), glucose, glucose, hydrALAZINE, HYDROmorphone, insulin aspart U-100, labetalol, melatonin, oxyCODONE, oxyCODONE, sodium chloride 0.9%, Pharmacy to dose Vancomycin consult **AND** vancomycin - pharmacy to dose    Antibiotics and Day Number of Therapy:  Vancomycin, Cefepime, and Flagyl Day 2    Intake/Output Summary (Last 24 hours) at 10/5/2022 1927  Last data filed at 10/5/2022 1230  Gross per 24 hour   Intake 1800 ml   Output --   Net 1800 ml       Lines/Drains/Airways       Peripheral Intravenous Line  Duration                  Peripheral IV - Single Lumen 10/04/22 1602 20 G Anterior;Left Forearm 1 day                  Assessment & Plan:     Right foot cellulitis vs osteomyelitis  S/p Amputation Right foot 3rd digit  - Patient reports acute worsening of right foot sore yesterday  - SIRS 2/4: Afebrile, tachycardic 108, tachypneic 20, WBC 8 on admission  - Elevated inflammatory markers ESR 70 and   - A1c elevated 10.8  - Blood cultures currently pending  - Right foot x-ray showed no osseus abnormalities  - Given dose of Cefepime and Vanc in the ED  - Ordered b/l venous US LE to assess for DVTs  - CT right foot showed concern  for osteomyelitis of 3rd proximal phalanx  - MRI right foot confirmed osteomyelitis  - Continue on Vanc, Cefepime, and Flagyl; currently day 5  - discontinue  cc/hr.  Patient is tolerating p.o  - Surgery to perform right foot 3rd digit amputation on 10/05/2022.     -No evidence of DVT in the visualized veins of the bilateral lower extremities.  Edema visualized in the right calf.  -RIN normal   -CMP, CBC, Mg, Phos in am . Keep K>4, Phos>3, Mg>2   and will replace accordingly  -Blood cultures 10/2/22 NGTD x 72 hours  S/p surgery amputation anaerobic cultures, tissue cultures and Gram stain pending 10/5/22  -if margins are clear will discontinue IV antibiotics and patient most likely to be discharged tomorrow.  -currently nonweightbearing status and will follow-up with Wound Care Clinic after discharge      New onset diabetes  - Reports history of gestational diabetes  - Does not take medications at home and does not check sugars at home  - A1c in the ED was 10.8  - Fasting a.m glucose pending at goal (Goal 140-180)  - Continue Detemir 10 units nightly and Aspart 5 units with meals  - Continue LEANN     High blood pressure  - BP elevated 151/103 in the ED  - Patient was anxious due to recently family circumstances  - Continue PRN labetalol and hydralazine  - Will continue to monitor    CODE STATUS: Full Code  Access: Peripheral  Antibiotics: Vancomycin, Cefepime, Flagyl (Day 5)  Diet: Diabetic  DVT Prophylaxis: Lovenox  GI Prophylaxis: none needed  Fluids:  None    Disposition:  Continue IV antibiotics.  Discontinued IV fluids.  Will DC IV fluids once clear margins are determined by surgery.  Likely to go home tomorrow and patient to follow-up with Wound Care Clinic outpatient.    Sherly Lawrence MD  [facility] Internal Medicine, HO-2

## 2022-10-06 NOTE — PT/OT/SLP EVAL
Physical Therapy Evaluation    Patient Name:  Maribel Araiza   MRN:  [id number]    Recommendations:     Discharge Recommendations:  home health PT   Discharge Equipment Recommendations: walker, rolling, bath bench   Barriers to discharge: Decreased caregiver support    Assessment:     Maribel Araiza is a 57 y.o. female admitted with a medical diagnosis of   1. Diabetic infection of right foot    2. Cellulitis    3. Diabetes mellitus, new onset    4. DVT (deep venous thrombosis)    5. Osteomyelitis    6. Osteomyelitis of toe    7. Cellulitis    Per MD note  Pt has history of gestational diabetes who presented to [facility] ED on 10/2/2022  with complaint of right foot 3rd toe wound. Patient reports that she first noticed the toe wound yesterday night. She did not notice the wound the night prior but does note having a foot sore in the same area a couple weeks ago. She normally has foot sores in that area due to her arched feet that scrapes against her shoes. She says the sores come and go but have never gotten this bad. Of note, patient recently has had a lot of stressful family events.    She presents with the following impairments/functional limitations:  impaired endurance, impaired self care skills, impaired functional mobility, gait instability, impaired balance, orthopedic precautions. She is motivated to participate. She has limited assist due to her son and DIL working and her  currently in rehab following CVA.    Rehab Prognosis: Good; patient would benefit from acute skilled PT services to address these deficits and reach maximum level of function.    Recent Surgery: Procedure(s) (LRB):  AMPUTATION, right third toe (Right) 1 Day Post-Op    Plan:     During this hospitalization, patient to be seen  (3-5 x week) to address the identified rehab impairments via gait training, therapeutic exercises, therapeutic activities and progress toward the following goals:    Plan of Care Expires:  11/05/22    Subjective      Chief Complaint: none  Patient/Family Comments/goals: none stated  Pain/Comfort:  Pain Rating 1: 0/10     Pre session vitals Post session vitals   BP mmHG 159/81 144/81   HR bpm 84 84   Ox sats % 96 96       Patients cultural, spiritual, Scientologist conflicts given the current situation: no    Living Environment:  Pt lives with  (who is currently in physical rehab facility) and son who has TBI in SS with no ESTEPHANIE. She has walk in and tub shower combo. She was (I), working and driving prior to Admit  Equipment used at home: none.  DME owned (not currently used): none.  Upon discharge, patient will have assistance from [facility] but only for a short period of time.    Objective:     Communicated with nurse poly prior to session.  Patient found supine with peripheral IV  upon PT entry to room. Wound care nurse present    General Precautions: Standard,     Orthopedic Precautions:RLE non weight bearing   Braces: N/A  Respiratory Status: Room air    Exams:  Cognitive Exam:  Patient is oriented to Person, Place, Time, and Situation  Sensation:    -       Intact  light/touch BUE; impaired LT RLE; more sensitive on L foot  Skin Integrity/Edema:      -       Skin integrity: wound and bandage R foot  RUE ROM: WFL  RUE Strength: WFL  LUE ROM: WFL  LUE Strength: WFL  RLE ROM: WFL  RLE Strength: WFL  LLE ROM: WFL    Functional Mobility:  Bed Mobility:     Supine to Sit: stand by assistance  Sit to Supine: stand by assistance  Transfers:     Sit to Stand:  minimum assistance with rolling walker NWB RLE. Vc for UE placement  Gait: Patient ambulated 35ft with Rolling Walker and minimal assistance using swing to. Patient demonstrated decreased eric, decreased step length, and guarded posture during gait due to impaired balance and orthopedic limitation .  PT donns/ doffs DARCO shoe      Balance:   Static Sit: FAIR: Maintains without assist, but unable to take any challenges   Dynamic Sit: FAIR+: Maintains balance through  MINIMAL excursions of active trunk motion  Static Stand: POOR+: Needs MINIMAL assist to maintain  Dynamic stand: POOR+: Needs MIN (minimal ) assist during gait       Therapeutic Activities and Exercises:   PT provided extensive ed on home safety, NWB status, recommended DME, DAR[employer] shoe, mobility in hospital room w/ assist, POC, positioning and safety. Pt expressed understanding      Patient left supine with all lines intact, nurse notified, son present, and RLE elevated .    GOALS:   Multidisciplinary Problems       Physical Therapy Goals          Problem: Physical Therapy    Goal Priority Disciplines Outcome Goal Variances Interventions   Physical Therapy Goal     PT, PT/OT      Description: Goals to be met by: discharge     Patient will increase functional independence with mobility by performin. Supine to sit with Modified [place]  2. Sit to supine with Modified [place]  3. Sit to stand transfer with Stand-by Assistance w/ RW  4. Bed to chair transfer with Stand-by Assistance using Rolling Walker  5. Gait  x 75 feet with Stand-by Assistance using Rolling Walker.                          History:     History reviewed. No pertinent past medical history.    Past Surgical History:   Procedure Laterality Date    TOE AMPUTATION Right 10/5/2022    Procedure: AMPUTATION, right third toe;  Surgeon: Glen Roth MD;  Location: [facility];  Service: General;  Laterality: Right;       Time Tracking:     PT Received On: 10/06/22  PT Start Time: 852     PT Stop Time: 935  PT Total Time (min): 43 min     Billable Minutes: Evaluation 43      10/06/2022

## 2022-10-06 NOTE — PLAN OF CARE
[facility] General Surgery Plan of Care Update    Maribel Araiza  [id number]  10/06/2022    Patient connected with PT and wound care, right 3rd toe amputation tolerated without complication. Surgery to sign off at this time, please call with questions or concerns. Recommend dispo with oral antibiotics per medicine selection.    Kevin Carnes MD  [facility] General Surgery PGY-1

## 2022-10-06 NOTE — PROGRESS NOTES
[facility] General Surgery Progress Note    Maribel Araiza   [id number]     Subjective  POD 1 right 3rd toe amputation with metatarsal head margin. No acute events overnight, VSS, Tmax 98.5. Denies severe pain this morning, f/c, n/v.    Objective  Temp:  [96.8 °F (36 °C)-98.5 °F (36.9 °C)] 98.2 °F (36.8 °C)  Pulse:  [53-79] 63  Resp:  [16-20] 18  SpO2:  [96 %-100 %] 96 %  BP: (126-171)/() 133/75    No intake/output data recorded.  I/O last 3 completed shifts:  In: 2040 [P.O.:240; I.V.:1400; IV Piggyback:400]  Out: 1900 [Urine:1900]    Physical Exam  Gen: NAD, AAOx3  CV: extremities wwp, regular rate, palpable radials  Pulm: nonlabored, no increased wob, equal chest rise b/l   Abd: s/nt/nd   Wounds: Dressing remains in place since post-op, will rewrap and examine with wound care at bedside today    Labs:      Recent Labs   Lab 10/05/22  0409 10/06/22  0355   WBC 4.9 5.1   HGB 12.4 12.9   HCT 37.1 39.2    310    141   CHLORIDE 108* 108*   CO2 25 24   BUN 7.4* 4.4*   CREATININE 0.58 0.53*   GLUCOSE 129* 87   CALCIUM 8.7 8.9   MG 2.10 1.90   PHOS 3.8 3.4   ALKPHOS 80 75     Micro:   Tissue culture anaerobic and gram stain pending  Tissue culture aerobic no growth at 24 hrs    A/P:    Maribel Araiza is a 57 y.o. female with a PMH of gestational diabetes and recently diagnosed DM2 noted during this visit who presents with a right 3rd toe wound with surrounding cellulitis.    - Tolerated OR well, no issues or complications  - Wound care onboard  - Abx per primary, wound cultures pending    Kevin Carnes MD  [facility] General Surgery, PGY-1

## 2022-10-07 VITALS
OXYGEN SATURATION: 100 % | HEIGHT: 68 IN | SYSTOLIC BLOOD PRESSURE: 160 MMHG | BODY MASS INDEX: 27.9 KG/M2 | TEMPERATURE: 98 F | WEIGHT: 184.06 LBS | RESPIRATION RATE: 18 BRPM | HEART RATE: 72 BPM | DIASTOLIC BLOOD PRESSURE: 89 MMHG

## 2022-10-07 PROBLEM — M86.9 OSTEOMYELITIS: Status: ACTIVE | Noted: 2022-10-07

## 2022-10-07 LAB
ALBUMIN SERPL-MCNC: 2.6 GM/DL (ref 3.5–5)
ALBUMIN/GLOB SERPL: 0.9 RATIO (ref 1.1–2)
ALP SERPL-CCNC: 79 UNIT/L (ref 40–150)
ALT SERPL-CCNC: 39 UNIT/L (ref 0–55)
AST SERPL-CCNC: 52 UNIT/L (ref 5–34)
BACTERIA BLD CULT: NORMAL
BACTERIA BLD CULT: NORMAL
BASOPHILS # BLD AUTO: 0.04 X10(3)/MCL (ref 0–0.2)
BASOPHILS NFR BLD AUTO: 0.8 %
BILIRUBIN DIRECT+TOT PNL SERPL-MCNC: 0.2 MG/DL
BUN SERPL-MCNC: 6.8 MG/DL (ref 9.8–20.1)
CALCIUM SERPL-MCNC: 8.8 MG/DL (ref 8.4–10.2)
CHLORIDE SERPL-SCNC: 107 MMOL/L (ref 98–107)
CO2 SERPL-SCNC: 25 MMOL/L (ref 22–29)
CREAT SERPL-MCNC: 0.58 MG/DL (ref 0.55–1.02)
EOSINOPHIL # BLD AUTO: 0.14 X10(3)/MCL (ref 0–0.9)
EOSINOPHIL NFR BLD AUTO: 2.6 %
ERYTHROCYTE [DISTWIDTH] IN BLOOD BY AUTOMATED COUNT: 11.8 % (ref 11.5–17)
GFR SERPLBLD CREATININE-BSD FMLA CKD-EPI: >60 MLS/MIN/1.73/M2
GLOBULIN SER-MCNC: 3 GM/DL (ref 2.4–3.5)
GLUCOSE SERPL-MCNC: 92 MG/DL (ref 74–100)
HCT VFR BLD AUTO: 37.3 % (ref 37–47)
HGB BLD-MCNC: 12.9 GM/DL (ref 12–16)
IMM GRANULOCYTES # BLD AUTO: 0.03 X10(3)/MCL (ref 0–0.04)
IMM GRANULOCYTES NFR BLD AUTO: 0.6 %
LYMPHOCYTES # BLD AUTO: 2.02 X10(3)/MCL (ref 0.6–4.6)
LYMPHOCYTES NFR BLD AUTO: 38.1 %
MAGNESIUM SERPL-MCNC: 2.3 MG/DL (ref 1.6–2.6)
MCH RBC QN AUTO: 30.1 PG (ref 27–31)
MCHC RBC AUTO-ENTMCNC: 34.6 MG/DL (ref 33–36)
MCV RBC AUTO: 87.1 FL (ref 80–94)
MONOCYTES # BLD AUTO: 0.64 X10(3)/MCL (ref 0.1–1.3)
MONOCYTES NFR BLD AUTO: 12.1 %
NEUTROPHILS # BLD AUTO: 2.4 X10(3)/MCL (ref 2.1–9.2)
NEUTROPHILS NFR BLD AUTO: 45.8 %
NRBC BLD AUTO-RTO: 0 %
PHOSPHATE SERPL-MCNC: 3.4 MG/DL (ref 2.3–4.7)
PLATELET # BLD AUTO: 315 X10(3)/MCL (ref 130–400)
PMV BLD AUTO: 9.9 FL (ref 7.4–10.4)
POCT GLUCOSE: 110 MG/DL (ref 70–110)
POCT GLUCOSE: 139 MG/DL (ref 70–110)
POCT GLUCOSE: 181 MG/DL (ref 70–110)
POTASSIUM SERPL-SCNC: 3.9 MMOL/L (ref 3.5–5.1)
PROT SERPL-MCNC: 5.6 GM/DL (ref 6.4–8.3)
RBC # BLD AUTO: 4.28 X10(6)/MCL (ref 4.2–5.4)
SODIUM SERPL-SCNC: 141 MMOL/L (ref 136–145)
VANCOMYCIN TROUGH SERPL-MCNC: 12.5 UG/ML (ref 15–20)
WBC # SPEC AUTO: 5.3 X10(3)/MCL (ref 4.5–11.5)

## 2022-10-07 PROCEDURE — 25000003 PHARM REV CODE 250: Performed by: NURSE PRACTITIONER

## 2022-10-07 PROCEDURE — S0030 INJECTION, METRONIDAZOLE: HCPCS

## 2022-10-07 PROCEDURE — 63600175 PHARM REV CODE 636 W HCPCS: Performed by: INTERNAL MEDICINE

## 2022-10-07 PROCEDURE — 36415 COLL VENOUS BLD VENIPUNCTURE: CPT

## 2022-10-07 PROCEDURE — 80053 COMPREHEN METABOLIC PANEL: CPT

## 2022-10-07 PROCEDURE — 63600175 PHARM REV CODE 636 W HCPCS

## 2022-10-07 PROCEDURE — 25000003 PHARM REV CODE 250

## 2022-10-07 PROCEDURE — 85025 COMPLETE CBC W/AUTO DIFF WBC: CPT

## 2022-10-07 PROCEDURE — 83735 ASSAY OF MAGNESIUM: CPT

## 2022-10-07 PROCEDURE — 97116 GAIT TRAINING THERAPY: CPT

## 2022-10-07 PROCEDURE — 84100 ASSAY OF PHOSPHORUS: CPT

## 2022-10-07 PROCEDURE — 80202 ASSAY OF VANCOMYCIN: CPT | Performed by: INTERNAL MEDICINE

## 2022-10-07 RX ORDER — DEXTROSE 4 G
TABLET,CHEWABLE ORAL
Qty: 1 EACH | Refills: 1 | Status: SHIPPED | OUTPATIENT
Start: 2022-10-07

## 2022-10-07 RX ORDER — SULFAMETHOXAZOLE AND TRIMETHOPRIM 800; 160 MG/1; MG/1
2 TABLET ORAL 2 TIMES DAILY
Qty: 16 TABLET | Refills: 0 | Status: SHIPPED | OUTPATIENT
Start: 2022-10-07 | End: 2022-10-07 | Stop reason: SDUPTHER

## 2022-10-07 RX ORDER — DEXTROSE 4 G
TABLET,CHEWABLE ORAL
Qty: 1 EACH | Refills: 1 | Status: SHIPPED | OUTPATIENT
Start: 2022-10-07 | End: 2022-10-07 | Stop reason: SDUPTHER

## 2022-10-07 RX ORDER — LOSARTAN POTASSIUM 50 MG/1
50 TABLET ORAL DAILY
Qty: 90 TABLET | Refills: 3 | Status: SHIPPED | OUTPATIENT
Start: 2022-10-08 | End: 2023-05-15 | Stop reason: SDUPTHER

## 2022-10-07 RX ORDER — VANCOMYCIN 1.75 GRAM/500 ML IN 0.9 % SODIUM CHLORIDE INTRAVENOUS
1750
Status: DISCONTINUED | OUTPATIENT
Start: 2022-10-07 | End: 2022-10-07 | Stop reason: HOSPADM

## 2022-10-07 RX ORDER — SULFAMETHOXAZOLE AND TRIMETHOPRIM 800; 160 MG/1; MG/1
2 TABLET ORAL 2 TIMES DAILY
Qty: 16 TABLET | Refills: 0 | Status: SHIPPED | OUTPATIENT
Start: 2022-10-07 | End: 2022-10-19

## 2022-10-07 RX ORDER — LOSARTAN POTASSIUM 50 MG/1
50 TABLET ORAL DAILY
Qty: 90 TABLET | Refills: 3 | Status: SHIPPED | OUTPATIENT
Start: 2022-10-08 | End: 2022-10-07 | Stop reason: SDUPTHER

## 2022-10-07 RX ORDER — LANCETS
EACH MISCELLANEOUS
Qty: 100 EACH | Refills: 11 | Status: SHIPPED | OUTPATIENT
Start: 2022-10-07

## 2022-10-07 RX ORDER — LANCETS
EACH MISCELLANEOUS
Qty: 100 EACH | Refills: 11 | Status: SHIPPED | OUTPATIENT
Start: 2022-10-07 | End: 2022-10-07 | Stop reason: SDUPTHER

## 2022-10-07 RX ORDER — METFORMIN HYDROCHLORIDE 500 MG/1
1000 TABLET, EXTENDED RELEASE ORAL
Qty: 180 TABLET | Refills: 1 | Status: SHIPPED | OUTPATIENT
Start: 2022-10-07 | End: 2022-10-19

## 2022-10-07 RX ORDER — METFORMIN HYDROCHLORIDE 500 MG/1
1000 TABLET, EXTENDED RELEASE ORAL
Qty: 180 TABLET | Refills: 1 | Status: SHIPPED | OUTPATIENT
Start: 2022-10-07 | End: 2022-10-07 | Stop reason: SDUPTHER

## 2022-10-07 RX ADMIN — CEFEPIME 2 G: 2 INJECTION, POWDER, FOR SOLUTION INTRAVENOUS at 05:10

## 2022-10-07 RX ADMIN — INSULIN ASPART 5 UNITS: 100 INJECTION, SOLUTION INTRAVENOUS; SUBCUTANEOUS at 08:10

## 2022-10-07 RX ADMIN — INSULIN ASPART 5 UNITS: 100 INJECTION, SOLUTION INTRAVENOUS; SUBCUTANEOUS at 12:10

## 2022-10-07 RX ADMIN — METRONIDAZOLE 500 MG: 5 INJECTION, SOLUTION INTRAVENOUS at 08:10

## 2022-10-07 RX ADMIN — VANCOMYCIN 1.75 GRAM/500 ML IN 0.9 % SODIUM CHLORIDE INTRAVENOUS 1750 MG: at 05:10

## 2022-10-07 RX ADMIN — LOSARTAN POTASSIUM 50 MG: 25 TABLET, FILM COATED ORAL at 08:10

## 2022-10-07 RX ADMIN — METRONIDAZOLE 500 MG: 5 INJECTION, SOLUTION INTRAVENOUS at 12:10

## 2022-10-07 NOTE — PLAN OF CARE
Problem: Adult Inpatient Plan of Care  Goal: Plan of Care Review  10/7/2022 1558 by Jami Bryan RN  Outcome: Met  10/7/2022 1154 by Jami Bryan RN  Outcome: Ongoing, Progressing  Goal: Patient-Specific Goal (Individualized)  10/7/2022 1558 by Jami Bryan RN  Outcome: Met  10/7/2022 1154 by Jami Bryan RN  Outcome: Ongoing, Progressing  Goal: Absence of Hospital-Acquired Illness or Injury  10/7/2022 1558 by Jami Bryan RN  Outcome: Met  10/7/2022 1154 by Jami Bryan RN  Outcome: Ongoing, Progressing  Goal: Optimal Comfort and Wellbeing  10/7/2022 1558 by Jami Bryan RN  Outcome: Met  10/7/2022 1154 by Jami Bryan RN  Outcome: Ongoing, Progressing  Goal: Readiness for Transition of Care  10/7/2022 1558 by Jami Bryan RN  Outcome: Met  10/7/2022 1154 by Jami Bryan RN  Outcome: Ongoing, Progressing     Problem: Impaired Wound Healing  Goal: Optimal Wound Healing  10/7/2022 1558 by Jami Bryan RN  Outcome: Met  10/7/2022 1154 by Jami Bryan RN  Outcome: Ongoing, Progressing     Problem: Pain Acute  Goal: Acceptable Pain Control and Functional Ability  10/7/2022 1558 by Jami Bryan RN  Outcome: Met  10/7/2022 1154 by Jami Bryan RN  Outcome: Ongoing, Progressing     Problem: Diabetes Comorbidity  Goal: Blood Glucose Level Within Targeted Range  10/7/2022 1558 by Jami Bryan RN  Outcome: Met  10/7/2022 1154 by Jami Bryan RN  Outcome: Ongoing, Progressing     Problem: Fall Injury Risk  Goal: Absence of Fall and Fall-Related Injury  10/7/2022 1558 by Jami Bryan RN  Outcome: Met  10/7/2022 1154 by Jami Bryan RN  Outcome: Ongoing, Progressing

## 2022-10-07 NOTE — DISCHARGE SUMMARY
[facility] Internal Medicine Discharge Summary    Admitting Physician: Uriel Ibarra MD  Attending Physician: Deo Brunner MD  Date of Admit: 10/2/2022  Date of Discharge: 10/7/2022    Condition: Good  Outcome: Patient tolerated treatment/procedure well without complication and is now ready for discharge.  [place] [facility]          [place]charge Diagnoses       Principal Problem: Osteomyelitis s/p right 3rd digit amputation    Consultants and Procedures     Consultants:  PHARMACY TO DOSE VANCOMYCIN CONSULT  IP CONSULT TO INTERNAL MEDICINE  PHARMACY TO DOSE VANCOMYCIN CONSULT  IP CONSULT TO GENERAL SURGERY  IP CONSULT TO REGISTERED DIETITIAN/NUTRITIONIST  WOUND CARE CONSULT  IP CONSULT TO SOCIAL WORK/CASE MANAGEMENT  IP CONSULT TO SOCIAL WORK/CASE MANAGEMENT    Procedures:   Procedure(s) (LRB):  AMPUTATION, right third toe (Right)     Brief Admission History      Maribel Araiza is a 57 y.o. female with a history of gestational diabetes who presented to [facility] ED on 10/2/2022  with complaint of right foot 3rd toe wound. Patient reports that she first noticed the toe wound yesterday night. She did not notice the wound the night prior but does not having a foot sore in the same area a couple weeks ago. She normally has foot sores in that area due to her arched feet that scrapes against her shoes. She says the sores come and go but have never gotten this bad. She denies any pain in her toes but does complain of pain in her right calf. She says that she also get some swelling in her feet when she goes for a long drive. Of note, patient recently returned from [place] for her father's  which was a 15 hr drive this past . Patient was in emotional distress during the interview due to other family issues as well. Patient reports no past medical history except gestational diabetes during her pregnancies. She has not seen a physician in years. She says she does not smoke, only drinks alcohol socially, and  denies any other drug use. She denies having any fevers, chills, SOB, chest pain, abdominal pain, dysuria, constipation or diarrhea.     Hospital Course with Pertinent Findings     During patient's admission, she was initially septic with SIRS 2/4 tachycardic 108, tachypneic 20, and WBC 8. She was started on IV fluids in the ED. She was then admitted and started on Vancomycin, Cefepime, and Flagyl. She initially had a right foot x-ray which did not show any osseus abnormalities. CT of her right foot showed concern for osteomyelitis in her 3rd proximal phalanx. MRI of her right foot the next day showed osteomyelitis of the proxmial, middle and distal phalanges of the 3rd digit. Surgery was consulted early in the hospital course and took the patient to surgery for amputation of her right foot 3rd digit. Patient tolerated the procedure well. During her stay, blood cultures have remained NGTD and tissue cultures have also remained NGTD after 24hrs. Of note, patient was diagnosed with new onset diabetes on this admission with elevated fasting glucose and elevated A1c on admission. She was started on insulin regimen while she was inpatient and tolerated the treatment well. Since her stay here, her glucose has been pretty well controlled. Plan to discharge patient with home basal insulin 10 units nightly along with Metformin ER 1000 mg. Also, patient was found to be hypertensive on admission and started on Losartan 50 mg daily. She tolerated the medication well and her blood pressures have been well controlled. Patient is currently stable at this time and ready for discharge. Patient will have follow-up with wound care outpatient and will start patient on 4 day course of Bactrim to finish 10 day course total of antibiotics. Patient to follow-up with Dr. Maxwell outpatient in family medicine clinic per patient's request.    Discharge physical exam:  Vitals  BP: (!) 160/89  Temp: 98.2 °F (36.8 °C)  Temp src: Oral  Pulse:  "72  Resp: 18  SpO2: 100 %  Height: 5' 8" (172.7 cm)  Weight: 83.5 kg (184 lb 1.4 oz)    Physical Exam  Constitutional:       Appearance: Normal appearance.   HENT:      Head: Normocephalic and atraumatic.      Mouth/Throat:      Mouth: Mucous membranes are moist.   Eyes:      Extraocular Movements: Extraocular movements intact.      Conjunctiva/sclera: Conjunctivae normal.      Pupils: Pupils are equal, round, and reactive to light.   Cardiovascular:      Rate and Rhythm: Normal rate and regular rhythm.      Pulses: Normal pulses.      Heart sounds: No murmur heard.  Pulmonary:      Effort: Pulmonary effort is normal. No respiratory distress.   Chest:      Chest wall: No tenderness.   Abdominal:      General: Abdomen is flat. Bowel sounds are normal. There is no distension.      Palpations: Abdomen is soft.      Tenderness: There is no abdominal tenderness.   Musculoskeletal:         General: Normal range of motion.      Cervical back: Normal range of motion.      Comments: Right foot bandaged clean and intact; no erythema noted   Skin:     General: Skin is warm.      Capillary Refill: Capillary refill takes less than 2 seconds.   Neurological:      General: No focal deficit present.      Mental Status: She is alert and oriented to person, place, and time.        TIME SPENT ON DISCHARGE: 60 minutes    Discharge Medications        Medication List        START taking these medications      blood sugar diagnostic Strp  Check blood glucose in the morning and before each meal.     blood-glucose meter Misc  Check blood glucose in the morning and before each meal.     insulin detemir U-100 100 unit/mL injection  Commonly known as: Levemir  Inject 10 Units into the skin every evening.     lancets Misc  Commonly known as: ACCU-CHEK MULTICLIX LANCET  Check blood glucose in the morning and before each meal.     losartan 50 MG tablet  Commonly known as: COZAAR  Take 1 tablet (50 mg total) by mouth once daily.  Start taking on: " October 8, 2022     metFORMIN 500 MG ER 24hr tablet  Commonly known as: GLUCOPHAGE-XR  Take 2 tablets (1,000 mg total) by mouth daily with breakfast.     sulfamethoxazole-trimethoprim 800-160mg 800-160 mg Tab  Commonly known as: BACTRIM DS  Take 2 tablets by mouth 2 (two) times daily.               Where to Get Your Medications        These medications were sent to [address] [address]      Phone: [phone]   blood sugar diagnostic Strp  blood-glucose meter Misc  insulin detemir U-100 100 unit/mL injection  lancets Misc  losartan 50 MG tablet  metFORMIN 500 MG ER 24hr tablet  sulfamethoxazole-trimethoprim 800-160mg 800-160 mg Tab         Discharge Information:     Plan to discharge patient home with home health. Starting patient on basal insulin 10 units nightly along with Metformin ER 1000 mg. Also plan to prescribe 4 day course of Bactrim to complete antibiotics. Patient has follow-up with wound care. Plan to have patient follow-up with Dr. Maxwell in Family medicine clinic outpatient.    Dayton Anton MD  [facility], -

## 2022-10-07 NOTE — CONSULTS
HH consult. Spoke with pt and she is in agreement with [facility]. Sent referral via [facility].     [facility] is sending to insurance for authorization. They have accepted.    Sent DC summary to [facility] and notified them that pt will be F/U in wound care clinic.

## 2022-10-07 NOTE — PROGRESS NOTES
Pharmacokinetic Assessment Follow Up: IV Vancomycin    Vancomycin serum concentration assessment(s):    The trough level was drawn correctly and can be used to guide therapy at this time. The measurement is below the desired definitive target range of 15 to 20 mcg/mL.    Vancomycin Regimen Plan:    Change regimen to Vancomycin 1750 mg IV every 12 hours with next serum trough concentration measured at 0500 prior to 3rd dose on 10/8    Drug levels (last 3 results):  Recent Labs   Lab Result Units 10/04/22  1609 10/05/22  1514 10/07/22  0311   Vanc Lvl Random ug/ml  --  15.6  --    Vancomycin Trough ug/ml 10.4*  --  12.5*       Pharmacy will continue to follow and monitor vancomycin.    Please contact pharmacy at extension [phone] for questions regarding this assessment.    Thank you for the consult,   Félix Frost       Patient brief summary:  Maribel Araiza is a 57 y.o. female initiated on antimicrobial therapy with IV Vancomycin for treatment of bone/joint infection    The patient's current regimen is vanco 1500mg q12hr    Drug Allergies:   Review of patient's allergies indicates:  No Known Allergies    Actual Body Weight:   83.5kg    Renal Function:   Estimated Creatinine Clearance: 122.6 mL/min (based on SCr of 0.58 mg/dL).,     Dialysis Method (if applicable):  N/A    CBC (last 72 hours):  Recent Labs   Lab Result Units 10/05/22  0409 10/06/22  0355 10/07/22  0311   WBC x10(3)/mcL 4.9 5.1 5.3   Hgb gm/dL 12.4 12.9 12.9   Hct % 37.1 39.2 37.3   Platelet x10(3)/mcL 317 310 315   Mono % % 9.8 10.8 12.1   Eos % % 3.0 2.7 2.6   Basophil % % 0.8 0.4 0.8       Metabolic Panel (last 72 hours):  Recent Labs   Lab Result Units 10/05/22  0409 10/06/22  0355 10/07/22  0311   Sodium Level mmol/L 141 141 141   Potassium Level mmol/L 3.9 3.5 3.9   Chloride mmol/L 108* 108* 107   Carbon Dioxide mmol/L 25 24 25   Glucose Level mg/dL 129* 87 92   Blood Urea Nitrogen mg/dL 7.4* 4.4* 6.8*   Creatinine mg/dL 0.58 0.53* 0.58   Albumin  Level gm/dL 2.6* 2.6* 2.6*   Bilirubin Total mg/dL 0.2 0.2 0.2   Alkaline Phosphatase unit/L 80 75 79   Aspartate Aminotransferase unit/L 14 40* 52*   Alanine Aminotransferase unit/L 11 28 39   Magnesium Level mg/dL 2.10 1.90 2.30   Phosphorus Level mg/dL 3.8 3.4 3.4       Vancomycin Administrations:  vancomycin given in the last 96 hours                     vancomycin (VANCOCIN) 1,500 mg in sodium chloride 0.9% 250 mL IVPB (mg) 1,500 mg New Bag 10/06/22 1733     1,500 mg New Bag  0400     1,500 mg New Bag 10/05/22 1800     1,500 mg New Bag  0415     1,500 mg New Bag 10/04/22 1808     1,500 mg New Bag  0356     1,500 mg New Bag 10/03/22 1701                    Microbiologic Results:  Microbiology Results (last 7 days)       Procedure Component Value Units Date/Time    Blood Culture #1 [[id number]]  (Normal) Collected: 10/02/22 1325    Order Status: Completed Specimen: Blood Updated: 10/06/22 2101     CULTURE, BLOOD ([facility]) No Growth At 96 Hours    Blood Culture #2 [[id number]]  (Normal) Collected: 10/02/22 1325    Order Status: Completed Specimen: Blood Updated: 10/06/22 2101     CULTURE, BLOOD ([facility]) No Growth At 96 Hours    Gram Stain [[id number]] Collected: 10/05/22 1213    Order Status: Completed Specimen: Tissue from Toe, Right Foot Updated: 10/06/22 0813     GRAM STAIN No WBCs, No bacteria seen    Tissue Culture - Aerobic [[id number]] Collected: 10/05/22 1213    Order Status: Completed Specimen: Tissue from Toe, Right Foot Updated: 10/06/22 0655     CULTURE, TISSUE- AEROBIC ([facility]) No Growth At 24 Hours    Anaerobic Culture [[id number]] Collected: 10/05/22 1213    Order Status: Sent Specimen: Tissue from Toe, Right Foot Updated: 10/05/22 1242

## 2022-10-07 NOTE — PLAN OF CARE
Problem: Adult Inpatient Plan of Care  Goal: Plan of Care Review  Outcome: Ongoing, Progressing  Goal: Patient-Specific Goal (Individualized)  Outcome: Ongoing, Progressing  Goal: Absence of Hospital-Acquired Illness or Injury  Outcome: Ongoing, Progressing  Goal: Optimal Comfort and Wellbeing  Outcome: Ongoing, Progressing  Goal: Readiness for Transition of Care  Outcome: Ongoing, Progressing     Problem: Impaired Wound Healing  Goal: Optimal Wound Healing  Outcome: Ongoing, Progressing     Problem: Pain Acute  Goal: Acceptable Pain Control and Functional Ability  Outcome: Ongoing, Progressing     Problem: Diabetes Comorbidity  Goal: Blood Glucose Level Within Targeted Range  Outcome: Ongoing, Progressing     Problem: Fall Injury Risk  Goal: Absence of Fall and Fall-Related Injury  Outcome: Ongoing, Progressing

## 2022-10-07 NOTE — CONSULTS
DME consult. Requested that Dr Grajeda enter into Epic the DME orders. Will send to Dania when they are entered. Requested specified: RW and raised toilet seat with handles.    Filled out [facility] order for RW. Awaiting order for raised toilet seat with handles.    Obtained raised toilet seat order. Faxed to Raoul @ [phone].

## 2022-10-07 NOTE — PT/OT/SLP PROGRESS
Physical Therapy Treatment    Patient Name:  Maribel Araiza   MRN:  [id number]    Recommendations:     Discharge Recommendations:  home health PT   Discharge Equipment Recommendations: walker, rolling, bath bench, other (see comments) (raised toilet seat with handles)   Barriers to discharge: None (MIL assist for the next couple weeks only)    Assessment:     Maribel Araiza is a 57 y.o. female admitted with a medical diagnosis of   1. Diabetic infection of right foot    2. Cellulitis    3. Diabetes mellitus, new onset    4. DVT (deep venous thrombosis)    5. Osteomyelitis    6. Osteomyelitis of toe    7. Cellulitis    She presents with the following impairments/functional limitations:  weakness, impaired endurance, impaired functional mobility, gait instability, impaired balance, orthopedic precautions .    Rehab Prognosis: Good; patient would benefit from acute skilled PT services to address these deficits and reach maximum level of function.    Recent Surgery: Procedure(s) (LRB):  AMPUTATION, right third toe (Right) 2 Days Post-Op    Plan:     During this hospitalization, patient to be seen  (3-5 x week) to address the identified rehab impairments via gait training, therapeutic activities, therapeutic exercises and progress toward the following goals:    Plan of Care Expires:  11/05/22    Subjective     Chief Complaint: none  Patient/Family Comments/goals: to go home  Pain/Comfort:  Pain Rating 1: 0/10      Objective:     Communicated with nurse prior to session.  Patient found supine with peripheral IV upon PT entry to room.     General Precautions: Standard,     Orthopedic Precautions:RLE non weight bearing   Braces: N/A  Respiratory Status: Room air     Functional Mobility:  Bed Mobility:     Supine to Sit: modified independence  Transfers:     Sit to Stand:  contact guard assistance with rolling walker. Vc for UE placement  Gait: pt ambulated 60 ft w/ RW and CGA with 1 standing rest break. Pt able to maintain NWB  status RLE. Pt does demonstrate fatigue with distance. No LOB or mis steps  PT donns / doffs DARCO shoe          Therapeutic Activities and Exercises:   PT provided ed on safety, shoewear, and DME.    Patient left  partial long sitting EOB  with all lines intact, call button in reach, and nurse notified.. SW notified of recommended DME and HHPT services    GOALS:   Multidisciplinary Problems       Physical Therapy Goals          Problem: Physical Therapy    Goal Priority Disciplines Outcome Goal Variances Interventions   Physical Therapy Goal     PT, PT/OT Ongoing, Progressing     Description: Goals to be met by: discharge     Patient will increase functional independence with mobility by performin. Supine to sit with Modified [place]-met  2. Sit to supine with Modified [place]  3. Sit to stand transfer with Stand-by Assistance w/ RW  4. Bed to chair transfer with Stand-by Assistance using Rolling Walker  5. Gait  x 75 feet with Stand-by Assistance using Rolling Walker.                          Time Tracking:     PT Received On: 10/07/22  PT Start Time: 0832     PT Stop Time: 0853  PT Total Time (min): 21 min     Billable Minutes: Gait Training 21    Treatment Type: Treatment  PT/PTA: PT           10/07/2022

## 2022-10-08 LAB — BACTERIA SPEC ANAEROBE CULT: NORMAL

## 2022-10-10 ENCOUNTER — HOSPITAL ENCOUNTER (OUTPATIENT)
Dept: WOUND CARE | Facility: HOSPITAL | Age: 57
Discharge: HOME | End: 2022-10-10
Attending: NURSE PRACTITIONER
Payer: COMMERCIAL

## 2022-10-10 VITALS — HEART RATE: 66 BPM | SYSTOLIC BLOOD PRESSURE: 125 MMHG | DIASTOLIC BLOOD PRESSURE: 69 MMHG | RESPIRATION RATE: 20 BRPM

## 2022-10-10 DIAGNOSIS — Z87.39 HISTORY OF OSTEOMYELITIS: ICD-10-CM

## 2022-10-10 DIAGNOSIS — M20.42 ACQUIRED HAMMERTOES OF BOTH FEET: ICD-10-CM

## 2022-10-10 DIAGNOSIS — E11.628 TYPE 2 DIABETES MELLITUS WITH OTHER SKIN COMPLICATION, WITHOUT LONG-TERM CURRENT USE OF INSULIN: ICD-10-CM

## 2022-10-10 DIAGNOSIS — Z89.421 STATUS POST AMPUTATION OF LESSER TOE OF RIGHT FOOT: ICD-10-CM

## 2022-10-10 DIAGNOSIS — S91.104A OPEN WOUND OF LESSER TOE OF RIGHT FOOT: Primary | ICD-10-CM

## 2022-10-10 DIAGNOSIS — I10 PRIMARY HYPERTENSION: ICD-10-CM

## 2022-10-10 DIAGNOSIS — M20.41 ACQUIRED HAMMERTOES OF BOTH FEET: ICD-10-CM

## 2022-10-10 DIAGNOSIS — M21.961 ACQUIRED DEFORMITY OF RIGHT FOOT: ICD-10-CM

## 2022-10-10 PROBLEM — M86.9 OSTEOMYELITIS: Status: RESOLVED | Noted: 2022-10-07 | Resolved: 2022-10-10

## 2022-10-10 LAB
BACTERIA SPEC CULT: NORMAL
ESTROGEN SERPL-MCNC: NORMAL PG/ML
INSULIN SERPL-ACNC: NORMAL U[IU]/ML
LAB AP CLINICAL INFORMATION: NORMAL
LAB AP GROSS DESCRIPTION: NORMAL
LAB AP REPORT FOOTNOTES: NORMAL
T3RU NFR SERPL: NORMAL %

## 2022-10-10 PROCEDURE — 99211 OFF/OP EST MAY X REQ PHY/QHP: CPT

## 2022-10-10 PROCEDURE — 99213 OFFICE O/P EST LOW 20 MIN: CPT | Mod: ,,, | Performed by: NURSE PRACTITIONER

## 2022-10-10 PROCEDURE — 99213 PR OFFICE/OUTPT VISIT, EST, LEVL III, 20-29 MIN: ICD-10-PCS | Mod: ,,, | Performed by: NURSE PRACTITIONER

## 2022-10-10 NOTE — PROGRESS NOTES
CHIEF COMPLAINT:    Surgical wound to right foot      HISTORY OF  PRESENT ILLNESS:  57 y.o. White female being seen today for evaluation and management of surgical wound to right 3rd toe.  Hospitalized @ [facility] 10/2/2022 - 10/7/2022, where she underwent amputation of toe due to osteomyelitis; also initially septic with SIRS 2/4.  Diagnosed with Type 2 diabetes during hospitalization; she was started on insulin regimen, while inpatient.  Discharged home with basal Levemir 10 units nightly, along with Metformin ER 1000 mg.  Previously diagnosed with gestational diabetes.  Was also prescribed Losartan 50 mg daily, for newly diagnosed hypertension.  Discharged home with 4-day course of Bactrim to finish 10-day course, which was initiated inpatient.  Has been referred to Dr. Maxwell in [facility] to establish care for PCP.   Had not been followed by physician for years.  Followed by [facility].  History includes:        Past Medical History:   Diagnosis Date    Acquired deformity of right foot 10/10/2022    Acquired hammertoes of both feet 10/10/2022    History of osteomyelitis 10/10/2022    Open wound of lesser toe of right foot 10/10/2022    Primary hypertension 10/10/2022    Status post amputation of lesser toe of right foot 10/10/2022    Type 2 diabetes mellitus with skin complication, without long-term current use of insulin 10/10/2022      Past Surgical History:   Procedure Laterality Date    TOE AMPUTATION Right 10/5/2022    Procedure: AMPUTATION, right third toe;  Surgeon: Glen Roth MD;  Location: [facility];  Service: General;  Laterality: Right;      Social History     Socioeconomic History    Marital status:    Tobacco Use    Smoking status: Never    Smokeless tobacco: Never   Substance and Sexual Activity    Alcohol use: Not Currently    Drug use: Never       Review of Most Recent Wound Care-Related Labs:  Lab Results   Component Value Date/Time    WBC 5.3 10/07/2022 03:11 AM     RBC 4.28 10/07/2022 03:11 AM    HGB 12.9 10/07/2022 03:11 AM    HCT 37.3 10/07/2022 03:11 AM    MCV 87.1 10/07/2022 03:11 AM    MCH 30.1 10/07/2022 03:11 AM    CREATININE 0.58 10/07/2022 03:11 AM    HGBA1C 10.8 (H) 10/02/2022 01:18 PM    .00 (H) 10/02/2022 01:27 PM        Today 10/10/2022:  Tolerating Bactrim without side effects.  Has a couple of doses remaining.  Using knee walker for off-loading right foot.  Current wound care:  cleaning with Vashe, followed by non-contact layer dressing, followed by secondary dressing.  Being done daily.  No new complaints with wound or current wound care regimen.  Denies fevers, chills, wound redness, wound pain, wound odors, or yellow/green drainage.   Feels nervous inside after taking Metformin in morning.  Not having and GI s/e with Metformin though.        REVIEW OF SYSTEMS:  Except as stated in HPI, all other 10 body systems normal.     Current Outpatient Medications   Medication Sig Dispense Refill    blood sugar diagnostic Strp Check blood glucose in the morning and before each meal. 100 each 11    blood-glucose meter Misc Check blood glucose in the morning and before each meal. 1 each 1    insulin detemir U-100 (LEVEMIR) 100 unit/mL injection Inject 10 Units into the skin every evening. 6 mL 1    lancets (ACCU-CHEK MULTICLIX LANCET) Misc Check blood glucose in the morning and before each meal. 100 each 11    losartan (COZAAR) 50 MG tablet Take 1 tablet (50 mg total) by mouth once daily. 90 tablet 3    metFORMIN (GLUCOPHAGE-XR) 500 MG ER 24hr tablet Take 2 tablets (1,000 mg total) by mouth daily with breakfast. 180 tablet 1    sulfamethoxazole-trimethoprim 800-160mg (BACTRIM DS) 800-160 mg Tab Take 2 tablets by mouth 2 (two) times daily. 16 tablet 0     No current facility-administered medications for this encounter.         PHYSICAL EXAMINATION AND VITAL SIGNS:    Vitals:    10/10/22 0820   BP: 125/69   Pulse: 66   Resp: 20     There is no height or weight on  file to calculate BMI.      General:  VSS, afebrile.  Well-nourished, in no acute distress.  Mother-in-law with her.  Respiratory: breathing even, quiet, and unlabored.  No coughing.  Cardiovascular:   Mild non-pitting edema to right dorsal and lateral foot.  DP and PT pulses dopplered bilaterally.  No hemosiderin staining or varicosities.   Scattered hair over dorsal toes.  Evidence of shaving of legs.   Toenails overgrown, thickened, and dystrophic.   Musculoskeletal:   Right 3rd toe amputation incision with sutures.  Loss of intrinsic mucle ROM bilaterally.  Full dorsiflexion and flexion of ankles and hallux.  Significant bony deformity of right foot with widened forefoot, high arch with prominent metatarsal heads, and severe 2nd hammertoe.  Hammertoes to left 2-4th toes; however, not as severe.    Neurologic:  A&O X 3.  Cranial nerves grossly intact.   Normal monofilament testing, without loss of protective sensation.  Psychiatric:  Calm, cooperative.  Mood and effect normal.  Responses appropriate.   Integumentary:    Surgical wound to right 3rd toe space.  Sutures intact.  Red granulating tissue visible between sutures.  Wound bed flush with surrounding skin.  No erythema, purulent drainage, periwound edema, odors, induration, bogginess, or fluctuance.              Incision/Site 10/10/22 0828 Right Toe, third anterior;midline (Active)   10/10/22 0828   Present Prior to Hospital Arrival?: Yes   Side: Right   Location: Toe, third   Orientation: anterior;midline   Incision Type:    Closure Method:    Additional Comments:    Removal Indication and Assessment:    Wound Outcome: Amputation   Removal Indications:    Wound Image   10/10/22 0822   Dressing Appearance Dried drainage 10/10/22 0822   Drainage Amount Small 10/10/22 0822   Drainage Characteristics/Odor Serosanguineous 10/10/22 0822   Appearance Pink 10/10/22 0822   Wound Length (cm) 1.5 cm 10/10/22 0822   Wound Width (cm) 2 cm 10/10/22 0822   Wound Depth  "(cm) 0.1 cm 10/10/22 0822   Wound Volume (cm^3) 0.3 cm^3 10/10/22 0822   Wound Surface Area (cm^2) 3 cm^2 10/10/22 0822                 ASSESSMENT AND PLAN:    Type 2 diabetes with skin complication:  S/P right 3rd toe amputation @ [facility] on 10/5/2022 due to osteomyelitis.  Finishing up 10-day course of Bactrim, without side effects.   New dx of Type 2 diabetes, non-insulin dependent.    Having some jitteriness with Metformin in the morning; however, no GI s/e.  Communication sent to Dr. Maxwell [facility] alerting her that Ms. Araiza needs an appt scheduled.    Handout on diabetic foot care and PAD given.      Open wound right 3rd toe:  Sutures intact; wound stable.  DP and PT pulses dopplered bilaterally; no hx of smoking.  Is off-loading with a knee walker, which I encouraged her to continue for wound healing.  I called general surgery and arranged post-op f/u visit for 10/18/2022.    Wound Care Today/New Wound Care Tx Plan:  Clean wound with Vashe and pat dry, followed by plain collagen, followed by secondary dressing.  To be changed QD.  Instructed Ms. Araiza to keep foot dry until her post-op visit with surgeon.      Right foot deformity:  No reported trauma.  States "my arch fell during COVID and I wasn't going anywhere near a hospital."  Discussed role foot deformities play in development of DFU and importance of properly-fitting shoes.  She is wearing a Darco surgical shoe now; toes are cleared from friction and pressure.  Instructed on diabetic shoes and that PCP would need to order those for her.  Communication sent to Dr. Maxwell regarding foot deformity and need for diabetic shoes.  Plan:  I will provide completed Certificate of Medical Necessity and order for Dr. Maxwell's signature.  Ms. Araiza can take those forms to her appt with Dr. Maxwell; then, we can send her to [facility] for fitting.      Hammertoes:  Severe deformity of right 2nd toe.  Extreme risk of ulceration and osteomyelitis, which " I discussed with Ms. Arazia today.  No loss of sensation noted on monofilament testing today.   Has been referred to podiatrist by inpatient wound care nurse, Mili Madrigal.  Appointment pending.      Overgrown, thickened, and dystrophic toenails:  Will plan on trimming them at future appointment.              Return to clinic on 10/20/2022; she is scheduled with general surgery on 10/18/2022.  Teaching provided on s/s to call wound clinic for promptly.   Copy of today's visit note faxed to [facility].

## 2022-10-10 NOTE — PT/OT/SLP DISCHARGE
Physical Therapy Discharge Summary    Name: Maribel Araiza  MRN: [id number]   Principal Problem: Osteomyelitis     Patient Discharged from acute Physical Therapy on 10/10/2022  .  Please refer to prior PT noted date on 10/7/22 for functional status.     Assessment:     Patient was discharged to home. Information required to complete an accurate discharge summary is unknown.  Refer to therapy initial evaluation and last progress note for initial and most recent functional status and goal achievement.  Recommendations made may be found in medical record.    Objective:     GOALS:   Multidisciplinary Problems       Physical Therapy Goals          Problem: Physical Therapy    Goal Priority Disciplines Outcome Goal Variances Interventions   Physical Therapy Goal     PT, PT/OT Unable to Meet,      Description: Goals to be met by: discharge     Patient will increase functional independence with mobility by performin. Supine to sit with Modified [place]  2. Sit to supine with Modified [place]  3. Sit to stand transfer with Stand-by Assistance w/ RW  4. Bed to chair transfer with Stand-by Assistance using Rolling Walker  5. Gait  x 75 feet with Stand-by Assistance using Rolling Walker.                          Reasons for Discontinuation of Therapy Services  Transfer to alternate level of care.      Plan:     Patient Discharged to: Home with Home Health Service.      10/10/2022

## 2022-10-12 ENCOUNTER — DOCUMENT SCAN (OUTPATIENT)
Dept: HOME HEALTH SERVICES | Facility: HOSPITAL | Age: 57
End: 2022-10-12
Payer: COMMERCIAL

## 2022-10-18 ENCOUNTER — OFFICE VISIT (OUTPATIENT)
Dept: SURGERY | Facility: CLINIC | Age: 57
End: 2022-10-18
Payer: COMMERCIAL

## 2022-10-18 VITALS
TEMPERATURE: 98 F | HEIGHT: 68 IN | WEIGHT: 184 LBS | SYSTOLIC BLOOD PRESSURE: 145 MMHG | DIASTOLIC BLOOD PRESSURE: 85 MMHG | RESPIRATION RATE: 20 BRPM | HEART RATE: 85 BPM | OXYGEN SATURATION: 97 % | BODY MASS INDEX: 27.89 KG/M2

## 2022-10-18 DIAGNOSIS — M86.9 OSTEOMYELITIS OF THIRD TOE OF RIGHT FOOT: Primary | ICD-10-CM

## 2022-10-18 PROCEDURE — 99214 OFFICE O/P EST MOD 30 MIN: CPT | Mod: PBBFAC

## 2022-10-18 NOTE — PROGRESS NOTES
I have reviewed the notes, assessments, and/or procedures performed by the resident, I concur with her/his documentation of Maribel Araiza.     Iqra Lou MD

## 2022-10-18 NOTE — PROGRESS NOTES
Surgery Clinic Note     CC: 2wk f/u post right third toe amputation on 10/5/2022    HPI:  Maribel Araiza 57 y.o. female with PMHx of HTN and T2DM that was diagnosed at her last hospital visit, presents to clinic for 2 wk f/u post right third toe amputation. Patient reports no pain with right foot, and is moving with scooter. Patient reports no abnormal changes in right foot, with decreased edema on right lower extremity. Patient has continued to take her metformin, losartan and insulin detemir at home with controlled diabetes. Denies f/c/n/v/CP/SOB.    ROS:  10 system ROS negative unless specified in HPI    PMH:   Past Medical History:   Diagnosis Date    Acquired deformity of right foot 10/10/2022    Acquired hammertoes of both feet 10/10/2022    History of osteomyelitis 10/10/2022    Open wound of lesser toe of right foot 10/10/2022    Primary hypertension 10/10/2022    Status post amputation of lesser toe of right foot 10/10/2022    Type 2 diabetes mellitus with skin complication, without long-term current use of insulin 10/10/2022        PSH:   Past Surgical History:   Procedure Laterality Date    TOE AMPUTATION Right 10/5/2022    Procedure: AMPUTATION, right third toe;  Surgeon: Glen Roth MD;  Location: [facility];  Service: General;  Laterality: Right;        Fam Hx: No family history on file.     Social Hx:   Social History     Tobacco Use    Smoking status: Never    Smokeless tobacco: Never   Substance Use Topics    Alcohol use: Not Currently    Drug use: Never        Allergies: Review of patient's allergies indicates:  No Known Allergies     ROS: Negative except above     Current Outpatient Medications on File Prior to Visit   Medication Sig Dispense Refill    blood sugar diagnostic Strp Check blood glucose in the morning and before each meal. 100 each 11    blood-glucose meter Misc Check blood glucose in the morning and before each meal. 1 each 1    insulin detemir U-100 (LEVEMIR) 100 unit/mL injection  "Inject 10 Units into the skin every evening. 6 mL 1    lancets (ACCU-CHEK MULTICLIX LANCET) Misc Check blood glucose in the morning and before each meal. 100 each 11    losartan (COZAAR) 50 MG tablet Take 1 tablet (50 mg total) by mouth once daily. 90 tablet 3    metFORMIN (GLUCOPHAGE-XR) 500 MG ER 24hr tablet Take 2 tablets (1,000 mg total) by mouth daily with breakfast. 180 tablet 1    sulfamethoxazole-trimethoprim 800-160mg (BACTRIM DS) 800-160 mg Tab Take 2 tablets by mouth 2 (two) times daily. (Patient not taking: Reported on 10/18/2022) 16 tablet 0     No current facility-administered medications on file prior to visit.       Physical Exam  BP (!) 145/85 (BP Location: Right arm, Patient Position: Sitting, BP Method: Medium (Automatic))   Pulse 85   Temp 98.2 °F (36.8 °C) (Oral)   Resp 20   Ht 5' 7.72" (1.72 m)   Wt 83.5 kg (184 lb)   SpO2 97%   BMI 28.21 kg/m²   General: NAD, AAO X 3  CV: Regular rate and rhythm without murmurs  Resp: Clear to ascultation bilaterally  Abdomen: soft, non-tender, non-distended, bowel sounds present  Extremity: non-edematous lower extremities, palpable PT/DP on right lower extremity, right third toe incision site is dry, intact, non-erythemtous, and no discharge.    Surgical Pathology:  Final Diagnosis      1. Right 3rd toe, amputation:   - Acute osteomyelitis and necrosis of the adjacent soft tissue.    2. Third metatarsal head, excision:   - Fragments of bone with no histologic evidence of osteomyelitis.         Electronically signed by Esther Cherry MD on 10/10/2022 at 1531     ASSESSMENT/PLAN   Maribel Araiza 57 y.o. female with PMHx of HTN and T2DM diagnosed during her last hospital stay, presents to clinic for 2 wk f/u s/p right third toe amputation.     - RTC PRN  - Sutures removed today  - Continue home health nurse wound dressing  - Remaining care per wound care, next appointment Thursday 10/20    Polo Thomas  [employer] MS3    Above note edited as needed by  Kevin " MD Trice  [employer] General Surgery PGY-1

## 2022-10-19 ENCOUNTER — OFFICE VISIT (OUTPATIENT)
Dept: FAMILY MEDICINE | Facility: CLINIC | Age: 57
End: 2022-10-19
Payer: COMMERCIAL

## 2022-10-19 VITALS
SYSTOLIC BLOOD PRESSURE: 121 MMHG | TEMPERATURE: 98 F | HEIGHT: 67 IN | BODY MASS INDEX: 28.88 KG/M2 | WEIGHT: 184 LBS | HEART RATE: 95 BPM | OXYGEN SATURATION: 100 % | RESPIRATION RATE: 18 BRPM | DIASTOLIC BLOOD PRESSURE: 84 MMHG

## 2022-10-19 DIAGNOSIS — Z12.39 ENCOUNTER FOR SCREENING FOR MALIGNANT NEOPLASM OF BREAST, UNSPECIFIED SCREENING MODALITY: ICD-10-CM

## 2022-10-19 DIAGNOSIS — L08.9 DIABETIC INFECTION OF RIGHT FOOT: ICD-10-CM

## 2022-10-19 DIAGNOSIS — Z13.220 LIPID SCREENING: Primary | ICD-10-CM

## 2022-10-19 DIAGNOSIS — Z12.31 SCREENING MAMMOGRAM FOR BREAST CANCER: ICD-10-CM

## 2022-10-19 DIAGNOSIS — I10 PRIMARY HYPERTENSION: ICD-10-CM

## 2022-10-19 DIAGNOSIS — Z13.29 SCREENING FOR HYPOTHYROIDISM: ICD-10-CM

## 2022-10-19 DIAGNOSIS — E11.628 DIABETIC INFECTION OF RIGHT FOOT: ICD-10-CM

## 2022-10-19 PROCEDURE — 99215 OFFICE O/P EST HI 40 MIN: CPT | Mod: PBBFAC | Performed by: NURSE PRACTITIONER

## 2022-10-19 RX ORDER — METFORMIN HYDROCHLORIDE 500 MG/1
1000 TABLET ORAL 2 TIMES DAILY WITH MEALS
Qty: 360 TABLET | Refills: 0 | Status: SHIPPED | OUTPATIENT
Start: 2022-10-19 | End: 2022-10-20

## 2022-10-19 NOTE — PROGRESS NOTES
"  Family Medicine Clinic Note:    PCP: Sherly Lawrence MD    Maribel Araiza is a 57 y.o. female presents to clinic today for f/up HTN,       Subjective:   Maribel Araiza is a 57 y.o. female with a history of gestational diabetes who presented to [facility] ED on 10/2/2022  with complaint of right foot 3rd toe wound.     Diabetes Type 2  Glucose log  AM: 145, 162, 143, 142, 144, 192  PP: 112, 137, 114, 148, 158, 182  PM:173, 151, 196, 155, 130, 154, 166  On lantus 10 units qhs      HTN:  Bp logs:   Am:129/81, 114/73, 110/77, 115/75, 112/77, 120/80,   Lunch:141/86  Pm: 124/84      HM: Reports last mammogram 2014    ROS  Constitutional: No fevers, chills or fatigue  Respiratory: no trouble breathing or SOB  Cardiovascular: no chest pain or tightness, no SOB on activity, no ankle swelling  Gastrointestinal: no constipation, no diarrhea, no nausea, no vomiting  MSK: no foot pain currently   Current Outpatient Medications   Medication Sig Dispense Refill    blood sugar diagnostic Strp Check blood glucose in the morning and before each meal. 100 each 11    blood-glucose meter Misc Check blood glucose in the morning and before each meal. 1 each 1    insulin detemir U-100 (LEVEMIR) 100 unit/mL injection Inject 10 Units into the skin every evening. 6 mL 1    lancets (ACCU-CHEK MULTICLIX LANCET) Misc Check blood glucose in the morning and before each meal. 100 each 11    losartan (COZAAR) 50 MG tablet Take 1 tablet (50 mg total) by mouth once daily. 90 tablet 3    metFORMIN (GLUCOPHAGE-XR) 500 MG ER 24hr tablet Take 2 tablets (1,000 mg total) by mouth daily with breakfast. 180 tablet 1     No current facility-administered medications for this visit.       Objective:     /84 (BP Location: Right arm, Patient Position: Sitting, BP Method: Large (Automatic))   Pulse 95   Temp 97.5 °F (36.4 °C) (Oral)   Resp 18   Ht 5' 7" (1.702 m)   Wt 83.5 kg (184 lb)   SpO2 100%   BMI 28.82 kg/m²   BP Readings from Last 3 " Encounters:   10/19/22 121/84   10/18/22 (!) 145/85   10/10/22 125/69     Wt Readings from Last 3 Encounters:   10/19/22 83.5 kg (184 lb)   10/18/22 83.5 kg (184 lb)   10/03/22 83.5 kg (184 lb 1.4 oz)     No results found for this or any previous visit (from the past 200 hour(s)).  Body mass index is 28.82 kg/m².    Physical Exam  Constitutional: NAD, using a scooter for mobility assistance  Lungs: CTAB, No crackles, No wheezes  Cardiovascular: Normal heart sounds, No MRG  Abdomen: Soft, Nontender, No palpable hepatosplenomegaly, No abdominal masses, No ascites  Extremities: Dressing to right foot dry and intact. Peripheral pulses palpable +2 to lower and upper extremity    Assessment:   Maribel Araiza is a 57 y.o. female with:    1. Lipid screening    2. Diabetic infection of right foot    3. Screening for hypothyroidism    4. Encounter for screening for malignant neoplasm of breast, unspecified screening modality    5. Primary hypertension    6. S/p  amputation right foot 3rd digit    Plan:     Diabetes type 2  -Increased the Lantus to 12 units qhs  -Increase the metformin to 1000 mg in am and 500 mg at night x every 2 weeks with a goal of 1000 mg BID to reduce GI side effects. Goal A1C reduction is 1-1.5% at maximum dose.   -Then will add Ozympic  (GLP1) at 0.25 mg x 4 weeks until 1 mg weekly, then may increase by 0.25 mg weekly until she reaches the max dose of 2 mg weekly if she requires and if tolerated  for cardiovascular risk reduction. Goal reduction of A1c is 1.5% and additional benefit of weight loss  -Will increase the lantus to 12 units.  Will begin to reduce the lantus once Ozympic is started and she has reached the maximum dose of the metformin.   -Ordered lipid panel, TSH, Free T4, vitamin B12 and vitamin D level fasting in am  -Bring logs every visit    HTN  Continue losartan 50 mg daily    S/p right foot 3rd digit amputation  Keep wound care clinic appointment  Stiches  out yesterday      HM: Breast  mammogram screening Bilat with Eric ordered today (10/19/22)    RTC in 2 weeks         Follow up in about 2 weeks (around 11/2/2022).    Future Appointments   Date Time Provider Department Center   10/20/2022  9:30 AM TIM Perez [facility] OPWND Kristopher Manuel   11/3/2022  3:20 PM RESIDENT 13, [facility] FAMILY MEDICINE [facility] RES Kristopher Manuel       Staff: Dr. Maribel Maxwell MD  Family Medicine PGY-2

## 2022-10-20 ENCOUNTER — HOSPITAL ENCOUNTER (OUTPATIENT)
Dept: WOUND CARE | Facility: HOSPITAL | Age: 57
Discharge: HOME | End: 2022-10-20
Attending: NURSE PRACTITIONER
Payer: COMMERCIAL

## 2022-10-20 VITALS — DIASTOLIC BLOOD PRESSURE: 79 MMHG | RESPIRATION RATE: 20 BRPM | SYSTOLIC BLOOD PRESSURE: 120 MMHG | HEART RATE: 83 BPM

## 2022-10-20 DIAGNOSIS — M20.42 ACQUIRED HAMMERTOES OF BOTH FEET: ICD-10-CM

## 2022-10-20 DIAGNOSIS — M21.961 ACQUIRED DEFORMITY OF RIGHT FOOT: ICD-10-CM

## 2022-10-20 DIAGNOSIS — I10 PRIMARY HYPERTENSION: ICD-10-CM

## 2022-10-20 DIAGNOSIS — Z89.421 STATUS POST AMPUTATION OF LESSER TOE OF RIGHT FOOT: ICD-10-CM

## 2022-10-20 DIAGNOSIS — E11.628 TYPE 2 DIABETES MELLITUS WITH OTHER SKIN COMPLICATION, WITHOUT LONG-TERM CURRENT USE OF INSULIN: ICD-10-CM

## 2022-10-20 DIAGNOSIS — M20.41 ACQUIRED HAMMERTOES OF BOTH FEET: ICD-10-CM

## 2022-10-20 DIAGNOSIS — S91.104A OPEN WOUND OF LESSER TOE OF RIGHT FOOT: Primary | ICD-10-CM

## 2022-10-20 PROCEDURE — 99212 OFFICE O/P EST SF 10 MIN: CPT | Mod: 25,,, | Performed by: NURSE PRACTITIONER

## 2022-10-20 PROCEDURE — 97597 PR DEBRIDEMENT OPEN WOUND 20 SQ CM<: ICD-10-PCS | Mod: ,,, | Performed by: NURSE PRACTITIONER

## 2022-10-20 PROCEDURE — 97597 DBRDMT OPN WND 1ST 20 CM/<: CPT | Mod: ,,, | Performed by: NURSE PRACTITIONER

## 2022-10-20 PROCEDURE — 99212 PR OFFICE/OUTPT VISIT, EST, LEVL II, 10-19 MIN: ICD-10-PCS | Mod: 25,,, | Performed by: NURSE PRACTITIONER

## 2022-10-20 PROCEDURE — 97597 DBRDMT OPN WND 1ST 20 CM/<: CPT

## 2022-10-20 PROCEDURE — 99211 OFF/OP EST MAY X REQ PHY/QHP: CPT

## 2022-10-20 NOTE — PROGRESS NOTES
TODAY'S VISIT NOTE WAS IMPORTED FROM LAST WOUND CLINIC VISIT OF 10/10/2022.  I ATTEST THAT I REVIEWED THE HPI, ROS, LABS, WOUND-RELATED IMAGING, PHYSICAL EXAMINATION, EVALUATION AND PLAN SECTIONS OF IMPORTED NOTE AND REVISED TODAY'S VISIT NOTE TO REFLECT TODAY'S NEW ASSESSMENT FINDINGS AND TODAY'S UPDATES TO WOUND TREATMENT PLAN.          CHIEF COMPLAINT:    Surgical wound to right foot      HISTORY OF  PRESENT ILLNESS:  57 y.o. White female being seen today for follow-up of surgical wound to right 3rd toe.  Hospitalized @ [facility] 10/2/2022 - 10/7/2022, where she underwent amputation of toe due to osteomyelitis; also initially septic with SIRS 2/4.  Diagnosed with Type 2 diabetes during hospitalization; she was started on insulin regimen, while inpatient.  Discharged home with basal Levemir 10 units nightly, along with Metformin ER 1000 mg.  Previously diagnosed with gestational diabetes.  Was also prescribed Losartan 50 mg daily, for newly diagnosed hypertension.  Discharged home with 4-day course of Bactrim to finish 10-day course, which was initiated inpatient.  Followed by Dr. Maxwell in [facility] for PCP, whom cared for Ms. Araiza during recent hospitalization.   Had not been followed by physician for years.  Current wound care: cleaning with Vashe, followed by silver collagen, followed by secondary dressing.  Being changed QOD.  Followed by [facility].  History includes:        Past Medical History:   Diagnosis Date    Acquired deformity of right foot 10/10/2022    Acquired hammertoes of both feet 10/10/2022    History of osteomyelitis 10/10/2022    Open wound of lesser toe of right foot 10/10/2022    Primary hypertension 10/10/2022    Status post amputation of lesser toe of right foot 10/10/2022    Type 2 diabetes mellitus with skin complication, without long-term current use of insulin 10/10/2022      Past Surgical History:   Procedure Laterality Date    TOE AMPUTATION Right 10/5/2022     Procedure: AMPUTATION, right third toe;  Surgeon: Glen Roth MD;  Location: [facility];  Service: General;  Laterality: Right;      Social History     Socioeconomic History    Marital status:    Tobacco Use    Smoking status: Never    Smokeless tobacco: Never   Substance and Sexual Activity    Alcohol use: Not Currently    Drug use: Never       Review of Most Recent Wound Care-Related Labs:  Lab Results   Component Value Date/Time    WBC 5.3 10/07/2022 03:11 AM    RBC 4.28 10/07/2022 03:11 AM    HGB 12.9 10/07/2022 03:11 AM    HCT 37.3 10/07/2022 03:11 AM    MCV 87.1 10/07/2022 03:11 AM    MCH 30.1 10/07/2022 03:11 AM    CREATININE 0.58 10/07/2022 03:11 AM    HGBA1C 10.8 (H) 10/02/2022 01:18 PM    .00 (H) 10/02/2022 01:27 PM          Today 10/20/2022:  No longer feeling jittery; believes that was related to Bactrim.  Sutures removed by general surgery on 10/18/2022.  Surgery has signed off on case.  Wound doing well since then.  Has been off-loading foot with knee walker; however, wheel fell off earlier today.  Going to [email or web address] to get scooter fixed after today's visit.  Also using [employer] surgical shoe.  No reported complications with wound care or current treatment plan.  Has all wound care supplies in home.  Denies fevers, chills, wound odor, wound redness, wound pain, or yellow/green drainage.  No new rashes, lesions, or skin breakdown.  Needs RX for diabetic shoes for left foot deformity and hammertoes.      10/10/2022:  Tolerating Bactrim without side effects.  Has a couple of doses remaining.  Using knee walker for off-loading right foot.  Current wound care:  cleaning with Vashe, followed by non-contact layer dressing, followed by secondary dressing.  Being done daily.  No new complaints with wound or current wound care regimen.  Denies fevers, chills, wound redness, wound pain, wound odors, or yellow/green drainage.   Feels nervous inside after taking Metformin in morning.  Not having and  GI s/e with Metformin though.        REVIEW OF SYSTEMS:  Except as stated in HPI, all other 10 body systems normal.     Current Outpatient Medications   Medication Sig Dispense Refill    blood sugar diagnostic Strp Check blood glucose in the morning and before each meal. 100 each 11    blood-glucose meter Misc Check blood glucose in the morning and before each meal. 1 each 1    insulin detemir U-100 (LEVEMIR) 100 unit/mL injection Inject 10 Units into the skin every evening. 6 mL 1    lancets (ACCU-CHEK MULTICLIX LANCET) Misc Check blood glucose in the morning and before each meal. 100 each 11    losartan (COZAAR) 50 MG tablet Take 1 tablet (50 mg total) by mouth once daily. 90 tablet 3     No current facility-administered medications for this encounter.         PHYSICAL EXAMINATION AND VITAL SIGNS:    Vitals:    10/20/22 0957   BP: 120/79   Pulse: 83   Resp: 20     There is no height or weight on file to calculate BMI.      General:  VSS, afebrile.  Well-nourished, in no acute distress.  Mother-in-law with her.  Respiratory: breathing even, quiet, and unlabored.  No coughing.  Cardiovascular:   Mild non-pitting edema to right dorsal and lateral foot.  No hemosiderin staining or varicosities.   Scattered hair over dorsal toes.  Evidence of shaving of legs.   Toenails overgrown, thickened, and dystrophic.   Musculoskeletal:   Right 3rd toe amputation incision with sutures removed.  Loss of intrinsic mucle ROM bilaterally.  Full dorsiflexion and flexion of ankles and hallux.  Significant bony deformity of right foot with widened forefoot, high arch with prominent metatarsal heads, and severe 2nd hammertoe.  Hammertoes to left 2-4th toes; however, not as severe.    Neurologic:  A&O X 3.  Cranial nerves grossly intact.     Psychiatric:  Calm, cooperative.  Mood and effect normal.  Responses appropriate.   Integumentary:    Surgical wound to right 3rd toe space.  Sutures have been removed.  100% black thin eschar across  surgical incision.  Wound bed flush with surrounding skin.  No erythema, purulent drainage, periwound edema, odors, induration, bogginess, or fluctuance.              Incision/Site 10/10/22 0828 Right Toe, third anterior;midline (Active)   10/10/22 0828   Present Prior to Hospital Arrival?: Yes   Side: Right   Location: Toe, third   Orientation: anterior;midline   Incision Type:    Closure Method:    Additional Comments:    Removal Indication and Assessment:    Wound Outcome: Amputation   Removal Indications:    Wound Image   10/20/22 1001   Dressing Appearance Dried drainage 10/20/22 1001   Drainage Amount Scant 10/20/22 1001   Drainage Characteristics/Odor Serosanguineous 10/20/22 1001   Wound Length (cm) 1 cm 10/20/22 1001   Wound Width (cm) 1 cm 10/20/22 1001   Wound Depth (cm) 0.1 cm 10/20/22 1001   Wound Volume (cm^3) 0.1 cm^3 10/20/22 1001   Wound Surface Area (cm^2) 1 cm^2 10/20/22 1001                   ASSESSMENT AND PLAN:    Type 2 diabetes with skin complication:  S/P right 3rd toe amputation @ [facility] on 10/5/2022 due to osteomyelitis.  Finished up 10-day course of Bactrim ordered upon hospital discharge, without side effects.   New dx of Type 2 diabetes, non-insulin dependent.      Open wound right 3rd toe:  Sutures removed by surgery on 10/18/2022; surgery s/o on case.  DP and PT pulses dopplered bilaterally; no hx of smoking.  Is off-loading with a knee walker, which I encouraged her to continue once she gets wheel fixed.                             PROCEDURE NOTE    Procedure Today:  Selective, non-excisional, non-surgical bedside debridement (wound management 24604):        Rationale for debridement:  Non-viable eschar can delay wound healing and promote infection.        Expected outcome with sharps debridement:  Faster wound healing, decreased risk of infection, and response to topical antibacterials wound products.       Informed written consent obtained.     No topical anesthetic required as Ms.  "Jose G did not experience any discomfort with procedure.      Using #4 dermal curette, I gently lifted, and scraped away, non-viable eschar.  Minimal bleeding, which was controlled with manual pressure.  Patient reported no discomfort during procedure.  No reported complications.     Wound dimensions did not change post-procedure, as I only removed non-viable tissue, which was not innervated and nonvascularized.         Wound Care Today/New Wound Care Tx Plan:  Clean wound with Vashe and pat dry, followed by plain collagen, followed by secondary dressing.  To be changed QD.       Right foot deformity:  Certificate of Medical Necessity and prescription for extra depth shoes and moldable inserts completed for Dr. Maxwell's signature.  Instructed Ms. Araiza on insurance rules for those DME products and encouraged her to have Dr. Maxwell sign and take to [facility].  Phone number and address provided to [facility].  10/10/22:  No reported trauma.  States "my arch fell during COVID and I wasn't going anywhere near a hospital."  Discussed role foot deformities play in development of DFU and importance of properly-fitting shoes.  She is wearing a [employer] surgical shoe now; toes are cleared from friction and pressure.  Instructed on diabetic shoes and that PCP would need to order those for her.  Communication sent to Dr. Maxwell regarding foot deformity and need for diabetic shoes.  Plan:  I will provide completed Certificate of Medical Necessity and order for Dr. Maxwell's signature.  Ms. Araiza can take those forms to her appt with Dr. Maxwell; then, we can send her to [facility] for fitting.      Hammertoes:  Severe deformity of right 2nd toe.    Has been referred to a podiatrist during inpatient hospitalization; visit pending.        Overgrown, thickened, and dystrophic toenails:  Will plan on trimming them at future appointment.              Return to clinic in one week.  Teaching " reinforced on s/s to call wound clinic for promptly.

## 2022-10-20 NOTE — PROGRESS NOTES
Faculty Attestation: Maribel Jose G  [place] [facility] [place]. Discussed with resident at the time of the visit. History of Present Illness, Physical Exam, and Assessment and Plan reviewed. Treatment plan is reasonable and appropriate. Compliance with treatment recommendations is important.       Maribel Lucia MD  Family/Sports Medicine

## 2022-10-24 ENCOUNTER — TELEPHONE (OUTPATIENT)
Dept: FAMILY MEDICINE | Facility: CLINIC | Age: 57
End: 2022-10-24
Payer: COMMERCIAL

## 2022-10-24 NOTE — TELEPHONE ENCOUNTER
Patient states your were to send Metformin  2 am,and 1 pm at [facility] in [place].  Thank you.

## 2022-10-26 RX ORDER — METFORMIN HYDROCHLORIDE 500 MG/1
1000 TABLET, EXTENDED RELEASE ORAL 2 TIMES DAILY WITH MEALS
Qty: 360 TABLET | Refills: 0 | Status: SHIPPED | OUTPATIENT
Start: 2022-10-26 | End: 2022-11-15

## 2022-10-28 ENCOUNTER — HOSPITAL ENCOUNTER (OUTPATIENT)
Dept: WOUND CARE | Facility: HOSPITAL | Age: 57
Discharge: HOME | End: 2022-10-28
Attending: NURSE PRACTITIONER
Payer: COMMERCIAL

## 2022-10-28 VITALS — DIASTOLIC BLOOD PRESSURE: 81 MMHG | HEART RATE: 102 BPM | RESPIRATION RATE: 20 BRPM | SYSTOLIC BLOOD PRESSURE: 134 MMHG

## 2022-10-28 DIAGNOSIS — Z87.39 HISTORY OF OSTEOMYELITIS: ICD-10-CM

## 2022-10-28 DIAGNOSIS — E11.628 TYPE 2 DIABETES MELLITUS WITH OTHER SKIN COMPLICATION, WITHOUT LONG-TERM CURRENT USE OF INSULIN: ICD-10-CM

## 2022-10-28 DIAGNOSIS — S91.104A OPEN WOUND OF LESSER TOE OF RIGHT FOOT: Primary | ICD-10-CM

## 2022-10-28 DIAGNOSIS — L60.2 OVERGROWN TOENAILS: ICD-10-CM

## 2022-10-28 DIAGNOSIS — Z89.421 STATUS POST AMPUTATION OF LESSER TOE OF RIGHT FOOT: ICD-10-CM

## 2022-10-28 DIAGNOSIS — M20.41 ACQUIRED HAMMERTOES OF BOTH FEET: ICD-10-CM

## 2022-10-28 DIAGNOSIS — M20.42 ACQUIRED HAMMERTOES OF BOTH FEET: ICD-10-CM

## 2022-10-28 DIAGNOSIS — M21.961 ACQUIRED DEFORMITY OF RIGHT FOOT: ICD-10-CM

## 2022-10-28 DIAGNOSIS — B35.1 MYCOTIC TOENAILS: ICD-10-CM

## 2022-10-28 PROCEDURE — 99213 PR OFFICE/OUTPT VISIT, EST, LEVL III, 20-29 MIN: ICD-10-PCS | Mod: 25,,, | Performed by: NURSE PRACTITIONER

## 2022-10-28 PROCEDURE — 11721 DEBRIDE NAIL 6 OR MORE: CPT | Mod: ,,, | Performed by: NURSE PRACTITIONER

## 2022-10-28 PROCEDURE — 11719 TRIM NAIL(S) ANY NUMBER: CPT

## 2022-10-28 PROCEDURE — 99211 OFF/OP EST MAY X REQ PHY/QHP: CPT | Mod: 59

## 2022-10-28 PROCEDURE — 11721 PR DEBRIDEMENT OF NAILS, 6 OR MORE: ICD-10-PCS | Mod: ,,, | Performed by: NURSE PRACTITIONER

## 2022-10-28 PROCEDURE — 11721 DEBRIDE NAIL 6 OR MORE: CPT

## 2022-10-28 PROCEDURE — 99213 OFFICE O/P EST LOW 20 MIN: CPT | Mod: 25,,, | Performed by: NURSE PRACTITIONER

## 2022-10-28 NOTE — PROGRESS NOTES
TODAY'S VISIT NOTE WAS IMPORTED FROM LAST WOUND CLINIC VISIT OF 10/20/2022.  I ATTEST THAT I REVIEWED THE HPI, ROS, LABS, WOUND-RELATED IMAGING, PHYSICAL EXAMINATION, EVALUATION AND PLAN SECTIONS OF IMPORTED NOTE AND REVISED TODAY'S VISIT NOTE TO REFLECT TODAY'S NEW ASSESSMENT FINDINGS AND TODAY'S UPDATES TO WOUND TREATMENT PLAN.          CHIEF COMPLAINT:    Surgical wound to right foot      HISTORY OF  PRESENT ILLNESS:  57 y.o. White female being seen today for follow-up of surgical wound to right 3rd toe.  Hospitalized @ [facility] 10/2/2022 - 10/7/2022, where she underwent amputation of right 3rd toe toe due to osteomyelitis; also initially septic with SIRS 2/4.  Diagnosed with Type 2 diabetes during hospitalization; she was started on insulin regimen, while inpatient.  Discharged home with basal Levemir 10 units nightly, along with Metformin ER 1000 mg.  Previously diagnosed with gestational diabetes.  Was also prescribed Losartan 50 mg daily, for newly diagnosed hypertension.  Discharged home with 4-day course of Bactrim to finish 10-day course, which was initiated inpatient.  Followed by Dr. Maxwell in [facility] for PCP, whom cared for Ms. Araiza during recent hospitalization.   Had not been followed by physician for years.  Current wound care: cleaning with Vashe, followed by silver collagen, followed by secondary dressing.  Being changed QOD.  Followed by [facility].  History includes:        Past Medical History:   Diagnosis Date    Acquired deformity of right foot 10/10/2022    Acquired hammertoes of both feet 10/10/2022    History of osteomyelitis 10/10/2022    Open wound of lesser toe of right foot 10/10/2022    Primary hypertension 10/10/2022    Status post amputation of lesser toe of right foot 10/10/2022    Type 2 diabetes mellitus with skin complication, without long-term current use of insulin 10/10/2022      Past Surgical History:   Procedure Laterality Date    TOE AMPUTATION Right  10/5/2022    Procedure: AMPUTATION, right third toe;  Surgeon: Glen Roth MD;  Location: [facility];  Service: General;  Laterality: Right;      Social History     Socioeconomic History    Marital status:    Tobacco Use    Smoking status: Never    Smokeless tobacco: Never   Substance and Sexual Activity    Alcohol use: Not Currently    Drug use: Never       Review of Most Recent Wound Care-Related Labs:  Lab Results   Component Value Date/Time    WBC 5.3 10/07/2022 03:11 AM    RBC 4.28 10/07/2022 03:11 AM    HGB 12.9 10/07/2022 03:11 AM    HCT 37.3 10/07/2022 03:11 AM    MCV 87.1 10/07/2022 03:11 AM    MCH 30.1 10/07/2022 03:11 AM    CREATININE 0.58 10/07/2022 03:11 AM    HGBA1C 10.8 (H) 10/02/2022 01:18 PM    .00 (H) 10/02/2022 01:27 PM          Today 10/28/2022:  Evaluated by Dr. Caballero, podiatrist, yesterday.  He has ordered MRI of right foot.  Told her she had a Charcot foot, complicated by arthritis.  Unable to do tendon release on right 2nd toe at bedside as contracture is too advanced.  Scheduled to see PCP, Dr. Maxwell, 11/3/2022 and will f/u on orthotic order for foot deformity shoe fabrication.  Did get her knee walker fixed and is back to using that for off-loading.  No reported complications with wound care or current treatment plan.  Has all wound care supplies in home.  Denies fevers, chills, wound odor, wound redness, wound pain, or yellow/green drainage.  No new rashes, lesions, or skin breakdown.  CBGs are ranging 140's prior to breakfasts.  Taking all medications, as prescribed; keeping a written journal for upcoming appt with PCP.     10/20/2022:  No longer feeling jittery; believes that was related to Bactrim.  Sutures removed by general surgery on 10/18/2022.  Surgery has signed off on case.  Wound doing well since then.  Has been off-loading foot with knee walker; however, wheel fell off earlier today.  Going to [employer] to get scooter fixed after today's visit.  Also  using Darco surgical shoe.  No reported complications with wound care or current treatment plan.  Has all wound care supplies in home.  Denies fevers, chills, wound odor, wound redness, wound pain, or yellow/green drainage.  No new rashes, lesions, or skin breakdown.  Needs RX for diabetic shoes for left foot deformity and hammertoes.      10/10/2022:  Tolerating Bactrim without side effects.  Has a couple of doses remaining.  Using knee walker for off-loading right foot.  Current wound care:  cleaning with Vashe, followed by non-contact layer dressing, followed by secondary dressing.  Being done daily.  No new complaints with wound or current wound care regimen.  Denies fevers, chills, wound redness, wound pain, wound odors, or yellow/green drainage.   Feels nervous inside after taking Metformin in morning.  Not having and GI s/e with Metformin though.        REVIEW OF SYSTEMS:  Except as stated in HPI, all other 10 body systems normal.     Current Outpatient Medications   Medication Sig Dispense Refill    blood sugar diagnostic Strp Check blood glucose in the morning and before each meal. 100 each 11    blood-glucose meter Misc Check blood glucose in the morning and before each meal. 1 each 1    insulin detemir U-100 (LEVEMIR) 100 unit/mL injection Inject 10 Units into the skin every evening. 6 mL 1    lancets (ACCU-CHEK MULTICLIX LANCET) Misc Check blood glucose in the morning and before each meal. 100 each 11    losartan (COZAAR) 50 MG tablet Take 1 tablet (50 mg total) by mouth once daily. 90 tablet 3    metFORMIN (GLUCOPHAGE-XR) 500 MG ER 24hr tablet Take 2 tablets (1,000 mg total) by mouth 2 (two) times daily with meals. 360 tablet 0     No current facility-administered medications for this encounter.         PHYSICAL EXAMINATION AND VITAL SIGNS:    Vitals:    10/28/22 0930   BP: 134/81   Pulse: 102   Resp: 20     There is no height or weight on file to calculate BMI.      General:  VSS, afebrile.   Well-nourished, in no acute distress.  Mother-in-law with her.  Respiratory: breathing even, quiet, and unlabored.  No coughing.  Cardiovascular:   Mild non-pitting edema to right dorsal and lateral foot.  No hemosiderin staining or varicosities.   Scattered hair over dorsal toes.  Evidence of shaving of legs.   Toenails overgrown, thickened, and dystrophic.   Musculoskeletal:   Right 3rd toe amputation incision with sutures removed.  Loss of intrinsic mucle ROM bilaterally.  Full dorsiflexion and ankles and hallux.  Decreased flexion of bilateral ankles.  Significant bony deformity of right foot with widened forefoot, high arch with prominent metatarsal heads, and severe 2nd hammertoe.  Hammertoes to left 2-4th toes; however, not as severe.    Neurologic:  A&O X 3.  Cranial nerves grossly intact.   Normal monofilament testing bilaterally; no LOPS.    Psychiatric:  Calm, cooperative.  Mood and effect normal.  Responses appropriate.   Integumentary:    Surgical wound to right 3rd toe space.  Sutures have been removed.  100% moist dark red drainage embedded in wound, which I was able to mechanically debride away with moistened gauze.  Depth of wound approximately 0.3 cm after cleaning.  No erythema, purulent drainage, periwound edema, odors, induration, bogginess, or fluctuance.              Incision/Site 10/10/22 0828 Right Toe, third anterior;midline (Active)   10/10/22 0828   Present Prior to Hospital Arrival?: Yes   Side: Right   Location: Toe, third   Orientation: anterior;midline   Incision Type:    Closure Method:    Additional Comments:    Removal Indication and Assessment:    Wound Outcome: Amputation   Removal Indications:    Wound Image   10/28/22 0930   Dressing Appearance Moist drainage 10/28/22 0930   Drainage Amount Small 10/28/22 0930   Drainage Characteristics/Odor Serosanguineous 10/28/22 0930   Appearance Pink 10/28/22 0930   Wound Length (cm) 1.3 cm 10/28/22 0930   Wound Width (cm) 1.1 cm 10/28/22  "0930   Wound Depth (cm) 0.2 cm 10/28/22 0930   Wound Volume (cm^3) 0.286 cm^3 10/28/22 0930   Wound Surface Area (cm^2) 1.43 cm^2 10/28/22 0930                     ASSESSMENT AND PLAN:    Type 2 diabetes with skin complication:  S/P right 3rd toe amputation @ [facility] on 10/5/2022 due to osteomyelitis.  Finished up 10-day course of Bactrim ordered upon hospital discharge, without side effects.   New dx of Type 2 diabetes, non-insulin dependent.      Open wound right 3rd toe:  Sutures removed by surgery on 10/18/2022; surgery s/o on case.  DP and PT pulses dopplered bilaterally; no hx of smoking.  Is off-loading with a knee walker, which I encouraged her to continue, with rationale.    Wound Care Today/New Wound Care Tx Plan:  Pending delivery of topically CMPD powdered antibiotics:  Clean wound with Vashe and pat dry, followed by silver collagen, followed by secondary dressing.  To be changed QOD.     RX:  Vanc 5%/Tobra 4%/Voricon 2.    RX:  Dakins 0.25%.   Clean wound with Dakins 0.25%, followed by silver collagen, followed by secondary dressing.  To be done Q12 H.   Teaching provided on new medication, expected outcomes of medication, how to administer medication, and possible s/e of medications.      Right foot deformity:  Seen by Dr. Caballero, podiatrist, 10/27/2022 with planned MRI.  He will see her for consult after MRI complete.  She is agreeable to surgical intervention at this time, due to intact arterial flow in legs.   10/20/2022:Certificate of Medical Necessity and prescription for extra depth shoes and moldable inserts completed for Dr. Maxwell's signature.  Instructed Ms. Araiza on insurance rules for those DME products and encouraged her to have Dr. Maxwell sign and take to [facility].  Phone number and address provided to [facility].  10/10/22:  No reported trauma.  States "my arch fell during COVID and I wasn't going anywhere near a hospital."  Discussed role foot deformities play in " development of DFU and importance of properly-fitting shoes.  She is wearing a Darco surgical shoe now; toes are cleared from friction and pressure.  Instructed on diabetic shoes and that PCP would need to order those for her.  Communication sent to Dr. Maxwell regarding foot deformity and need for diabetic shoes.  Plan:  I will provide completed Certificate of Medical Necessity and order for Dr. Maxwell's signature.  Ms. Araiza can take those forms to her appt with Dr. Maxwell; then, we can send her to [facility] for fitting.      Hammertoes:  Severe deformity of right 2nd toe.    Being followed by Dr. Caballero for surgical off-loading eval.         Routine Foot Care:  Procedure Today:  cutting, filing, and buffing of 7 toenails.  Rationale:  Overgrown toenails, as patient presented today, can lead to diabetic foot/toe ulcers, osteomyelitis, and lower limb amputations.   Informed verbal consent obtained.  Using standard podiatry clippers, [place], and [place] boards, I cut and sanded 7 toenails.  No bleeding or discomfort during procedure.  Patient tolerated procedure without complications.      Class A Findings:  Amputated toe  Q7 - modifier          Return to clinic in one week.  Teaching reinforced on s/s to call wound clinic for promptly.

## 2022-11-03 ENCOUNTER — OFFICE VISIT (OUTPATIENT)
Dept: FAMILY MEDICINE | Facility: CLINIC | Age: 57
End: 2022-11-03
Payer: COMMERCIAL

## 2022-11-03 ENCOUNTER — HOSPITAL ENCOUNTER (OUTPATIENT)
Dept: WOUND CARE | Facility: HOSPITAL | Age: 57
Discharge: HOME | End: 2022-11-03
Attending: NURSE PRACTITIONER
Payer: COMMERCIAL

## 2022-11-03 ENCOUNTER — TELEPHONE (OUTPATIENT)
Dept: WOUND CARE | Facility: HOSPITAL | Age: 57
End: 2022-11-03

## 2022-11-03 VITALS
RESPIRATION RATE: 18 BRPM | BODY MASS INDEX: 28.89 KG/M2 | HEIGHT: 67 IN | WEIGHT: 184.06 LBS | SYSTOLIC BLOOD PRESSURE: 118 MMHG | HEART RATE: 91 BPM | TEMPERATURE: 98 F | DIASTOLIC BLOOD PRESSURE: 77 MMHG | OXYGEN SATURATION: 97 %

## 2022-11-03 VITALS — DIASTOLIC BLOOD PRESSURE: 70 MMHG | SYSTOLIC BLOOD PRESSURE: 158 MMHG | HEART RATE: 80 BPM | RESPIRATION RATE: 20 BRPM

## 2022-11-03 DIAGNOSIS — S81.002A OPEN WOUND OF LEFT KNEE, INITIAL ENCOUNTER: ICD-10-CM

## 2022-11-03 DIAGNOSIS — M20.41 ACQUIRED HAMMERTOES OF BOTH FEET: ICD-10-CM

## 2022-11-03 DIAGNOSIS — E11.628 TYPE 2 DIABETES MELLITUS WITH OTHER SKIN COMPLICATION, WITHOUT LONG-TERM CURRENT USE OF INSULIN: Primary | ICD-10-CM
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
## 2022-11-03 DIAGNOSIS — M21.961 ACQUIRED DEFORMITY OF RIGHT FOOT: ICD-10-CM

## 2022-11-03 DIAGNOSIS — S91.104A OPEN WOUND OF LESSER TOE OF RIGHT FOOT: Primary | ICD-10-CM

## 2022-11-03 DIAGNOSIS — Z87.39 HISTORY OF OSTEOMYELITIS: ICD-10-CM

## 2022-11-03 DIAGNOSIS — Z89.421 STATUS POST AMPUTATION OF LESSER TOE OF RIGHT FOOT: ICD-10-CM

## 2022-11-03 DIAGNOSIS — E11.628 TYPE 2 DIABETES MELLITUS WITH OTHER SKIN COMPLICATION, WITHOUT LONG-TERM CURRENT USE OF INSULIN: ICD-10-CM

## 2022-11-03 DIAGNOSIS — I10 PRIMARY HYPERTENSION: ICD-10-CM

## 2022-11-03 DIAGNOSIS — S61.205A OPEN WOUND OF LEFT RING FINGER: ICD-10-CM

## 2022-11-03 DIAGNOSIS — M20.42 ACQUIRED HAMMERTOES OF BOTH FEET: ICD-10-CM

## 2022-11-03 DIAGNOSIS — E78.5 HYPERLIPIDEMIA, UNSPECIFIED HYPERLIPIDEMIA TYPE: ICD-10-CM

## 2022-11-03 PROBLEM — E11.610 CHARCOT FOOT DUE TO DIABETES MELLITUS: Status: ACTIVE | Noted: 2022-11-03

## 2022-11-03 PROCEDURE — 99213 PR OFFICE/OUTPT VISIT, EST, LEVL III, 20-29 MIN: ICD-10-PCS | Mod: ,,, | Performed by: NURSE PRACTITIONER

## 2022-11-03 PROCEDURE — 99213 OFFICE O/P EST LOW 20 MIN: CPT | Mod: ,,, | Performed by: NURSE PRACTITIONER

## 2022-11-03 PROCEDURE — 99211 OFF/OP EST MAY X REQ PHY/QHP: CPT

## 2022-11-03 PROCEDURE — 99214 OFFICE O/P EST MOD 30 MIN: CPT | Mod: PBBFAC,27 | Performed by: NURSE PRACTITIONER

## 2022-11-03 RX ORDER — ATORVASTATIN CALCIUM 40 MG/1
40 TABLET, FILM COATED ORAL DAILY
Qty: 90 TABLET | Refills: 3 | Status: CANCELLED | OUTPATIENT
Start: 2022-11-03 | End: 2023-02-01

## 2022-11-03 NOTE — PROGRESS NOTES
Family Medicine Clinic Note:    PCP: Sherly Lawrence MD    Maribel Araiza is a 57 y.o. female with a history of HTN, GDM, osteomyelitis, right foot 3rd toe amputation (per recent hospitalization on 10/3/22) presents to clinic today for f/up regarding her diabetes. No complaints.      Subjective:   Type 2 diabetes  AM glucose: 125, 149, 156, 137, 154, 101, 135  PP glucose:131, 129, 132, 184, 158, 140, 134, 145  PM glucose: 141, 112, 136, 129, 155, 129, 172  On levemir 12 units qhs  On metformin  mg and 500 mg at night      HTN:  Bp today 118/77  Reviewed bp log-stable  On losartan 50 mg daily  No side effects reported    HLD  Reviewed lipid panel with patient  No family history of vascular disease   Reviewed labs with pt: TSH, vitamin D, B12, CBC, CMP    HM:Breast mammogram screening Bilat with Eric ordered 10/19/22 to due on 12/13/22    ROS    Respiratory: no trouble breathing or SOB  Cardiovascular: no chest pain or tightness, no SOB on activity, no ankle swelling  Gastrointestinal: no constipation, no diarrhea, no nausea, no vomiting  Musculoskeletal: no muscle pain, no joint pain        Current Outpatient Medications   Medication Sig Dispense Refill    blood sugar diagnostic Strp Check blood glucose in the morning and before each meal. 100 each 11    blood-glucose meter Misc Check blood glucose in the morning and before each meal. 1 each 1    insulin detemir U-100 (LEVEMIR) 100 unit/mL injection Inject 10 Units into the skin every evening. 6 mL 1    lancets (ACCU-CHEK MULTICLIX LANCET) Misc Check blood glucose in the morning and before each meal. 100 each 11    losartan (COZAAR) 50 MG tablet Take 1 tablet (50 mg total) by mouth once daily. 90 tablet 3    metFORMIN (GLUCOPHAGE-XR) 500 MG ER 24hr tablet Take 2 tablets (1,000 mg total) by mouth 2 (two) times daily with meals. 360 tablet 0     No current facility-administered medications for this visit.       Objective:     /77 (BP Location:  "Left arm, Patient Position: Sitting, BP Method: Medium (Automatic))   Pulse 91   Temp 98.1 °F (36.7 °C) (Oral)   Resp 18   Ht 5' 7" (1.702 m)   Wt 83.5 kg (184 lb 1.4 oz)   SpO2 97%   BMI 28.83 kg/m²   BP Readings from Last 3 Encounters:   11/03/22 118/77   11/03/22 (!) 158/70   10/28/22 134/81     Wt Readings from Last 3 Encounters:   11/03/22 83.5 kg (184 lb 1.4 oz)   10/19/22 83.5 kg (184 lb)   10/18/22 83.5 kg (184 lb)     No results found for this or any previous visit (from the past 200 hour(s)).  Body mass index is 28.83 kg/m².    Physical Exam  Constitutional: NAD  Lungs: CTAB, No crackles, No wheezes  Cardiovascular: Normal heart sounds, No MRG  Abdomen: Soft, Nontender, No palpable hepatosplenomegaly, No abdominal masses, No ascites  Extremities: No cyanosis, No clubbing, No edema    LABS: none      Assessment:   Maribel Araiza is a 57 y.o. female with:    1. Type 2 diabetes mellitus with other skin complication, without long-term current use of insulin    2. Primary hypertension    3. Hyperlipidemia, unspecified hyperlipidemia type    4. Charcot foot due to diabetes mellitus        Plan:   Type 2 diabetes mellitus  -last A1C 10.8 on 10/2/22  -Increase the metformin to 1000 mg BID  -Continue Levemir 12 units qhs.    -Record glucose and bring next visit  -Requesting refills on needles       Primary HTN  -Continue losartan 50 mg daily  -BP stable      HLD:  -ASCVD score 7.7%  and  a high intensity statin recommendation because of known diabetes and 10 year risk >7.5%.    -Recommend dietary modifications x 3 months  -Reviewed lipid panel   -Pt is not interested with starting the atorvastatin at this time. Informed of benefits and risks. Pt verbalized understanding.       HM:  HM:Breast mammogram screening Bilat with Eric ordered 10/19/22 scheduled for       RTC 2 weeks to begin transition to Ozympic       No follow-ups on file.    Future Appointments   Date Time Provider Department Center "   11/10/2022  8:30 AM TIM Perez [facility] OPWND [facility] Manuel   12/13/2022  8:45 AM [facility] MAMMO2 [facility] MAMMO Kristopher        Staff: Dr. Neftaly Maxwell MD  Family Medicine PGY-2

## 2022-11-03 NOTE — PROGRESS NOTES
TODAY'S VISIT NOTE WAS IMPORTED FROM LAST WOUND CLINIC VISIT OF 10/28/2022.  I ATTEST THAT I REVIEWED THE HPI, ROS, LABS, WOUND-RELATED IMAGING, PHYSICAL EXAMINATION, EVALUATION AND PLAN SECTIONS OF IMPORTED NOTE AND REVISED TODAY'S VISIT NOTE TO REFLECT TODAY'S NEW ASSESSMENT FINDINGS AND TODAY'S UPDATES TO WOUND TREATMENT PLAN.          CHIEF COMPLAINT:    Surgical wound to right foot.  Fell in [facility] parking lot to get to wound clinic visit.        HISTORY OF  PRESENT ILLNESS:  57 y.o. White female being seen today for follow-up of surgical wound to right 3rd toe.  Hospitalized @ [facility] 10/2/2022 - 10/7/2022, where she underwent amputation of right 3rd toe toe due to osteomyelitis; also initially septic with SIRS 2/4.  Diagnosed with Type 2 diabetes during hospitalization; she was started on insulin regimen, while inpatient.  Discharged home with basal Levemir 10 units nightly, along with Metformin ER 1000 mg.  Previously diagnosed with gestational diabetes.  Was also prescribed Losartan 50 mg daily, for newly diagnosed hypertension.  Discharged home with 4-day course of Bactrim to finish 10-day course, which was initiated inpatient.  Followed by Dr. Maxwell in [facility] for PCP, whom cared for Ms. Araiza during recent hospitalization.   Had not been followed by physician for years.  Current wound care: cleaning with Dakins 0.25%, followed by Vanc 5%, Tobra 4%, and Voricon 2%, followed by secondary dressing.  Being changed Q12H.  Followed by [facility].  History includes:        Past Medical History:   Diagnosis Date    Acquired deformity of right foot 10/10/2022    Acquired hammertoes of both feet 10/10/2022    History of osteomyelitis 10/10/2022    Open wound of lesser toe of right foot 10/10/2022    Primary hypertension 10/10/2022    Status post amputation of lesser toe of right foot 10/10/2022    Type 2 diabetes mellitus with skin complication, without long-term current use of insulin  10/10/2022      Past Surgical History:   Procedure Laterality Date    TOE AMPUTATION Right 10/5/2022    Procedure: AMPUTATION, right third toe;  Surgeon: Glen Roth MD;  Location: [facility];  Service: General;  Laterality: Right;      Social History     Socioeconomic History    Marital status:    Tobacco Use    Smoking status: Never    Smokeless tobacco: Never   Substance and Sexual Activity    Alcohol use: Not Currently    Drug use: Never       Review of Most Recent Wound Care-Related Labs:  Lab Results   Component Value Date/Time    WBC 5.3 10/07/2022 03:11 AM    RBC 4.28 10/07/2022 03:11 AM    HGB 12.9 10/07/2022 03:11 AM    HCT 37.3 10/07/2022 03:11 AM    MCV 87.1 10/07/2022 03:11 AM    MCH 30.1 10/07/2022 03:11 AM    CREATININE 0.58 10/07/2022 03:11 AM    HGBA1C 10.8 (H) 10/02/2022 01:18 PM    .00 (H) 10/02/2022 01:27 PM          Today 11/3/2022:  Fell this afternoon in hospital parking lot, when her knee walker hit a pebble.  Skinned up left knee and left ring finger.  Right knee is red; however, no skin breakdown.  Wounds are superficial.  Denies any joint pain; able to move all fingers in left hand; sensation intact to hand/fingers.  No residual pain in knees; denies need for xrays.  No reported complications with wound care or current treatment plan.  Has all wound care supplies in home.  Antibiotic powder is all in one container and was mixed by pharmacy.  Denies fevers, chills, wound odor, wound redness, wound pain, or yellow/green drainage.   Had MRI of right foot at [facility]; scheduled for f/u with Dr. Caballero Thursday of next week to review results and establish tx plan.  CBGs have been <200 consistently.  Scheduled to see Dr. Maxwell, PCP, after wound clinic visit today.     10/28/2022:  Evaluated by Dr. Caballero, podiatrist, yesterday.  He has ordered MRI of right foot.  Told her she had a Charcot foot, complicated by arthritis.  Unable to do tendon release on right 2nd  toe at bedside as contracture is too advanced.  Scheduled to see PCP, Dr. Maxwell, 11/3/2022 and will f/u on orthotic order for foot deformity shoe fabrication.  Did get her knee walker fixed and is back to using that for off-loading.  No reported complications with wound care or current treatment plan.  Has all wound care supplies in home.  Denies fevers, chills, wound odor, wound redness, wound pain, or yellow/green drainage.  No new rashes, lesions, or skin breakdown.  CBGs are ranging 140's prior to breakfasts.  Taking all medications, as prescribed; keeping a written journal for upcoming appt with PCP.     10/20/2022:  No longer feeling jittery; believes that was related to Bactrim.  Sutures removed by general surgery on 10/18/2022.  Surgery has signed off on case.  Wound doing well since then.  Has been off-loading foot with knee walker; however, wheel fell off earlier today.  Going to [employer] to get scooter fixed after today's visit.  Also using Darco surgical shoe.  No reported complications with wound care or current treatment plan.  Has all wound care supplies in home.  Denies fevers, chills, wound odor, wound redness, wound pain, or yellow/green drainage.  No new rashes, lesions, or skin breakdown.  Needs RX for diabetic shoes for left foot deformity and hammertoes.      10/10/2022:  Tolerating Bactrim without side effects.  Has a couple of doses remaining.  Using knee walker for off-loading right foot.  Current wound care:  cleaning with Vashe, followed by non-contact layer dressing, followed by secondary dressing.  Being done daily.  No new complaints with wound or current wound care regimen.  Denies fevers, chills, wound redness, wound pain, wound odors, or yellow/green drainage.   Feels nervous inside after taking Metformin in morning.  Not having and GI s/e with Metformin though.        REVIEW OF SYSTEMS:  Except as stated in HPI, all other 10 body systems normal.     Current Outpatient  "Medications   Medication Sig Dispense Refill    blood sugar diagnostic Strp Check blood glucose in the morning and before each meal. 100 each 11    blood-glucose meter Misc Check blood glucose in the morning and before each meal. 1 each 1    insulin detemir U-100 (LEVEMIR) 100 unit/mL injection Inject 10 Units into the skin every evening. 6 mL 1    lancets (ACCU-CHEK MULTICLIX LANCET) Misc Check blood glucose in the morning and before each meal. 100 each 11    losartan (COZAAR) 50 MG tablet Take 1 tablet (50 mg total) by mouth once daily. 90 tablet 3    metFORMIN (GLUCOPHAGE-XR) 500 MG ER 24hr tablet Take 2 tablets (1,000 mg total) by mouth 2 (two) times daily with meals. 360 tablet 0    pen needle, diabetic (BD ULTRA-FINE PAVITHRA PEN NEEDLE) 32 gauge x 5/32" Ndle 1 Bag by Misc.(Non-Drug; Combo Route) route 3 (three) times daily. 90 each 11     No current facility-administered medications for this encounter.         PHYSICAL EXAMINATION AND VITAL SIGNS:    Vitals:    11/03/22 1400   BP: (!) 158/70   Pulse: 80   Resp: 20     There is no height or weight on file to calculate BMI.      General:  Hypertensive with diabetes.  Well-nourished, in no acute distress.  Adult male with her.    Respiratory: Breathing even, quiet, and unlabored.  No coughing.  Cardiovascular:   Mild non-pitting edema to right dorsal and lateral foot.  No hemosiderin staining or varicosities.   Scattered hair over dorsal toes.  Evidence of shaving of legs.   Toenails well-groomed, with dystrophic changes.    Musculoskeletal:   Right 3rd toe amputation surgical incision.  Significant bony deformity of right foot with widened forefoot, high arch with prominent metatarsal heads, and severe 2nd hammertoe.  Hammertoes to left 2-4th toes; however, not as severe.    Neurologic:  A&O X 3.  Cranial nerves grossly intact.   Psychiatric:  Calm, cooperative.  Became tearful several times throughout today's visit, stating she is overwhelmed with all of her and " 's health issues.   Responses appropriate.   Integumentary:    Surgical wound to right 3rd toe space.    100% chucky, red, and mealy tissue.  Wound dimensions smaller from last visit.  Mild periwound erythema; no purulent drainage, periwound edema, odors, induration, bogginess, or fluctuance.              Incision/Site 10/10/22 0828 Right Toe, third anterior;midline (Active)   10/10/22 0828   Present Prior to Hospital Arrival?: Yes   Side: Right   Location: Toe, third   Orientation: anterior;midline   Incision Type:    Closure Method:    Additional Comments:    Removal Indication and Assessment:    Wound Outcome: Amputation   Removal Indications:    Wound Image   11/03/22 1400   Dressing Appearance Dried drainage 11/03/22 1400   Drainage Amount Small 11/03/22 1400   Drainage Characteristics/Odor Serosanguineous 11/03/22 1400   Appearance [place] 11/03/22 1400   Wound Length (cm) 1 cm 11/03/22 1400   Wound Width (cm) 0.7 cm 11/03/22 1400   Wound Depth (cm) 0.3 cm 11/03/22 1400   Wound Volume (cm^3) 0.21 cm^3 11/03/22 1400   Wound Surface Area (cm^2) 0.7 cm^2 11/03/22 1400                 ASSESSMENT AND PLAN:    Type 2 diabetes with skin complication:  S/P right 3rd toe amputation @ [facility] [place] on 10/5/2022 due to osteomyelitis.  Finished up 10-day course of Bactrim ordered upon hospital discharge, without side effects.   New dx of Type 2 diabetes, non-insulin dependent.   CBGs consistently <200, per self-report.  Scheduled to see PCP, Dr. Maxwell, later today.      Open wound right 3rd toe:  Responding well to current tx plan.  Slowed healing, which I suspect is due to microcirculatory dz secondary to undiagnosed diabetes.  Evidence of Charcot foot deformity; no telling how long she has had undiagnosed diabetes.  Sutures removed by surgery on 10/18/2022; surgery s/o on case.  DP and PT pulses dopplered bilaterally; no hx of smoking.  Is off-loading with a knee walker, which I encouraged her to continue, with rationale.   "  Wound Care Today/Continued Wound Care Tx Plan:  Clean with Dakins 0.25%, followed by topically CMPD Vanc 5%/Tobra 4%/Voricon 2, followed by secondary dressing.  To be done Q12H.      Right foot deformity:  Underwent MRI, with f/u visit with Dr. Caballero 11/11/2022.    She is agreeable to surgical intervention at this time, due to intact arterial flow in legs.   10/20/2022:Certificate of Medical Necessity and prescription for extra depth shoes and moldable inserts completed for Dr. Maxwell's signature.  Instructed Ms. Araiza on insurance rules for those DME products and encouraged her to have Dr. Maxwell sign and take to [facility].    10/10/22:  No reported trauma.  States "my arch fell during COVID and I wasn't going anywhere near a hospital."  Discussed role foot deformities play in development of DFU and importance of properly-fitting shoes.  She is wearing a Darco surgical shoe now; toes are cleared from friction and pressure.  Instructed on diabetic shoes and that PCP would need to order those for her.  Communication sent to Dr. Maxwell regarding foot deformity and need for diabetic shoes.  Plan:  I will provide completed Certificate of Medical Necessity and order for Dr. Maxwell's signature.  Ms. Araiza can take those forms to her appt with Dr. Maxwell; then, we can send her to [facility] for fitting.  Loss of intrinsic mucle ROM bilaterally.  Full dorsiflexion and ankles and hallux.  Decreased flexion of bilateral ankles.   Normal monofilament testing bilaterally; no LOPS.      Hammertoes:  Severe deformity of right 2nd toe.    Being followed by Dr. Caballero for surgical off-loading eval.         Routine Foot Care:  Last trimmed on  10/28/2022            Return to clinic in one week.  Teaching reinforced on s/s to call wound clinic for promptly.         "

## 2022-11-03 NOTE — TELEPHONE ENCOUNTER
Per [facility], patient's insurance did cover the Vanc 5%/Tobra 4%/Voricon 2% topically CMPD antibiotics all in one powder.

## 2022-11-04 RX ORDER — PEN NEEDLE, DIABETIC 30 GX3/16"
1 NEEDLE, DISPOSABLE MISCELLANEOUS 3 TIMES DAILY
Qty: 90 EACH | Refills: 11 | Status: SHIPPED | OUTPATIENT
Start: 2022-11-04 | End: 2023-02-14

## 2022-11-07 ENCOUNTER — TELEPHONE (OUTPATIENT)
Dept: FAMILY MEDICINE | Facility: CLINIC | Age: 57
End: 2022-11-07
Payer: COMMERCIAL

## 2022-11-07 NOTE — TELEPHONE ENCOUNTER
Need Rx. For Diabetic shoe partial custom insert, if for toe amputation.with Diagnosis and chart notes  [facility].  Thank you.

## 2022-11-10 ENCOUNTER — HOSPITAL ENCOUNTER (OUTPATIENT)
Dept: WOUND CARE | Facility: HOSPITAL | Age: 57
Discharge: HOME | End: 2022-11-10
Attending: NURSE PRACTITIONER
Payer: COMMERCIAL

## 2022-11-10 VITALS
RESPIRATION RATE: 20 BRPM | TEMPERATURE: 98 F | SYSTOLIC BLOOD PRESSURE: 115 MMHG | HEART RATE: 80 BPM | DIASTOLIC BLOOD PRESSURE: 79 MMHG

## 2022-11-10 DIAGNOSIS — S91.104A OPEN WOUND OF LESSER TOE OF RIGHT FOOT: Primary | ICD-10-CM

## 2022-11-10 DIAGNOSIS — M20.42 ACQUIRED HAMMERTOES OF BOTH FEET: ICD-10-CM

## 2022-11-10 DIAGNOSIS — M20.41 ACQUIRED HAMMERTOES OF BOTH FEET: ICD-10-CM

## 2022-11-10 DIAGNOSIS — M21.961 ACQUIRED DEFORMITY OF RIGHT FOOT: ICD-10-CM

## 2022-11-10 DIAGNOSIS — Z89.421 STATUS POST AMPUTATION OF LESSER TOE OF RIGHT FOOT: ICD-10-CM

## 2022-11-10 DIAGNOSIS — E11.628 TYPE 2 DIABETES MELLITUS WITH OTHER SKIN COMPLICATION, WITHOUT LONG-TERM CURRENT USE OF INSULIN: ICD-10-CM

## 2022-11-10 DIAGNOSIS — Z87.39 HISTORY OF OSTEOMYELITIS: ICD-10-CM

## 2022-11-10 PROCEDURE — 99213 PR OFFICE/OUTPT VISIT, EST, LEVL III, 20-29 MIN: ICD-10-PCS | Mod: ,,, | Performed by: NURSE PRACTITIONER

## 2022-11-10 PROCEDURE — 99211 OFF/OP EST MAY X REQ PHY/QHP: CPT

## 2022-11-10 PROCEDURE — 99213 OFFICE O/P EST LOW 20 MIN: CPT | Mod: ,,, | Performed by: NURSE PRACTITIONER

## 2022-11-10 NOTE — PROGRESS NOTES
TODAY'S VISIT NOTE WAS IMPORTED FROM LAST WOUND CLINIC VISIT OF 11/3/2022.  I ATTEST THAT I REVIEWED THE HPI, ROS, LABS, WOUND-RELATED IMAGING, PHYSICAL EXAMINATION, EVALUATION AND PLAN SECTIONS OF IMPORTED NOTE AND REVISED TODAY'S VISIT NOTE TO REFLECT TODAY'S NEW ASSESSMENT FINDINGS AND TODAY'S UPDATES TO WOUND TREATMENT PLAN.          CHIEF COMPLAINT:    Surgical wound to right foot.        HISTORY OF  PRESENT ILLNESS:  57 y.o. White female being seen today for follow-up of surgical wound to right 3rd toe.  Hospitalized @ [facility] 10/2/2022 - 10/7/2022, where she underwent amputation of right 3rd toe toe due to osteomyelitis; also initially septic with SIRS 2/4.  Diagnosed with Type 2 diabetes during hospitalization; she was started on insulin regimen, while inpatient.  Discharged home with basal Levemir 10 units nightly, along with Metformin ER 1000 mg.  Previously diagnosed with gestational diabetes.  Was also prescribed Losartan 50 mg daily, for newly diagnosed hypertension.  Discharged home with 4-day course of Bactrim to finish 10-day course, which was initiated inpatient.  Followed by Dr. Maxwell in [facility] for PCP, whom cared for Ms. Araiza during recent hospitalization.   Had not been followed by physician for years.  Current wound care: cleaning with Dakins 0.25%, followed by Vanc 5%, Tobra 4%, and Voricon 2%, followed by secondary dressing.  Being changed Q12H.  Followed by Dr. Caballero, podiatrist.  Followed by [facility].  History includes:        Past Medical History:   Diagnosis Date    Acquired deformity of right foot 10/10/2022    Acquired hammertoes of both feet 10/10/2022    History of osteomyelitis 10/10/2022    Open wound of lesser toe of right foot 10/10/2022    Primary hypertension 10/10/2022    Status post amputation of lesser toe of right foot 10/10/2022    Type 2 diabetes mellitus with skin complication, without long-term current use of insulin 10/10/2022      Past  Surgical History:   Procedure Laterality Date    TOE AMPUTATION Right 10/5/2022    Procedure: AMPUTATION, right third toe;  Surgeon: Glen Roth MD;  Location: [facility];  Service: General;  Laterality: Right;      Social History     Socioeconomic History    Marital status:    Tobacco Use    Smoking status: Never    Smokeless tobacco: Never   Substance and Sexual Activity    Alcohol use: Not Currently    Drug use: Never       Review of Most Recent Wound Care-Related Labs:  Lab Results   Component Value Date/Time    WBC 5.3 10/07/2022 03:11 AM    RBC 4.28 10/07/2022 03:11 AM    HGB 12.9 10/07/2022 03:11 AM    HCT 37.3 10/07/2022 03:11 AM    MCV 87.1 10/07/2022 03:11 AM    MCH 30.1 10/07/2022 03:11 AM    CREATININE 0.58 10/07/2022 03:11 AM    HGBA1C 10.8 (H) 10/02/2022 01:18 PM    .00 (H) 10/02/2022 01:27 PM          Today 11/10/2022:  Scheduled to see Dr. Caballero later today to discuss results of recent MRI.  No reported complications with wound care or current treatment plan.  Has all wound care supplies in home, including topically CMPD antibiotics.   Wounds to left knee and left ring finger are healed.  Denies fevers, chills, wound odor, wound redness, wound pain, or yellow/green drainage.  No new rashes, lesions, or skin breakdown.  Still off-loading with knee walker.  Wearing [employer] velcro shoe to right foot to prevent rubbing of 2nd toe, which is quite deformed with hammertoe.  Had a phone call from [employer] earlier today regarding shoe for right foot deformity.  She will wait to call them back until Dr. Caballero discusses MRI and his tx plan with her.   CBGs consistently <160's.      11/3/2022:  Fell this afternoon in hospital parking lot, when her knee walker hit a pebble.  Skinned up left knee and left ring finger.  Right knee is red; however, no skin breakdown.  Wounds are superficial.  Denies any joint pain; able to move all fingers in left hand; sensation intact to hand/fingers.   No residual pain in knees; denies need for xrays.  No reported complications with wound care or current treatment plan.  Has all wound care supplies in home.  Antibiotic powder is all in one container and was mixed by pharmacy.  Denies fevers, chills, wound odor, wound redness, wound pain, or yellow/green drainage.   Had MRI of right foot at [facility]; scheduled for f/u with Dr. Caballero Thursday of next week to review results and establish tx plan.  CBGs have been <200 consistently.  Scheduled to see Dr. Maxwell, PCP, after wound clinic visit today.     10/28/2022:  Evaluated by Dr. Caballero, podiatrist, yesterday.  He has ordered MRI of right foot.  Told her she had a Charcot foot, complicated by arthritis.  Unable to do tendon release on right 2nd toe at bedside as contracture is too advanced.  Scheduled to see PCP, Dr. Maxwell, 11/3/2022 and will f/u on orthotic order for foot deformity shoe fabrication.  Did get her knee walker fixed and is back to using that for off-loading.  No reported complications with wound care or current treatment plan.  Has all wound care supplies in home.  Denies fevers, chills, wound odor, wound redness, wound pain, or yellow/green drainage.  No new rashes, lesions, or skin breakdown.  CBGs are ranging 140's prior to breakfasts.  Taking all medications, as prescribed; keeping a written journal for upcoming appt with PCP.     10/20/2022:  No longer feeling jittery; believes that was related to Bactrim.  Sutures removed by general surgery on 10/18/2022.  Surgery has signed off on case.  Wound doing well since then.  Has been off-loading foot with knee walker; however, wheel fell off earlier today.  Going to [employer] to get scooter fixed after today's visit.  Also using [employer] surgical shoe.  No reported complications with wound care or current treatment plan.  Has all wound care supplies in home.  Denies fevers, chills, wound odor, wound redness, wound pain, or yellow/green  "drainage.  No new rashes, lesions, or skin breakdown.  Needs RX for diabetic shoes for left foot deformity and hammertoes.      10/10/2022:  Tolerating Bactrim without side effects.  Has a couple of doses remaining.  Using knee walker for off-loading right foot.  Current wound care:  cleaning with Vashe, followed by non-contact layer dressing, followed by secondary dressing.  Being done daily.  No new complaints with wound or current wound care regimen.  Denies fevers, chills, wound redness, wound pain, wound odors, or yellow/green drainage.   Feels nervous inside after taking Metformin in morning.  Not having and GI s/e with Metformin though.        REVIEW OF SYSTEMS:  Except as stated in HPI, all other 10 body systems normal.     Current Outpatient Medications   Medication Sig Dispense Refill    blood sugar diagnostic Strp Check blood glucose in the morning and before each meal. 100 each 11    blood-glucose meter Misc Check blood glucose in the morning and before each meal. 1 each 1    insulin detemir U-100 (LEVEMIR) 100 unit/mL injection Inject 10 Units into the skin every evening. 6 mL 1    lancets (ACCU-CHEK MULTICLIX LANCET) Misc Check blood glucose in the morning and before each meal. 100 each 11    losartan (COZAAR) 50 MG tablet Take 1 tablet (50 mg total) by mouth once daily. 90 tablet 3    metFORMIN (GLUCOPHAGE-XR) 500 MG ER 24hr tablet Take 2 tablets (1,000 mg total) by mouth 2 (two) times daily with meals. 360 tablet 0    pen needle, diabetic (BD ULTRA-FINE PAVITHRA PEN NEEDLE) 32 gauge x 5/32" Ndle 1 Bag by Misc.(Non-Drug; Combo Route) route 3 (three) times daily. 90 each 11     No current facility-administered medications for this encounter.         PHYSICAL EXAMINATION AND VITAL SIGNS:    Vitals:    11/10/22 0843   BP: 115/79   Pulse: 80   Resp: 20   Temp: 97.5 °F (36.4 °C)       There is no height or weight on file to calculate BMI.      General:  VSS, afebrile.  Well-nourished, in no acute distress.  " Adult son with her.    Respiratory: Breathing even, quiet, and unlabored.  No coughing.  Cardiovascular:   No peripheral edema.   No hemosiderin staining or varicosities.   Scattered hair over dorsal toes.  Evidence of shaving legs.   Toenails well-groomed, with dystrophic changes.    Musculoskeletal:   Right 3rd toe amputation surgical incision.  Significant bony deformity of right foot with widened forefoot, high arch with prominent metatarsal heads, and severe 2nd hammertoe.  Hammertoes to left 2-4th toes; however, not as severe.    Neurologic:  A&O X 3.  Cranial nerves grossly intact.   Psychiatric:  Calm, cooperative.    Responses appropriate.   Integumentary:      Surgical wound to right 3rd toe space.    Wound dimensions about the same as last week.  There was a light film of antibiotic debris over entire wound bed, which I was able to mechanically debride away with moistened gauze.  No periwound erythema; no purulent drainage, periwound edema, odors, induration, bogginess, or fluctuance.            Incision/Site 10/10/22 0828 Right Toe, third anterior;midline (Active)   10/10/22 0828   Present Prior to Hospital Arrival?: Yes   Side: Right   Location: Toe, third   Orientation: anterior;midline   Incision Type:    Closure Method:    Additional Comments:    Removal Indication and Assessment:    Wound Outcome: Amputation   Removal Indications:    Wound Image   11/10/22 0843   Dressing Appearance Moist drainage 11/10/22 0843   Drainage Amount Scant 11/10/22 0843   Drainage Characteristics/Odor Serosanguineous 11/10/22 0843   Appearance [place] 11/10/22 0843   Periwound Area Intact 11/10/22 0843   Wound Length (cm) 1.1 cm 11/10/22 0843   Wound Width (cm) 1 cm 11/10/22 0843   Wound Depth (cm) 0.2 cm 11/10/22 0843   Wound Volume (cm^3) 0.22 cm^3 11/10/22 0843   Wound Surface Area (cm^2) 1.1 cm^2 11/10/22 0843                           ASSESSMENT AND PLAN:    Type 2 diabetes with skin complication:  S/P right 3rd toe  amputation @ [facility] on 10/5/2022 due to osteomyelitis.  Finished up 10-day course of Bactrim ordered upon hospital discharge, without side effects.   New dx of Type 2 diabetes, non-insulin dependent.   Planned Ozempic in future, per PCP last visit note.  CBGs consistently <160, per self-report.        Open wound right 3rd toe:  No s/s of localized infection; however, wound healing stalled, which I suspect is due to microcirculatory dz secondary to chronically undiagnosed diabetes.  Evidence of Charcot foot deformity; no telling how long she has had undiagnosed diabetes.  Sutures removed by surgery on 10/18/2022; surgery s/o on case.  DP and PT pulses dopplered bilaterally; no hx of smoking.  Is off-loading with a knee walker, which I encouraged her to continue, with rationale.     Wound Care Today/Continued Wound Care Tx Plan:  Clean with Dakins 0.25%, followed by topically CMPD Vanc 5%/Tobra 4%/Voricon 2, followed by secondary dressing.  To be done Q12H.    Discussed cellular skin substitutes with her, rationale for, and expected outcomes.  Pending wound healing over next week, will plan to apply a sample cellular skin substitute to speed up wound healing.      Right foot deformity:  Underwent MRI, with f/u visit with Dr. Caballero scheduled later today.   She is agreeable to surgical intervention at this time, due to intact arterial flow in legs.  Has received a phone call from [employer] regarding fabrication of shoe for right Charcot foot; she wants to wait on Dr. Caballero's recommended tx plan before going to [employer] [facility].    Loss of intrinsic mucle ROM bilaterally.  Full dorsiflexion and ankles and hallux.  Decreased flexion of bilateral ankles.   Normal monofilament testing bilaterally; no LOPS.      Hammertoes:  Severe deformity of right 2nd toe.    Being followed by Dr. Caballero for surgical off-loading eval.         Routine Foot Care:  Last trimmed on  10/28/2022            Return to clinic in one  week.  Teaching reinforced on s/s to call wound clinic for promptly.

## 2022-11-11 DIAGNOSIS — E11.628 TYPE 2 DIABETES MELLITUS WITH OTHER SKIN COMPLICATION, WITH LONG-TERM CURRENT USE OF INSULIN: ICD-10-CM

## 2022-11-11 DIAGNOSIS — Z79.4 TYPE 2 DIABETES MELLITUS WITH OTHER SKIN COMPLICATION, WITH LONG-TERM CURRENT USE OF INSULIN: ICD-10-CM

## 2022-11-11 DIAGNOSIS — S98.131A AMPUTATION OF TOE OF RIGHT FOOT: Primary | ICD-10-CM

## 2022-11-15 ENCOUNTER — OFFICE VISIT (OUTPATIENT)
Dept: FAMILY MEDICINE | Facility: CLINIC | Age: 57
End: 2022-11-15
Payer: COMMERCIAL

## 2022-11-15 VITALS
TEMPERATURE: 98 F | HEIGHT: 67 IN | HEART RATE: 78 BPM | OXYGEN SATURATION: 98 % | WEIGHT: 184.06 LBS | SYSTOLIC BLOOD PRESSURE: 135 MMHG | DIASTOLIC BLOOD PRESSURE: 85 MMHG | RESPIRATION RATE: 18 BRPM | BODY MASS INDEX: 28.89 KG/M2

## 2022-11-15 DIAGNOSIS — E11.628 TYPE 2 DIABETES MELLITUS WITH OTHER SKIN COMPLICATION, WITHOUT LONG-TERM CURRENT USE OF INSULIN: Primary | ICD-10-CM

## 2022-11-15 DIAGNOSIS — I10 PRIMARY HYPERTENSION: ICD-10-CM

## 2022-11-15 LAB — HBA1C MFR BLD: 8.8 %

## 2022-11-15 PROCEDURE — 83036 HEMOGLOBIN GLYCOSYLATED A1C: CPT | Mod: PBBFAC

## 2022-11-15 PROCEDURE — 99214 OFFICE O/P EST MOD 30 MIN: CPT | Mod: PBBFAC

## 2022-11-15 RX ORDER — METFORMIN HYDROCHLORIDE 1000 MG/1
1000 TABLET, FILM COATED, EXTENDED RELEASE ORAL
Qty: 90 TABLET | Refills: 3 | Status: SHIPPED | OUTPATIENT
Start: 2022-11-15 | End: 2022-11-15

## 2022-11-15 RX ORDER — METFORMIN HYDROCHLORIDE 1000 MG/1
1000 TABLET, FILM COATED, EXTENDED RELEASE ORAL 2 TIMES DAILY WITH MEALS
Qty: 180 TABLET | Refills: 3 | Status: SHIPPED | OUTPATIENT
Start: 2022-11-15 | End: 2022-11-21 | Stop reason: SDUPTHER

## 2022-11-15 RX ORDER — SEMAGLUTIDE 1.34 MG/ML
0.25 INJECTION, SOLUTION SUBCUTANEOUS
Qty: 1 PEN | Refills: 5 | Status: SHIPPED | OUTPATIENT
Start: 2022-11-15 | End: 2022-11-21 | Stop reason: SDUPTHER

## 2022-11-16 NOTE — PROGRESS NOTES
Subjective:       Patient ID: Maribel Araiza is a 57 y.o. female.    Chief Complaint: Diabetes (Medication refill)    HPI    58 yo Diabetic pt, HTN, Right 3rd toe amputation wants to be off of Insulin. Current regimen includes Metformin 1000 mg BID XR 24 hours and 12 units of Insulin Levemir. CBG logs at home with fasting glucose < 100. Pt reports compliance with a diabetic diet. She would like to try Ozempic to help with DM and weight loss. Denies CP, SOB, fatigue, polyuria or polydipsia.    PCP Dr. Maxwell    Review of Systems    See above  Objective:      Vitals:    11/15/22 1516   BP: 135/85   Pulse: 78   Resp: 18   Temp: 98.2 °F (36.8 °C)     Physical Exam    Gen appearance: NAD  CV: RRR  Resp: Clear bs  Extremities: Right foot cast, able to move toes    Assessment:       Problem List Items Addressed This Visit          Cardiac/Vascular    Primary hypertension       Endocrine    Type 2 diabetes mellitus with skin complication, without long-term current use of insulin - Primary    Relevant Medications    semaglutide (OZEMPIC) 0.25 mg or 0.5 mg(2 mg/1.5 mL) pen injector    metFORMIN (GLUMETZA) 1000 MG (MOD) 24hr tablet         Plan:       1. Type 2 diabetes mellitus with other skin complication, without long-term current use of insulin  - Insulin 12 units has been stopped. Plan to start Ozempic 0.25 mg every 7 days for DM control and weight loss. Side effects and contraindications of Ozempic discussed. Continue Metformin 1000 mg BID ER.  Up-titrate Ozempic to a higher dosage if current dosage is not adequate for glucose control.   - POCT HEMOGLOBIN A1C 8.8 % in clinic on 11/15/22.  - Recommend to continue a glucose log (fasting and postprandial 2 hours)    2. Primary hypertension  - Stable . BP reviewed.  - Continue Losartan 50 mg tab daily      RTC 4 weeks follow-up DM

## 2022-11-17 ENCOUNTER — HOSPITAL ENCOUNTER (OUTPATIENT)
Dept: WOUND CARE | Facility: HOSPITAL | Age: 57
Discharge: HOME | End: 2022-11-17
Attending: NURSE PRACTITIONER
Payer: COMMERCIAL

## 2022-11-17 VITALS
TEMPERATURE: 97 F | HEART RATE: 85 BPM | SYSTOLIC BLOOD PRESSURE: 115 MMHG | DIASTOLIC BLOOD PRESSURE: 72 MMHG | RESPIRATION RATE: 20 BRPM

## 2022-11-17 DIAGNOSIS — E11.628 TYPE 2 DIABETES MELLITUS WITH OTHER SKIN COMPLICATION, WITHOUT LONG-TERM CURRENT USE OF INSULIN: ICD-10-CM

## 2022-11-17 DIAGNOSIS — M20.41 ACQUIRED HAMMERTOES OF BOTH FEET: ICD-10-CM

## 2022-11-17 DIAGNOSIS — Z89.421 STATUS POST AMPUTATION OF LESSER TOE OF RIGHT FOOT: ICD-10-CM

## 2022-11-17 DIAGNOSIS — Z87.39 HISTORY OF OSTEOMYELITIS: ICD-10-CM

## 2022-11-17 DIAGNOSIS — M21.961 ACQUIRED DEFORMITY OF RIGHT FOOT: ICD-10-CM

## 2022-11-17 DIAGNOSIS — S91.104A OPEN WOUND OF LESSER TOE OF RIGHT FOOT: Primary | ICD-10-CM

## 2022-11-17 DIAGNOSIS — M20.42 ACQUIRED HAMMERTOES OF BOTH FEET: ICD-10-CM

## 2022-11-17 PROCEDURE — 99211 OFF/OP EST MAY X REQ PHY/QHP: CPT

## 2022-11-17 PROCEDURE — 99213 PR OFFICE/OUTPT VISIT, EST, LEVL III, 20-29 MIN: ICD-10-PCS | Mod: ,,, | Performed by: NURSE PRACTITIONER

## 2022-11-17 PROCEDURE — 99213 OFFICE O/P EST LOW 20 MIN: CPT | Mod: ,,, | Performed by: NURSE PRACTITIONER

## 2022-11-17 NOTE — PROGRESS NOTES
TODAY'S VISIT NOTE WAS IMPORTED FROM LAST WOUND CLINIC VISIT OF 11/10/2022.  I ATTEST THAT I REVIEWED THE HPI, ROS, LABS, WOUND-RELATED IMAGING, PHYSICAL EXAMINATION, EVALUATION AND PLAN SECTIONS OF IMPORTED NOTE AND REVISED TODAY'S VISIT NOTE TO REFLECT TODAY'S NEW ASSESSMENT FINDINGS AND TODAY'S UPDATES TO WOUND TREATMENT PLAN.          CHIEF COMPLAINT:    Surgical wound to right foot.        HISTORY OF  PRESENT ILLNESS:  57 y.o. White female being seen today for follow-up of surgical wound to right 3rd toe.  Hospitalized @ [facility] 10/2/2022 - 10/7/2022, where she underwent amputation of right 3rd toe toe due to osteomyelitis; also initially septic with SIRS 2/4.  Diagnosed with Type 2 diabetes during hospitalization; she was started on insulin regimen, while inpatient.  Discharged home with basal Levemir 10 units nightly, along with Metformin ER 1000 mg.  Previously diagnosed with gestational diabetes.  HgbA1C 8.8 on 11/15/2022.  Was also prescribed Losartan 50 mg daily, for newly diagnosed hypertension.  Discharged home with 4-day course of Bactrim to finish 10-day course, which was initiated inpatient.  Followed by Dr. Maxwell in [facility] for PCP, whom cared for Ms. Araiza during recent hospitalization.   Had not been followed by physician for years.  Current wound care: cleaning with Dakins 0.25%, followed by Vanc 5%, Tobra 4%, and Voricon 2%, followed by secondary dressing.  Being changed Q12H.  Followed by Dr. Caballero, podiatrist, for left foot/toe deformities.  Followed by [facility].  History includes:        Past Medical History:   Diagnosis Date    Acquired deformity of right foot 10/10/2022    Acquired hammertoes of both feet 10/10/2022    History of osteomyelitis 10/10/2022    Open wound of lesser toe of right foot 10/10/2022    Primary hypertension 10/10/2022    Status post amputation of lesser toe of right foot 10/10/2022    Type 2 diabetes mellitus with skin complication, without  long-term current use of insulin 10/10/2022      Past Surgical History:   Procedure Laterality Date    TOE AMPUTATION Right 10/5/2022    Procedure: AMPUTATION, right third toe;  Surgeon: Glen Roth MD;  Location: [facility];  Service: General;  Laterality: Right;      Social History     Socioeconomic History    Marital status:    Tobacco Use    Smoking status: Never    Smokeless tobacco: Never   Substance and Sexual Activity    Alcohol use: Not Currently    Drug use: Never       Review of Most Recent Wound Care-Related Labs:  Lab Results   Component Value Date/Time    WBC 5.3 10/07/2022 03:11 AM    RBC 4.28 10/07/2022 03:11 AM    HGB 12.9 10/07/2022 03:11 AM    HCT 37.3 10/07/2022 03:11 AM    MCV 87.1 10/07/2022 03:11 AM    MCH 30.1 10/07/2022 03:11 AM    CREATININE 0.58 10/07/2022 03:11 AM    HGBA1C 8.8 11/15/2022 04:10 PM    .00 (H) 10/02/2022 01:27 PM          Today 11/17/2022:  Dr. Caballero debrided wound last week, after appointment in wound clinic with me.  Instructed to continue with topically CMPD antibiotics, which she has been doing.  Dr. Caballero relayed she did not have diabetic charcot foot; however, had significant arthritic changes in right foot.  Plans to do tendon surgery on right 2nd toe, after surgical wound heals; no surgery planned on foot though.  To f/u with Dr. Caballero in three months.  Plans on returning to work after the new year.   being discharged from [facility] in two weeks, following significant CVA.  No reported complications with wound care or current treatment plan.  Has all wound care supplies in home.  Denies fevers, chills, wound odor, wound redness, wound pain, or yellow/green drainage.  No new rashes, lesions, or skin breakdown.     11/10/2022:  Scheduled to see Dr. Caballero later today to discuss results of recent MRI.  No reported complications with wound care or current treatment plan.  Has all wound care supplies in home, including topically CMPD  antibiotics.   Wounds to left knee and left ring finger are healed.  Denies fevers, chills, wound odor, wound redness, wound pain, or yellow/green drainage.  No new rashes, lesions, or skin breakdown.  Still off-loading with knee walker.  Wearing Darco velcro shoe to right foot to prevent rubbing of 2nd toe, which is quite deformed with hammertoe.  Had a phone call from [employer] earlier today regarding shoe for right foot deformity.  She will wait to call them back until Dr. Caballero discusses MRI and his tx plan with her.   CBGs consistently <160's.      11/3/2022:  Fell this afternoon in hospital parking lot, when her knee walker hit a pebble.  Skinned up left knee and left ring finger.  Right knee is red; however, no skin breakdown.  Wounds are superficial.  Denies any joint pain; able to move all fingers in left hand; sensation intact to hand/fingers.  No residual pain in knees; denies need for xrays.  No reported complications with wound care or current treatment plan.  Has all wound care supplies in home.  Antibiotic powder is all in one container and was mixed by pharmacy.  Denies fevers, chills, wound odor, wound redness, wound pain, or yellow/green drainage.   Had MRI of right foot at [facility]; scheduled for f/u with Dr. Caballero Thursday of next week to review results and establish tx plan.  CBGs have been <200 consistently.  Scheduled to see Dr. Maxwell, PCP, after wound clinic visit today.     10/28/2022:  Evaluated by Dr. Caballero, podiatrist, yesterday.  He has ordered MRI of right foot.  Told her she had a Charcot foot, complicated by arthritis.  Unable to do tendon release on right 2nd toe at bedside as contracture is too advanced.  Scheduled to see PCP, Dr. Maxwell, 11/3/2022 and will f/u on orthotic order for foot deformity shoe fabrication.  Did get her knee walker fixed and is back to using that for off-loading.  No reported complications with wound care or current treatment  plan.  Has all wound care supplies in home.  Denies fevers, chills, wound odor, wound redness, wound pain, or yellow/green drainage.  No new rashes, lesions, or skin breakdown.  CBGs are ranging 140's prior to breakfasts.  Taking all medications, as prescribed; keeping a written journal for upcoming appt with PCP.     10/20/2022:  No longer feeling jittery; believes that was related to Bactrim.  Sutures removed by general surgery on 10/18/2022.  Surgery has signed off on case.  Wound doing well since then.  Has been off-loading foot with knee walker; however, wheel fell off earlier today.  Going to [employer] to get scooter fixed after today's visit.  Also using [email or web address].VN surgical shoe.  No reported complications with wound care or current treatment plan.  Has all wound care supplies in home.  Denies fevers, chills, wound odor, wound redness, wound pain, or yellow/green drainage.  No new rashes, lesions, or skin breakdown.  Needs RX for diabetic shoes for left foot deformity and hammertoes.      10/10/2022:  Tolerating Bactrim without side effects.  Has a couple of doses remaining.  Using knee walker for off-loading right foot.  Current wound care:  cleaning with Vashe, followed by non-contact layer dressing, followed by secondary dressing.  Being done daily.  No new complaints with wound or current wound care regimen.  Denies fevers, chills, wound redness, wound pain, wound odors, or yellow/green drainage.   Feels nervous inside after taking Metformin in morning.  Not having and GI s/e with Metformin though.        REVIEW OF SYSTEMS:  Except as stated in HPI, all other 10 body systems normal.     Current Outpatient Medications   Medication Sig Dispense Refill    blood sugar diagnostic Strp Check blood glucose in the morning and before each meal. 100 each 11    blood-glucose meter Misc Check blood glucose in the morning and before each meal. 1 each 1    lancets (ACCU-CHEK MULTICLIX LANCET) Misc Check blood glucose in  "the morning and before each meal. 100 each 11    losartan (COZAAR) 50 MG tablet Take 1 tablet (50 mg total) by mouth once daily. 90 tablet 3    metFORMIN (GLUMETZA) 1000 MG (MOD) 24hr tablet Take 1 tablet (1,000 mg total) by mouth 2 (two) times daily with meals. 180 tablet 3    pen needle, diabetic (BD ULTRA-FINE PAVITHRA PEN NEEDLE) 32 gauge x 5/32" Ndle 1 Bag by Misc.(Non-Drug; Combo Route) route 3 (three) times daily. 90 each 11    semaglutide (OZEMPIC) 0.25 mg or 0.5 mg(2 mg/1.5 mL) pen injector Inject 0.25 mg into the skin every 7 days. 1 pen 5     No current facility-administered medications for this encounter.         PHYSICAL EXAMINATION AND VITAL SIGNS:    Vitals:    11/17/22 1007   BP: 115/72   Pulse: 85   Resp: 20   Temp: 97.1 °F (36.2 °C)       There is no height or weight on file to calculate BMI.      General:  VSS, afebrile.  Well-nourished, in no acute distress.    Respiratory: Breathing even, quiet, and unlabored.  No coughing.  Cardiovascular:   No peripheral edema.   No hemosiderin staining or varicosities.   Scattered hair over dorsal toes.  Evidence of shaving legs.   Toenails well-groomed, with dystrophic changes.    Musculoskeletal:   Right 3rd toe amputation surgical incision.  Significant bony deformity of right foot with widened forefoot, high arch with prominent metatarsal heads, and severe 2nd hammertoe.  Hammertoes to left 2-4th toes; however, not as severe.    Neurologic:  A&O X 3.  Cranial nerves grossly intact.   Psychiatric:  Calm, cooperative.    Responses appropriate.   Integumentary:      Surgical wound to right 3rd toe space.    Wound dimensions slightly smaller from last week.  Increased granulation along medial aspect of wound margin.  Mild periwound erythema noted today; no purulent drainage, periwound edema, odors, induration, bogginess, or fluctuance.            Incision/Site 10/10/22 0828 Right Toe, third anterior;midline (Active)   10/10/22 0828   Present Prior to Hospital " Arrival?: Yes   Side: Right   Location: Toe, third   Orientation: anterior;midline   Incision Type:    Closure Method:    Additional Comments:    Removal Indication and Assessment:    Wound Outcome: Amputation   Removal Indications:    Wound Image   11/17/22 [facility]   Dressing Appearance Moist drainage 11/17/22 [facility]   Drainage Amount Small 11/17/22 [facility]   Drainage Characteristics/Odor Serosanguineous 11/17/22 1007   Appearance Slough 11/17/22 1007   Periwound Area Redness 11/17/22 [facility]   Wound Length (cm) 0.8 cm 11/17/22 [facility]   Wound Width (cm) 1.1 cm 11/17/22 [facility]   Wound Depth (cm) 0.3 cm 11/17/22 [facility]   Wound Volume (cm^3) 0.264 cm^3 11/17/22 [facility]   Wound Surface Area (cm^2) 0.88 cm^2 11/17/22 [facility]   Care Antimicrobial agent 11/17/22 [facility]                       ASSESSMENT AND PLAN:    Type 2 diabetes with skin complication:  S/P right 3rd toe amputation @ [facility] on 10/5/2022 due to osteomyelitis.  Finished up 10-day course of Bactrim ordered upon hospital discharge, without side effects.   New dx of Type 2 diabetes, non-insulin dependent.   Planned Ozempic in future, per PCP last visit note.  CBGs consistently <160, per self-report.    Last HgbA1C 8.8 on 11/15/2022.      Open wound right 3rd toe:  Arterial flow intact.  S/P bedside debridement by Dr. Caballero on 11/10/2022 (most likely surgical).   DP and PT pulses dopplered bilaterally; no hx of smoking.  Is off-loading with a knee walker, which I encouraged her to continue, with rationale.     Wound Care Today/Continued Wound Care Tx Plan:  Clean with Dakins 0.25%, followed by topically CMPD Vanc 5%/Tobra 4%/Voricon 2, followed by secondary dressing.  To be done Q12H.    I am off for Thanksgiving holiday beginning Wed-Fri of next week.  Will have her continue topical antibiotics for next 10 days, with planned skin substitute at her next visit, pending insurance authorization.      Right foot deformity:  Recent MRI ruled out Charcot foot, per Dr. Caballero, podiatry.   Foot with significant arthritic bony deformities.   Loss of intrinsic mucle ROM bilaterally.  Full dorsiflexion of ankles and hallux.  Decreased flexion of bilateral ankles.   Normal monofilament testing bilaterally; no LOPS.      Hammertoes:  Severe deformity of right 2nd toe.    Being followed by Dr. Caballero for surgical off-loading eval.         Routine Foot Care:  Last trimmed on  10/28/2022            Return to clinic in two weeks.  Will work on PA for cellular skin substitute between now and then.  She indicated she has an HSA, which she would be able to use for co-pay for skin substitute.  Teaching reinforced on s/s to call wound clinic for promptly.

## 2022-11-21 RX ORDER — SEMAGLUTIDE 1.34 MG/ML
0.25 INJECTION, SOLUTION SUBCUTANEOUS
Qty: 1 PEN | Refills: 5 | Status: SHIPPED | OUTPATIENT
Start: 2022-11-21 | End: 2023-02-14 | Stop reason: SDUPTHER

## 2022-11-21 RX ORDER — METFORMIN HYDROCHLORIDE 1000 MG/1
1000 TABLET, FILM COATED, EXTENDED RELEASE ORAL 2 TIMES DAILY WITH MEALS
Qty: 180 TABLET | Refills: 3 | Status: SHIPPED | OUTPATIENT
Start: 2022-11-21 | End: 2023-05-15 | Stop reason: SDUPTHER

## 2022-11-22 NOTE — PROGRESS NOTES
11/21/22    [facility] pharmacy having trouble to update Provider information. Patient wants medications sent to [facility] pharmacy.       ADRIANA

## 2022-12-01 ENCOUNTER — HOSPITAL ENCOUNTER (OUTPATIENT)
Dept: WOUND CARE | Facility: HOSPITAL | Age: 57
Discharge: HOME | End: 2022-12-01
Attending: NURSE PRACTITIONER
Payer: COMMERCIAL

## 2022-12-01 VITALS
TEMPERATURE: 98 F | HEART RATE: 101 BPM | DIASTOLIC BLOOD PRESSURE: 81 MMHG | RESPIRATION RATE: 18 BRPM | SYSTOLIC BLOOD PRESSURE: 132 MMHG

## 2022-12-01 DIAGNOSIS — Z87.39 HISTORY OF OSTEOMYELITIS: ICD-10-CM

## 2022-12-01 DIAGNOSIS — L84 CALLUS OF FOOT: ICD-10-CM

## 2022-12-01 DIAGNOSIS — M20.41 ACQUIRED HAMMERTOES OF BOTH FEET: ICD-10-CM

## 2022-12-01 DIAGNOSIS — M79.671 RIGHT FOOT PAIN: ICD-10-CM

## 2022-12-01 DIAGNOSIS — E11.628 TYPE 2 DIABETES MELLITUS WITH OTHER SKIN COMPLICATION, WITHOUT LONG-TERM CURRENT USE OF INSULIN: ICD-10-CM

## 2022-12-01 DIAGNOSIS — Z89.421 STATUS POST AMPUTATION OF LESSER TOE OF RIGHT FOOT: ICD-10-CM

## 2022-12-01 DIAGNOSIS — S91.104A OPEN WOUND OF LESSER TOE OF RIGHT FOOT: Primary | ICD-10-CM

## 2022-12-01 DIAGNOSIS — L85.3 XEROSIS OF SKIN: ICD-10-CM

## 2022-12-01 DIAGNOSIS — M20.42 ACQUIRED HAMMERTOES OF BOTH FEET: ICD-10-CM

## 2022-12-01 DIAGNOSIS — M21.961 ACQUIRED DEFORMITY OF RIGHT FOOT: ICD-10-CM

## 2022-12-01 PROCEDURE — 11055 PARING/CUTG B9 HYPRKER LES 1: CPT | Mod: ,,, | Performed by: NURSE PRACTITIONER

## 2022-12-01 PROCEDURE — 99213 OFFICE O/P EST LOW 20 MIN: CPT | Mod: 25,,, | Performed by: NURSE PRACTITIONER

## 2022-12-01 PROCEDURE — 11055 PARING/CUTG B9 HYPRKER LES 1: CPT

## 2022-12-01 PROCEDURE — 11055 PR TRIM HYPERKERATOTIC SKIN LESION, ONE: ICD-10-PCS | Mod: ,,, | Performed by: NURSE PRACTITIONER

## 2022-12-01 PROCEDURE — 99213 PR OFFICE/OUTPT VISIT, EST, LEVL III, 20-29 MIN: ICD-10-PCS | Mod: 25,,, | Performed by: NURSE PRACTITIONER

## 2022-12-01 PROCEDURE — 11056 PARNG/CUTG B9 HYPRKR LES 2-4: CPT

## 2022-12-01 PROCEDURE — 99211 OFF/OP EST MAY X REQ PHY/QHP: CPT

## 2022-12-01 RX ORDER — AMMONIUM LACTATE 12 G/100G
1 CREAM TOPICAL 2 TIMES DAILY
Qty: 385 G | Refills: 11 | Status: SHIPPED | OUTPATIENT
Start: 2022-12-01 | End: 2022-12-31

## 2022-12-01 NOTE — PROGRESS NOTES
TODAY'S VISIT NOTE WAS IMPORTED FROM LAST WOUND CLINIC VISIT OF 11/17/2022.  I ATTEST THAT I REVIEWED THE HPI, ROS, LABS, WOUND-RELATED IMAGING, PHYSICAL EXAMINATION, EVALUATION AND PLAN SECTIONS OF IMPORTED NOTE AND REVISED TODAY'S VISIT NOTE TO REFLECT TODAY'S NEW ASSESSMENT FINDINGS AND TODAY'S UPDATES TO WOUND TREATMENT PLAN.          CHIEF COMPLAINT:    Surgical wound to right foot.    New pain in bottom of right foot.      HISTORY OF  PRESENT ILLNESS:  57 y.o. White female being seen today for follow-up of surgical wound to right 3rd toe.  Hospitalized @ [facility] 10/2/2022 - 10/7/2022, where she underwent amputation of right 3rd toe toe due to osteomyelitis; also initially septic with SIRS 2/4.  Diagnosed with Type 2 diabetes during hospitalization; she was started on insulin regimen, while inpatient.  Insulin was discontinued by PCP recently, and she is now prescribed Ozempic, along with Metformin ER 1000 mg.  HgbA1C 8.8 on 11/15/2022.  Followed by Dr. Maxwell in [facility] for PCP, whom cared for Ms. Araiza during recent hospitalization.   Had not been followed by physician for years.  Current wound care: cleaning with Dakins 0.25%, followed by Vanc 5%, Tobra 4%, and Voricon 2%, followed by secondary dressing.  Being changed Q12H.  Followed by Dr. Caballero, podiatrist, for left foot/toe deformities.  Has been referred to [facility] for evaluation and treatment of right foot for brace versus AFO.  Followed by [facility].  History includes:        Past Medical History:   Diagnosis Date    Acquired deformity of right foot 10/10/2022    Acquired hammertoes of both feet 10/10/2022    History of osteomyelitis 10/10/2022    Open wound of lesser toe of right foot 10/10/2022    Primary hypertension 10/10/2022    Status post amputation of lesser toe of right foot 10/10/2022    Type 2 diabetes mellitus with skin complication, without long-term current use of insulin 10/10/2022      Past Surgical  History:   Procedure Laterality Date    TOE AMPUTATION Right 10/5/2022    Procedure: AMPUTATION, right third toe;  Surgeon: Glen Roth MD;  Location: [facility];  Service: General;  Laterality: Right;      Social History     Socioeconomic History    Marital status:    Tobacco Use    Smoking status: Never    Smokeless tobacco: Never   Substance and Sexual Activity    Alcohol use: Not Currently    Drug use: Never       Review of Most Recent Wound Care-Related Labs:  Lab Results   Component Value Date/Time    WBC 5.3 10/07/2022 03:11 AM    RBC 4.28 10/07/2022 03:11 AM    HGB 12.9 10/07/2022 03:11 AM    HCT 37.3 10/07/2022 03:11 AM    MCV 87.1 10/07/2022 03:11 AM    MCH 30.1 10/07/2022 03:11 AM    CREATININE 0.58 10/07/2022 03:11 AM    HGBA1C 8.8 11/15/2022 04:10 PM    .00 (H) 10/02/2022 01:27 PM          Today 12/1/2022:  Picking up  from [facility] later today; is concerned how increased task of his care will impact wound healing.  Hoping she can return to work at beginning of 2023, if wound heals by then.  Right foot wound has healed quite a bit since last visit; she is pleased about that.  Has not been sanding down large callus on bottom of right foot, due to open wound on foot.  No reported complications with wound care or current treatment plan.  Has all wound care supplies in home.  Denies fevers, chills, wound odor, wound redness, wound pain, or yellow/green drainage.  No new rashes, lesions, or skin breakdown.  Will call [facility] to f/u on orthotic evaluation.      11/17/2022:  Dr. Caballero debrided wound last week, after appointment in wound clinic with me.  Instructed to continue with topically CMPD antibiotics, which she has been doing.  Dr. Caballero relayed she did not have diabetic charcot foot; however, had significant arthritic changes in right foot.  Plans to do tendon surgery on right 2nd toe, after surgical wound heals; no surgery planned on foot though.  To f/u with   [facility] in three months.  Plans on returning to work after the new year.   being discharged from [facility] in two weeks, following significant CVA.  No reported complications with wound care or current treatment plan.  Has all wound care supplies in home.  Denies fevers, chills, wound odor, wound redness, wound pain, or yellow/green drainage.  No new rashes, lesions, or skin breakdown.     11/10/2022:  Scheduled to see Dr. Caballero later today to discuss results of recent MRI.  No reported complications with wound care or current treatment plan.  Has all wound care supplies in home, including topically CMPD antibiotics.   Wounds to left knee and left ring finger are healed.  Denies fevers, chills, wound odor, wound redness, wound pain, or yellow/green drainage.  No new rashes, lesions, or skin breakdown.  Still off-loading with knee walker.  Wearing Darco velcro shoe to right foot to prevent rubbing of 2nd toe, which is quite deformed with hammertoe.  Had a phone call from [facility] earlier today regarding shoe for right foot deformity.  She will wait to call them back until Dr. Caballero discusses MRI and his tx plan with her.   CBGs consistently <160's.      11/3/2022:  Fell this afternoon in hospital parking lot, when her knee walker hit a pebble.  Skinned up left knee and left ring finger.  Right knee is red; however, no skin breakdown.  Wounds are superficial.  Denies any joint pain; able to move all fingers in left hand; sensation intact to hand/fingers.  No residual pain in knees; denies need for xrays.  No reported complications with wound care or current treatment plan.  Has all wound care supplies in home.  Antibiotic powder is all in one container and was mixed by pharmacy.  Denies fevers, chills, wound odor, wound redness, wound pain, or yellow/green drainage.   Had MRI of right foot at [facility]; scheduled for f/u with Dr. Caballero Thursday of next week to review results and establish tx  "plan.  CBGs have been <200 consistently.  Scheduled to see Dr. Maxwell, PCP, after wound clinic visit today.         REVIEW OF SYSTEMS:  Except as stated in HPI, all other 10 body systems normal.       Current Outpatient Medications   Medication Sig Dispense Refill    blood sugar diagnostic Strp Check blood glucose in the morning and before each meal. 100 each 11    blood-glucose meter Misc Check blood glucose in the morning and before each meal. 1 each 1    lancets (ACCU-CHEK MULTICLIX LANCET) Misc Check blood glucose in the morning and before each meal. 100 each 11    losartan (COZAAR) 50 MG tablet Take 1 tablet (50 mg total) by mouth once daily. 90 tablet 3    metFORMIN (GLUMETZA) 1000 MG (MOD) 24hr tablet Take 1 tablet (1,000 mg total) by mouth 2 (two) times daily with meals. 180 tablet 3    pen needle, diabetic (BD ULTRA-FINE PAVITHRA PEN NEEDLE) 32 gauge x 5/32" Ndle 1 Bag by Misc.(Non-Drug; Combo Route) route 3 (three) times daily. 90 each 11    semaglutide (OZEMPIC) 0.25 mg or 0.5 mg(2 mg/1.5 mL) pen injector Inject 0.25 mg into the skin every 7 days. 1 pen 5    ammonium lactate 12 % Crea Apply 1 application topically 2 (two) times a day. Apply thin layer of Vaseline over Lac-Hydrin twice a day.  NOTHING BETWEEN TOES. 385 g 11     No current facility-administered medications for this encounter.         PHYSICAL EXAMINATION AND VITAL SIGNS:    Vitals:    12/01/22 0856   BP: 132/81   Pulse: 101   Resp: 18   Temp: 97.7 °F (36.5 °C)       There is no height or weight on file to calculate BMI.      General:  VSS.  Well-nourished, in no acute distress.    Respiratory: Breathing even, quiet, and unlabored.  No coughing.  Cardiovascular:   No peripheral edema.   No hemosiderin staining or varicosities.   Scattered hair over dorsal toes.  Evidence of shaving legs.   Toenails well-groomed, with dystrophic changes.    Musculoskeletal:   Right 3rd toe amputation surgical incision.  Significant bony deformity of right foot " with widened forefoot, high arch with prominent metatarsal heads, and severe 2nd hammertoe.  Hammertoes to left 2-4th toes; however, not as severe.    Neurologic:  A&O X 3.  Cranial nerves grossly intact.   Psychiatric:  Calm, cooperative.    Responses appropriate.   Integumentary:      Surgical wound to right 3rd toe space.    Wound dimensions smaller from last week.  100% granulation tissue.  No periwound erythema, purulent drainage, periwound edema, odors, induration, bogginess, or fluctuance.     Large callus over right plantar 1st met head:  Thickened, yellow about about the size of a quarter.  No skin breakdown under callus with shaving.             Incision/Site 10/10/22 0828 Right Toe, third anterior;midline (Active)   10/10/22 0828   Present Prior to Hospital Arrival?: Yes   Side: Right   Location: Toe, third   Orientation: anterior;midline   Incision Type:    Closure Method:    Additional Comments:    Removal Indication and Assessment:    Wound Outcome: Amputation   Removal Indications:    Wound Image   12/01/22 0857   Dressing Appearance Dry 12/01/22 0857   Drainage Amount None 12/01/22 0857   Wound Length (cm) 0.63 cm 12/01/22 0857   Wound Width (cm) 0.3 cm 12/01/22 0857   Wound Depth (cm) 0.2 cm 12/01/22 0857   Wound Volume (cm^3) 0.0378 cm^3 12/01/22 0857   Wound Surface Area (cm^2) 0.189 cm^2 12/01/22 0857   Care Cleansed with:;Antimicrobial agent 12/01/22 0857   Dressing Removed 12/01/22 0857                 ASSESSMENT AND PLAN:    Type 2 diabetes with skin complication:  S/P right 3rd toe amputation @ [facility] on 10/5/2022 due to osteomyelitis.  Finished up 10-day course of Bactrim ordered upon hospital discharge, without side effects.   New dx of Type 2 diabetes, non-insulin dependent.   Insulin discontinued and replaced with Ozempic.  CBGs consistently <160, per self-report.    Last HgbA1C 8.8 on 11/15/2022.      Open wound right 3rd toe:  Arterial flow intact.  S/P bedside debridement by Dr. Caballero  on 11/10/2022 (most likely surgical).   DP and PT pulses dopplered bilaterally; no hx of smoking.  Is off-loading with a knee walker, which I encouraged her to continue, with rationale.   Wound is too small for cellular skin substitute.   Wound Care Today/Continued Wound Care Tx Plan:  Clean with Dakins 0.25%, followed by topically CMPD Vanc 5%/Tobra 4%/Voricon 2, followed by secondary dressing.  To be done Q12H.      Right foot deformity:  MRI ruled out Charcot foot, per Dr. Caballero, podiatry.  Foot with significant arthritic bony deformities.   Loss of intrinsic mucle ROM bilaterally.  Full dorsiflexion of ankles and hallux.  Decreased flexion of bilateral ankles.   Normal monofilament testing bilaterally; no LOPS.      Callus of Right Plantar Foot:    Procedure Today: paring and sanding down of one right plantar foot callus  Rationale: calluses can lead to pre-ulcerative calluses with resultant diabetic foot ulcers.   Informed verbal consent obtained.     Using #4 dermal curette, Dremmel, and carolina boards, I pared and sanded one callus down to healthy soft tissue. No bleeding or discomfort with procedure.   Teaching provided on underlying causes of condition/disease, s/s of condition/disease, complications, prevention, and treatment of condition/disease. Instructions reinforced for her to continue sanding calluses down daily to avoid a pressure points, and resultant ulcers.  Handout provided on calluses and corns.   Phone number and address of [facility] provided with instructions for her to reach out to them and get evaluation for orthotic of right foot.      Xerosis of bilateral feet:  Teaching provided on underlying cause of condition, s/s of condition, complications, and treatment options of condition.    RX:  Lac-Hydrin 12%, followed by thin layer of Vaseline twice/day.    Teaching provided on new medication, expected outcomes of medication, how to administer medication, and possible s/e of  medications.   Instructed not to put creams/lotions between toes, with rationale.      Hammertoes:  Severe deformity of right 2nd toe.    Being followed by Dr. Caballero for surgical off-loading eval.         Diabetic Foot Care:  Toenails last trimmed on  10/28/2022            Return to clinic in ten days.  Teaching reinforced on s/s to call wound clinic for promptly.

## 2022-12-12 ENCOUNTER — HOSPITAL ENCOUNTER (OUTPATIENT)
Dept: WOUND CARE | Facility: HOSPITAL | Age: 57
Discharge: HOME | End: 2022-12-12
Attending: NURSE PRACTITIONER
Payer: COMMERCIAL

## 2022-12-12 VITALS — HEART RATE: 73 BPM | SYSTOLIC BLOOD PRESSURE: 145 MMHG | DIASTOLIC BLOOD PRESSURE: 73 MMHG

## 2022-12-12 DIAGNOSIS — M20.41 ACQUIRED HAMMERTOES OF BOTH FEET: ICD-10-CM

## 2022-12-12 DIAGNOSIS — M21.961 ACQUIRED DEFORMITY OF RIGHT FOOT: ICD-10-CM

## 2022-12-12 DIAGNOSIS — M20.42 ACQUIRED HAMMERTOES OF BOTH FEET: ICD-10-CM

## 2022-12-12 DIAGNOSIS — S91.104A OPEN WOUND OF LESSER TOE OF RIGHT FOOT: Primary | ICD-10-CM

## 2022-12-12 DIAGNOSIS — Z87.39 HISTORY OF OSTEOMYELITIS: ICD-10-CM

## 2022-12-12 DIAGNOSIS — L84 CALLUS OF FOOT: ICD-10-CM

## 2022-12-12 DIAGNOSIS — Z89.421 STATUS POST AMPUTATION OF LESSER TOE OF RIGHT FOOT: ICD-10-CM

## 2022-12-12 DIAGNOSIS — E11.628 TYPE 2 DIABETES MELLITUS WITH OTHER SKIN COMPLICATION, WITHOUT LONG-TERM CURRENT USE OF INSULIN: ICD-10-CM

## 2022-12-12 DIAGNOSIS — L89.891 PRESSURE INJURY OF TOE OF RIGHT FOOT, STAGE 1: ICD-10-CM

## 2022-12-12 PROCEDURE — 99213 OFFICE O/P EST LOW 20 MIN: CPT | Mod: ,,, | Performed by: NURSE PRACTITIONER

## 2022-12-12 PROCEDURE — 99211 OFF/OP EST MAY X REQ PHY/QHP: CPT

## 2022-12-12 PROCEDURE — 99213 PR OFFICE/OUTPT VISIT, EST, LEVL III, 20-29 MIN: ICD-10-PCS | Mod: ,,, | Performed by: NURSE PRACTITIONER

## 2022-12-12 NOTE — PROGRESS NOTES
TODAY'S VISIT NOTE WAS IMPORTED FROM LAST WOUND CLINIC VISIT OF 12/1/2022.  I ATTEST THAT I REVIEWED THE HPI, ROS, LABS, WOUND-RELATED IMAGING, PHYSICAL EXAMINATION, EVALUATION AND PLAN SECTIONS OF IMPORTED NOTE AND REVISED TODAY'S VISIT NOTE TO REFLECT TODAY'S NEW ASSESSMENT FINDINGS AND TODAY'S UPDATES TO WOUND TREATMENT PLAN.          CHIEF COMPLAINT:    Surgical wound to right foot.          HISTORY OF  PRESENT ILLNESS:  57 y.o. White female being seen today for follow-up of surgical wound to right 3rd toe.  Hospitalized @ [facility] 10/2/2022 - 10/7/2022, where she underwent amputation of right 3rd toe toe due to osteomyelitis; also initially septic with SIRS 2/4.  Diagnosed with Type 2 diabetes during hospitalization; she was started on insulin regimen, while inpatient.  Insulin was discontinued by PCP recently, and she is now prescribed Ozempic, along with Metformin ER 1000 mg.  HgbA1C 8.8 on 11/15/2022.  Followed by Dr. Maxwell in [facility] for PCP, whom cared for Ms. Araiza during recent hospitalization.   Had not been followed by physician for years.  Current wound care: cleaning with Dakins 0.25%, followed by Vanc 5%, Tobra 4%, and Voricon 2%, followed by secondary dressing.  Being changed Q12H.  Followed by Dr. Caballero, podiatrist, for left foot/toe deformities.  Scheduled to see him in Feb 2023, for evaluation of right foot for surgical off-loading.  Dr. Caballero wants current wound healed prior to moving forward with surgery.  Has been referred to [facility] for evaluation and treatment of right foot for brace versus AFO.  Followed by [facility].  History includes:        Past Medical History:   Diagnosis Date    Acquired deformity of right foot 10/10/2022    Acquired hammertoes of both feet 10/10/2022    History of osteomyelitis 10/10/2022    Open wound of lesser toe of right foot 10/10/2022    Primary hypertension 10/10/2022    Status post amputation of lesser toe of right foot  10/10/2022    Type 2 diabetes mellitus with skin complication, without long-term current use of insulin 10/10/2022      Past Surgical History:   Procedure Laterality Date    TOE AMPUTATION Right 10/5/2022    Procedure: AMPUTATION, right third toe;  Surgeon: Glen Roth MD;  Location: [facility];  Service: General;  Laterality: Right;      Social History     Socioeconomic History    Marital status:    Tobacco Use    Smoking status: Never    Smokeless tobacco: Never   Substance and Sexual Activity    Alcohol use: Not Currently    Drug use: Never       Review of Most Recent Wound Care-Related Labs:  Lab Results   Component Value Date/Time    WBC 5.3 10/07/2022 03:11 AM    RBC 4.28 10/07/2022 03:11 AM    HGB 12.9 10/07/2022 03:11 AM    HCT 37.3 10/07/2022 03:11 AM    MCV 87.1 10/07/2022 03:11 AM    MCH 30.1 10/07/2022 03:11 AM    CREATININE 0.58 10/07/2022 03:11 AM    HGBA1C 8.8 11/15/2022 04:10 PM    .00 (H) 10/02/2022 01:27 PM          Today 12/12/2022:  Has been doing callus care to bottom of right foot, as instructed last visit.  Skin is soft where callus used to be.  No reported complications with wound care or current treatment plan.  Has all wound care supplies in home.  Denies fevers, chills, wound odor, wound redness, wound pain, or yellow/green drainage.  No new rashes, lesions, or skin breakdown.   Has been driving  to all of his therapy visits; not sure if she will be able to return to work at beginning of year due to taking care of him.  Says she will go to [facility] and see about getting an off-loading insert for right foot.  Has never heard about lambs wool for protecting toes, but is interested in checking that out.  Did not take this morning's [place], stating she takes that when she eats; did not eat yet.     12/1/2022:  Picking up  from [facility] later today; is concerned how increased task of his care will impact wound healing.  Hoping she can return to work at beginning of  2023, if wound heals by then.  Right foot wound has healed quite a bit since last visit; she is pleased about that.  Has not been sanding down large callus on bottom of right foot, due to open wound on foot.  No reported complications with wound care or current treatment plan.  Has all wound care supplies in home.  Denies fevers, chills, wound odor, wound redness, wound pain, or yellow/green drainage.  No new rashes, lesions, or skin breakdown.  Will call [facility] to f/u on orthotic evaluation.      11/17/2022:  Dr. Caballero debrided wound last week, after appointment in wound clinic with me.  Instructed to continue with topically CMPD antibiotics, which she has been doing.  Dr. Caballero relayed she did not have diabetic charcot foot; however, had significant arthritic changes in right foot.  Plans to do tendon surgery on right 2nd toe, after surgical wound heals; no surgery planned on foot though.  To f/u with Dr. Caballero in three months.  Plans on returning to work after the new year.   being discharged from [facility] in two weeks, following significant CVA.  No reported complications with wound care or current treatment plan.  Has all wound care supplies in home.  Denies fevers, chills, wound odor, wound redness, wound pain, or yellow/green drainage.  No new rashes, lesions, or skin breakdown.     11/10/2022:  Scheduled to see Dr. Caballero later today to discuss results of recent MRI.  No reported complications with wound care or current treatment plan.  Has all wound care supplies in home, including topically CMPD antibiotics.   Wounds to left knee and left ring finger are healed.  Denies fevers, chills, wound odor, wound redness, wound pain, or yellow/green drainage.  No new rashes, lesions, or skin breakdown.  Still off-loading with knee walker.  Wearing Darco velcro shoe to right foot to prevent rubbing of 2nd toe, which is quite deformed with hammertoe.  Had a phone call from [facility]  earlier today regarding shoe for right foot deformity.  She will wait to call them back until Dr. Caballero discusses MRI and his tx plan with her.   CBGs consistently <160's.      11/3/2022:  Fell this afternoon in hospital parking lot, when her knee walker hit a pebble.  Skinned up left knee and left ring finger.  Right knee is red; however, no skin breakdown.  Wounds are superficial.  Denies any joint pain; able to move all fingers in left hand; sensation intact to hand/fingers.  No residual pain in knees; denies need for xrays.  No reported complications with wound care or current treatment plan.  Has all wound care supplies in home.  Antibiotic powder is all in one container and was mixed by pharmacy.  Denies fevers, chills, wound odor, wound redness, wound pain, or yellow/green drainage.   Had MRI of right foot at [facility]; scheduled for f/u with Dr. Caballero Thursday of next week to review results and establish tx plan.  CBGs have been <200 consistently.  Scheduled to see Dr. Maxwell, PCP, after wound clinic visit today.         REVIEW OF SYSTEMS:  Except as stated in HPI, all other 10 body systems normal.       Current Outpatient Medications   Medication Sig Dispense Refill    ammonium lactate 12 % Crea Apply 1 application topically 2 (two) times a day. Apply thin layer of Vaseline over Lac-Hydrin twice a day.  NOTHING BETWEEN TOES. 385 g 11    blood sugar diagnostic Strp Check blood glucose in the morning and before each meal. 100 each 11    blood-glucose meter Misc Check blood glucose in the morning and before each meal. 1 each 1    lancets (ACCU-CHEK MULTICLIX LANCET) Misc Check blood glucose in the morning and before each meal. 100 each 11    losartan (COZAAR) 50 MG tablet Take 1 tablet (50 mg total) by mouth once daily. 90 tablet 3    metFORMIN (GLUMETZA) 1000 MG (MOD) 24hr tablet Take 1 tablet (1,000 mg total) by mouth 2 (two) times daily with meals. 180 tablet 3    pen needle, diabetic (BD  "ULTRA-FINE PAVITHRA PEN NEEDLE) 32 gauge x 5/32" Ndle 1 Bag by Misc.(Non-Drug; Combo Route) route 3 (three) times daily. 90 each 11    semaglutide (OZEMPIC) 0.25 mg or 0.5 mg(2 mg/1.5 mL) pen injector Inject 0.25 mg into the skin every 7 days. 1 pen 5     No current facility-administered medications for this encounter.         PHYSICAL EXAMINATION AND VITAL SIGNS:    Vitals:    12/12/22 0821   BP: (!) 145/73   Pulse: 73         There is no height or weight on file to calculate BMI.      General:  Hypertensive with diabetes, asymptomatic with.  Well-nourished, in no acute distress.    Respiratory: Breathing even, quiet, and unlabored.  No coughing.  Cardiovascular:   No peripheral edema.   No hemosiderin staining or varicosities.   Scattered hair over dorsal toes.  Evidence of shaving legs.   Toenails well-groomed, with dystrophic changes.    Musculoskeletal:   Right 3rd toe amputation surgical incision.  Significant bony deformity of right foot with widened forefoot, high arch with prominent metatarsal heads, and severe 2nd hammertoe.  Hammertoes to left 2-4th toes; however, not as severe.    Neurologic:  A&O X 3.  Cranial nerves grossly intact.   Psychiatric:  Calm, cooperative.    Responses appropriate.   Integumentary:      Surgical wound to right 3rd toe space.    Wound dimensions smaller from last week.  100% granulation tissue.  No periwound erythema, purulent drainage, periwound edema, odors, induration, bogginess, or fluctuance.     Large callus over right plantar 1st met head:  Skin is soft, supple; no callus identified today.    Right 2nd toe, Stage 1 pressure injury:  Skin intact.  No erythema, purulent drainage, periwound edema, odors, induration, bogginess, or fluctuance.            Incision/Site 10/10/22 0828 Right Toe, third anterior;midline (Active)   10/10/22 0828   Present Prior to Hospital Arrival?: Yes   Side: Right   Location: Toe, third   Orientation: anterior;midline   Incision Type:    Closure " Method:    Additional Comments:    Removal Indication and Assessment:    Wound Outcome: Amputation   Removal Indications:    Dressing Appearance Dry 12/12/22 0821   Drainage Amount None 12/12/22 0821   Wound Length (cm) 0.3 cm 12/12/22 0821   Wound Width (cm) 0.3 cm 12/12/22 0821   Wound Depth (cm) 0.1 cm 12/12/22 0821   Wound Volume (cm^3) 0.009 cm^3 12/12/22 0821   Wound Surface Area (cm^2) 0.09 cm^2 12/12/22 0821   Care Cleansed with:;Antimicrobial agent 12/12/22 0821                         ASSESSMENT AND PLAN:    Type 2 diabetes with skin complication:  S/P right 3rd toe amputation @ [facility] on 10/5/2022 due to osteomyelitis.  Finished up 10-day course of Bactrim ordered upon hospital discharge, without side effects.   New dx of Type 2 diabetes, non-insulin dependent.   Insulin discontinued and replaced with Ozempic.  CBGs consistently <160, per self-report.    Last HgbA1C 8.8 on 11/15/2022.      Open wound right 3rd toe:  Responding well to current tx plan.  No s/s of localized infection; wound dimensions smaller from last visit.    Arterial flow intact.  S/P bedside debridement by Dr. Caballero on 11/10/2022 (most likely surgical).   DP and PT pulses dopplered bilaterally; no hx of smoking.  Wound is too small for cellular skin substitute.   Wound Care Today/New Wound Care Tx Plan:  Clean with Dakins 0.25%, followed by Stimulen powder, followed by secondary dressing.  To be done QOD.      Right foot deformity:  MRI ruled out Charcot foot, per Dr. Caballero, podiatry.  Foot with significant arthritic bony deformities.   Loss of intrinsic mucle ROM bilaterally.  Full dorsiflexion of ankles and hallux.  Decreased flexion of bilateral ankles.   Normal monofilament testing bilaterally; no LOPS.      Stage 1 pressure injury to right 2nd hammertoe:  Teaching provided on causes of pressure injuries, importance of properly fitting shoes with deep toe boxes, and use of lambs wool, with demonstration via photo, of that product  to prevent wounds over toes.     Callus of Right Plantar Foot:    Ms. Araiza has demonstrated adherence with callus care tx plan since her last visit.  Skin is soft and supple, where previous large callus was.  Demonstrated multiple metatarsal and bunion protectors to her today from [employer], as well as lambs wool to protect hammertoe deformities.  Teaching provided she needs to check feet/toes 1st 30 minutes after applying products for changes in skin, which indicates pressure/friction, and then increase intervals by an hour each time.  Instructed on risk of applying pressure off-loading products, leaving them on all day without monitoring, and experiencing new wounds by end of day.     Xerosis of bilateral feet:  Improved significantly.    Prescribed Lac-Hydrin 12%, followed by thin layer of Vaseline twice/day.   CPM    Hammertoes:  Severe deformity of right 2nd toe.    Being followed by Dr. Caballero for surgical off-loading eval, with scheduled appt in Feb 2023.         Diabetic Foot Care:  Toenails last trimmed on  10/28/2022            Return to clinic in ten days.  Teaching reinforced on s/s to call wound clinic for promptly.

## 2022-12-13 ENCOUNTER — HOSPITAL ENCOUNTER (OUTPATIENT)
Dept: RADIOLOGY | Facility: HOSPITAL | Age: 57
Discharge: HOME | End: 2022-12-13
Attending: NURSE PRACTITIONER
Payer: COMMERCIAL

## 2022-12-13 DIAGNOSIS — Z12.31 SCREENING MAMMOGRAM FOR BREAST CANCER: ICD-10-CM

## 2022-12-13 PROCEDURE — 77067 MAMMO DIGITAL SCREENING BILAT WITH TOMO: ICD-10-PCS | Mod: 26,,, | Performed by: RADIOLOGY

## 2022-12-13 PROCEDURE — 77067 SCR MAMMO BI INCL CAD: CPT | Mod: 26,,, | Performed by: RADIOLOGY

## 2022-12-13 PROCEDURE — 77063 MAMMO DIGITAL SCREENING BILAT WITH TOMO: ICD-10-PCS | Mod: 26,,, | Performed by: RADIOLOGY

## 2022-12-13 PROCEDURE — 77063 BREAST TOMOSYNTHESIS BI: CPT | Mod: 26,,, | Performed by: RADIOLOGY

## 2022-12-13 PROCEDURE — 77063 BREAST TOMOSYNTHESIS BI: CPT | Mod: TC

## 2022-12-13 PROCEDURE — 77067 SCR MAMMO BI INCL CAD: CPT | Mod: TC

## 2022-12-14 ENCOUNTER — OFFICE VISIT (OUTPATIENT)
Dept: FAMILY MEDICINE | Facility: CLINIC | Age: 57
End: 2022-12-14
Payer: COMMERCIAL

## 2022-12-14 VITALS
RESPIRATION RATE: 20 BRPM | BODY MASS INDEX: 27 KG/M2 | SYSTOLIC BLOOD PRESSURE: 107 MMHG | HEART RATE: 79 BPM | WEIGHT: 172 LBS | DIASTOLIC BLOOD PRESSURE: 69 MMHG | OXYGEN SATURATION: 97 % | HEIGHT: 67 IN | TEMPERATURE: 98 F

## 2022-12-14 DIAGNOSIS — Z23 NEED FOR SHINGLES VACCINE: Primary | ICD-10-CM

## 2022-12-14 PROCEDURE — 99215 OFFICE O/P EST HI 40 MIN: CPT | Mod: PBBFAC | Performed by: NURSE PRACTITIONER

## 2022-12-14 NOTE — PROGRESS NOTES
Family Medicine Clinic Note:    PCP: Sherly Lawrence MD    Maribel Araiza is a 57 y.o. female presents to clinic today for f/up with DM2, HTN, HLD      Subjective:   DM2 non-inulin dependent  -Last visit 11/15/22 and was on the metformin 1000 mg BID and levimir 12 units qHS. Glucose logs were <100  -Last visit they D/c the levemir and started on the ozempic 0.25 mg weekly (22). POCT A1C 8.8  -Am glucose fastin, 108, 138, 128  -2 hour PP:115, 120, 120, 103, 87  -PM: 103, 88, 102, 127, 98   -Feels hypoglycemic in the 80's   -Weight 184 on 11/15/22 and now 172 lb    HDL  -Last  10/20/22  -Not on any medications.   -Has cut out ice cream and       S/p right toe amputation  -followed by wound care  -Once the wound is healed Dr. Caballero to perform surgery frot left foot/toe deformities and  to initiate right foot surgical off loading      HTN   -BP today 107/69   -On losartan 50 mg daily  -Denies any side effects    HM:  Mammogram done 22 results pending  Shingles dose 1 of 2 vingles vaccine today  Declines pneumonia vaccine  Declines flu vaccine         ROS  Constitutional: No fevers, chills or fatigue  Respiratory: no trouble breathing or SOB, no ankle swelling  Gastrointestinal: no constipation, no diarrhea, no nausea, no vomiting    Current Outpatient Medications   Medication Sig Dispense Refill    ammonium lactate 12 % Crea Apply 1 application topically 2 (two) times a day. Apply thin layer of Vaseline over Lac-Hydrin twice a day.  NOTHING BETWEEN TOES. 385 g 11    blood sugar diagnostic Strp Check blood glucose in the morning and before each meal. 100 each 11    blood-glucose meter Misc Check blood glucose in the morning and before each meal. 1 each 1    lancets (ACCU-CHEK MULTICLIX LANCET) Misc Check blood glucose in the morning and before each meal. 100 each 11    losartan (COZAAR) 50 MG tablet Take 1 tablet (50 mg total) by mouth once daily. 90 tablet 3    metFORMIN (GLUMETZA)  "1000 MG (MOD) 24hr tablet Take 1 tablet (1,000 mg total) by mouth 2 (two) times daily with meals. 180 tablet 3    pen needle, diabetic (BD ULTRA-FINE PAVITHRA PEN NEEDLE) 32 gauge x 5/32" Ndle 1 Bag by Misc.(Non-Drug; Combo Route) route 3 (three) times daily. 90 each 11    semaglutide (OZEMPIC) 0.25 mg or 0.5 mg(2 mg/1.5 mL) pen injector Inject 0.25 mg into the skin every 7 days. 1 pen 5     No current facility-administered medications for this visit.       Objective:     /69 (BP Location: Left arm, Patient Position: Sitting)   Pulse 79   Temp 97.7 °F (36.5 °C) (Oral)   Resp 20   Ht 5' 7" (1.702 m)   Wt 78 kg (172 lb)   SpO2 97%   BMI 26.94 kg/m²   BP Readings from Last 3 Encounters:   12/14/22 107/69   12/12/22 (!) 145/73   12/01/22 132/81     Wt Readings from Last 3 Encounters:   12/14/22 78 kg (172 lb)   11/15/22 83.5 kg (184 lb 1.4 oz)   11/03/22 83.5 kg (184 lb 1.4 oz)     No results found for this or any previous visit (from the past 200 hour(s)).  Body mass index is 26.94 kg/m².    Physical Exam  Constitutional: NAD  Lungs: CTAB, No crackles, No wheezes  Cardiovascular: Normal heart sounds, No MRG  Abdomen: Soft, Nontender, No palpable hepatosplenomegaly, No abdominal masses  Extremities: No cyanosis, No clubbing, No edema, right foot with off  noted    The 10-year ASCVD risk score (Buck DK, et al., 2019) is: 4.9%    Values used to calculate the score:      Age: 57 years      Sex: Female      Is Non- : No      Diabetic: Yes      Tobacco smoker: No      Systolic Blood Pressure: 107 mmHg      Is BP treated: Yes      HDL Cholesterol: 55 mg/dL      Total Cholesterol: 247 mg/dL      Assessment:   Maribel Araiza is a 57 y.o. female with:      DM2  HLD  HTN  Right toe amputation  Plan:   DM2  -last A1C 8.8 11/15/22 Goal <7  -continue curent regimen with metformin and Ozempic  -Has lost 12 lbs with better glucose  -continue dietary modifications  -Reviewed  Lipid, CBC, CMP from " 10/7/22 and 10/20/22.  -Will repeat bmp, lipid and A1C next visit    HLD  -last  10/20/22 and ASCVD 4.9%    HTN  -Continue losartan   -Bp stable    Right toe amputation  -Keep next appt with wound care and Dr. Caballero (podiatrist)          HM:  Ordered Shingles dose 1 of 2 today   Mammogram done 12/13/22 results pending  Declines pneumonia vaccine  Declines flu vaccine     RTC in 2 months  Future Appointments   Date Time Provider Department Center   12/22/2022  8:30 AM TIM Rick [facility] JONE Manuel       Staff: Dr. Elodia Maxwell MD  Family Medicine PGY-2

## 2022-12-22 ENCOUNTER — HOSPITAL ENCOUNTER (OUTPATIENT)
Dept: WOUND CARE | Facility: HOSPITAL | Age: 57
Discharge: HOME | End: 2022-12-22
Attending: NURSE PRACTITIONER
Payer: COMMERCIAL

## 2022-12-22 VITALS — TEMPERATURE: 98 F | DIASTOLIC BLOOD PRESSURE: 78 MMHG | SYSTOLIC BLOOD PRESSURE: 124 MMHG | HEART RATE: 87 BPM

## 2022-12-22 DIAGNOSIS — E11.628 TYPE 2 DIABETES MELLITUS WITH OTHER SKIN COMPLICATION, WITHOUT LONG-TERM CURRENT USE OF INSULIN: Primary | ICD-10-CM

## 2022-12-22 DIAGNOSIS — L89.891 PRESSURE INJURY OF TOE OF RIGHT FOOT, STAGE 1: ICD-10-CM

## 2022-12-22 DIAGNOSIS — S91.104A OPEN WOUND OF LESSER TOE OF RIGHT FOOT: ICD-10-CM

## 2022-12-22 DIAGNOSIS — M21.961 ACQUIRED DEFORMITY OF RIGHT FOOT: ICD-10-CM

## 2022-12-22 DIAGNOSIS — M20.42 ACQUIRED HAMMERTOES OF BOTH FEET: ICD-10-CM

## 2022-12-22 DIAGNOSIS — M20.41 ACQUIRED HAMMERTOES OF BOTH FEET: ICD-10-CM

## 2022-12-22 DIAGNOSIS — Z89.421 STATUS POST AMPUTATION OF LESSER TOE OF RIGHT FOOT: ICD-10-CM

## 2022-12-22 DIAGNOSIS — Z87.39 HISTORY OF OSTEOMYELITIS: ICD-10-CM

## 2022-12-22 PROCEDURE — 99213 PR OFFICE/OUTPT VISIT, EST, LEVL III, 20-29 MIN: ICD-10-PCS | Mod: ,,, | Performed by: NURSE PRACTITIONER

## 2022-12-22 PROCEDURE — 99211 OFF/OP EST MAY X REQ PHY/QHP: CPT

## 2022-12-22 PROCEDURE — 99213 OFFICE O/P EST LOW 20 MIN: CPT | Mod: ,,, | Performed by: NURSE PRACTITIONER

## 2022-12-23 NOTE — PROGRESS NOTES
Subjective:       Patient ID: Maribel Araiza is a 57 y.o. female.    Chief Complaint: Right foot wound     56 y/o female presents to wound care clinic for right foot 3rd toe amputation wound, and 2nd toe redness from pressure to area. She is a pt of Jeanna Hill NP. Currently is taking care of her  that requires her to be on her feet and wear shoes a lot more than usual. She is very ready to return to work. She did present to clinic with proper foot wear and off loading pads. Reviewed medical history She presented to [facility] 10/2/2022 - 10/7/2022, where she underwent amputation of right 3rd toe toe due to osteomyelitis. Medical Hx: DM, Hammer toes, HTN, HLD, and H/O Osteomyelitis.     Today's Visit 12/22/22:  Right foot amputation site fully epithelize. Will have her clean feet daily with soap and water pat dry and use lamps wool between toes for protection. Instructed to continue wear her offload gel inserts. Instructed skin to amputation site is fragile so check feet frequency.Will have her return to clinic on 1/9/23 to see Jeanna Hill NP. Instructed to call office with any questions, concerns, or new skin issues. Verbalized understanding of all teachings.     Lab Results   Component Value Date/Time    WBC 5.3 10/07/2022 03:11 AM    RBC 4.28 10/07/2022 03:11 AM    HGB 12.9 10/07/2022 03:11 AM    HCT 37.3 10/07/2022 03:11 AM    MCV 87.1 10/07/2022 03:11 AM    MCH 30.1 10/07/2022 03:11 AM    CREATININE 0.58 10/07/2022 03:11 AM    HGBA1C 10.8 (H) 10/02/2022 01:18 PM    .00 (H) 10/02/2022 01:27 PM     Review of Systems   All other systems reviewed and are negative.        Objective:        Physical Exam  Vitals reviewed.   Musculoskeletal:      Right foot: Deformity, bunion and prominent metatarsal heads present.      Left foot: Deformity, bunion and prominent metatarsal heads present.      Right Lower Extremity: (3rd toe amputation)  Neurological:      Mental Status: She is alert.           Incision/Site 10/10/22 0828 Right Toe, third anterior;midline (Active)   10/10/22 0828   Present Prior to Hospital Arrival?: Yes   Side: Right   Location: Toe, third   Orientation: anterior;midline   Incision Type:    Closure Method:    Additional Comments:    Removal Indication and Assessment:    Wound Outcome: Amputation   Removal Indications:    Dressing Appearance Dry 12/22/22 0825   Drainage Amount None 12/22/22 0825   Wound Edges Rolled/closed 12/22/22 0825           Assessment:         ICD-10-CM ICD-9-CM   1. Type 2 diabetes mellitus with other skin complication, without long-term current use of insulin  E11.628 250.80   2. Status post amputation of lesser toe of right foot  Z89.421 V49.72   3. Open wound of lesser toe of right foot  S91.104A 893.0   4. Pressure injury of toe of right foot, stage 1  L89.891 707.09     707.21   5. Acquired deformity of right foot  M21.961 736.70   6. Acquired hammertoes of both feet  M20.41 735.4    M20.42    7. History of osteomyelitis  Z87.39 V13.59         Plan:   Tissue pathology and/or culture taken:  [] Yes [x] No   Sharp debridement performed:   [] Yes [x] No   Labs ordered this visit:   [] Yes [x] No   Imaging ordered this visit:   [] Yes [x] No         1. Type 2 diabetes mellitus with other skin complication, without long-term current use of insulin     Co-factor in wound development and delayed wound healing. Last A1C 10.8.    2. Status post amputation of lesser toe of right foot     Resulting in open wound.    3. Open wound of lesser toe of right foot     Fully epithelize will clean with soap and water daily apply lambs wool between toes and wear Darco wedge.    4. Pressure injury of toe of right foot, stage 1     Apply lambs wool daily to protection.       5. Acquired deformity of right foot     Resulting from DM, wearing off loading gel cushions.    6. Acquired hammertoes of both feet       Resulting from DM, wearing off loading gel cushions.       7. History of  osteomyelitis     Resulting in amputation of right foot 3rd foot.               Follow up in about 18 days (around 1/9/2023) for Right foot .

## 2023-01-09 ENCOUNTER — HOSPITAL ENCOUNTER (OUTPATIENT)
Dept: WOUND CARE | Facility: HOSPITAL | Age: 58
Discharge: HOME | End: 2023-01-09
Attending: NURSE PRACTITIONER
Payer: COMMERCIAL

## 2023-01-09 VITALS
DIASTOLIC BLOOD PRESSURE: 80 MMHG | TEMPERATURE: 98 F | HEART RATE: 92 BPM | SYSTOLIC BLOOD PRESSURE: 116 MMHG | RESPIRATION RATE: 20 BRPM

## 2023-01-09 DIAGNOSIS — S91.104A OPEN WOUND OF LESSER TOE OF RIGHT FOOT: Primary | ICD-10-CM

## 2023-01-09 DIAGNOSIS — L84 CALLUS OF FOOT: ICD-10-CM

## 2023-01-09 DIAGNOSIS — Z89.421 STATUS POST AMPUTATION OF LESSER TOE OF RIGHT FOOT: ICD-10-CM

## 2023-01-09 DIAGNOSIS — E11.628 TYPE 2 DIABETES MELLITUS WITH OTHER SKIN COMPLICATION, WITHOUT LONG-TERM CURRENT USE OF INSULIN: ICD-10-CM

## 2023-01-09 DIAGNOSIS — S91.301A OPEN WOUND OF RIGHT FOOT, INITIAL ENCOUNTER: ICD-10-CM

## 2023-01-09 PROCEDURE — 99211 OFF/OP EST MAY X REQ PHY/QHP: CPT | Mod: 27

## 2023-01-09 PROCEDURE — 99213 OFFICE O/P EST LOW 20 MIN: CPT | Mod: ,,, | Performed by: NURSE PRACTITIONER

## 2023-01-09 PROCEDURE — 99213 PR OFFICE/OUTPT VISIT, EST, LEVL III, 20-29 MIN: ICD-10-PCS | Mod: ,,, | Performed by: NURSE PRACTITIONER

## 2023-01-09 NOTE — LETTER
January 9, 2023      [facility]  [address] [address]  Phone: [phone]  Fax: [phone]       Patient: Maribel Araiza   YOB: 1965  Date of Visit: 01/09/2023    To Whom It May Concern:    Nguyen Araiza  was at [facility] on 01/09/2023. The patient may return to work on 1/16/2023 without  restrictions. If you have any questions or concerns, or if I can be of further assistance, please do not hesitate to contact me.    Sincerely,    TIM Perez

## 2023-01-09 NOTE — PROGRESS NOTES
TODAY'S VISIT NOTE WAS IMPORTED FROM LAST WOUND CLINIC VISIT OF 12/12/2022.  I ATTEST THAT I REVIEWED THE HPI, ROS, LABS, WOUND-RELATED IMAGING, PHYSICAL EXAMINATION, EVALUATION AND PLAN SECTIONS OF IMPORTED NOTE AND REVISED TODAY'S VISIT NOTE TO REFLECT TODAY'S NEW ASSESSMENT FINDINGS AND TODAY'S UPDATES TO WOUND TREATMENT PLAN.          CHIEF COMPLAINT:    Surgical wound to right foot.   New trauma wound to top of right foot.  Bruised callus to bottom of right foot.        HISTORY OF  PRESENT ILLNESS:  57 y.o. White female being seen today for follow-up of surgical wound to right 3rd toe.  Hospitalized @ [facility] 10/2/2022 - 10/7/2022, where she underwent amputation of right 3rd toe toe due to osteomyelitis; also initially septic with SIRS 2/4.  Diagnosed with Type 2 diabetes during hospitalization; she was started on insulin regimen, while inpatient.  Insulin was discontinued by PCP recently, and she is now prescribed Ozempic, along with Metformin ER 1000 mg.  HgbA1C 8.8 on 11/15/2022.  Followed by Dr. Maxwell in [facility] for PCP, whom cared for Ms. Araiza during recent hospitalization.   Had not been followed by physician for years.  Current wound care to healed toe amputation wound: cleaning with soap and water, followed by lambs wool for off-loading.   Followed by Dr. Caballero, podiatrist, for left foot/toe deformities.  Scheduled to see him in Feb 2023, for evaluation of right foot for surgical off-loading.  Dr. Caballero wants current wound healed prior to moving forward with surgery.  Has been referred to [facility] for evaluation and treatment of right foot for brace versus AFO.  Followed by [facility].  History includes:        Past Medical History:   Diagnosis Date    Acquired deformity of right foot 10/10/2022    Acquired hammertoes of both feet 10/10/2022    History of osteomyelitis 10/10/2022    Open wound of lesser toe of right foot 10/10/2022    Primary hypertension 10/10/2022     Status post amputation of lesser toe of right foot 10/10/2022    Type 2 diabetes mellitus with skin complication, without long-term current use of insulin 10/10/2022      Past Surgical History:   Procedure Laterality Date    TOE AMPUTATION Right 10/5/2022    Procedure: AMPUTATION, right third toe;  Surgeon: Glen Roth MD;  Location: [facility];  Service: General;  Laterality: Right;      Social History     Socioeconomic History    Marital status:    Tobacco Use    Smoking status: Never     Passive exposure: Never    Smokeless tobacco: Never   Substance and Sexual Activity    Alcohol use: Not Currently    Drug use: Never       Review of Most Recent Wound Care-Related Labs:  Lab Results   Component Value Date/Time    WBC 5.3 10/07/2022 03:11 AM    RBC 4.28 10/07/2022 03:11 AM    HGB 12.9 10/07/2022 03:11 AM    HCT 37.3 10/07/2022 03:11 AM    MCV 87.1 10/07/2022 03:11 AM    MCH 30.1 10/07/2022 03:11 AM    CREATININE 0.58 10/07/2022 03:11 AM    HGBA1C 10.8 (H) 10/02/2022 01:18 PM    .00 (H) 10/02/2022 01:27 PM          Today 1/9/2023:  Bottle of hair conditioner fell on top of right foot a couple days ago and caused a wound.  Has been applying topically CMPD antibiotics (Vanc 5%, Tobra 4%, and Voricon 2%) since injury occurred.  No purulent drainage, odors, or significant redness around wound.  Has a new bruise over bottom of right foot, where prominent bone in forefoot is located.  Had to jump up and catch her  last week and bruise developed in that area thereafter.  Is using lambs wool over right 2nd hammertoe.  Using gel metatarsal pads to right plantar forefoot; however, did not have that on, when she jumped up to grab .  No reported complications with wound care or current treatment plan.  Has all wound care supplies in home.  Denies fevers, chills, wound odor, wound redness, wound pain, or yellow/green drainage.  No new rashes, lesions, or skin breakdown.  Wanting to return to  work next week, if possible.      12/12/2022:  Has been doing callus care to bottom of right foot, as instructed last visit.  Skin is soft where callus used to be.  No reported complications with wound care or current treatment plan.  Has all wound care supplies in home.  Denies fevers, chills, wound odor, wound redness, wound pain, or yellow/green drainage.  No new rashes, lesions, or skin breakdown.   Has been driving  to all of his therapy visits; not sure if she will be able to return to work at beginning of year due to taking care of him.  Says she will go to [employer] and see about getting an off-loading insert for right foot.  Has never heard about lambs wool for protecting toes, but is interested in checking that out.  Did not take this morning's [place]in, stating she takes that when she eats; did not eat yet.     12/1/2022:  Picking up  from [facility] later today; is concerned how increased task of his care will impact wound healing.  Hoping she can return to work at beginning of 2023, if wound heals by then.  Right foot wound has healed quite a bit since last visit; she is pleased about that.  Has not been sanding down large callus on bottom of right foot, due to open wound on foot.  No reported complications with wound care or current treatment plan.  Has all wound care supplies in home.  Denies fevers, chills, wound odor, wound redness, wound pain, or yellow/green drainage.  No new rashes, lesions, or skin breakdown.  Will call [facility] to f/u on orthotic evaluation.      11/17/2022:  Dr. Caballero debrided wound last week, after appointment in wound clinic with me.  Instructed to continue with topically CMPD antibiotics, which she has been doing.  Dr. Caballero relayed she did not have diabetic charcot foot; however, had significant arthritic changes in right foot.  Plans to do tendon surgery on right 2nd toe, after surgical wound heals; no surgery planned on foot though.  To f/u with   [facility] in three months.  Plans on returning to work after the new year.   being discharged from [facility] in two weeks, following significant CVA.  No reported complications with wound care or current treatment plan.  Has all wound care supplies in home.  Denies fevers, chills, wound odor, wound redness, wound pain, or yellow/green drainage.  No new rashes, lesions, or skin breakdown.     11/10/2022:  Scheduled to see Dr. Caballero later today to discuss results of recent MRI.  No reported complications with wound care or current treatment plan.  Has all wound care supplies in home, including topically CMPD antibiotics.   Wounds to left knee and left ring finger are healed.  Denies fevers, chills, wound odor, wound redness, wound pain, or yellow/green drainage.  No new rashes, lesions, or skin breakdown.  Still off-loading with knee walker.  Wearing Darco velcro shoe to right foot to prevent rubbing of 2nd toe, which is quite deformed with hammertoe.  Had a phone call from [facility] earlier today regarding shoe for right foot deformity.  She will wait to call them back until Dr. Caballero discusses MRI and his tx plan with her.   CBGs consistently <160's.      11/3/2022:  Fell this afternoon in hospital parking lot, when her knee walker hit a pebble.  Skinned up left knee and left ring finger.  Right knee is red; however, no skin breakdown.  Wounds are superficial.  Denies any joint pain; able to move all fingers in left hand; sensation intact to hand/fingers.  No residual pain in knees; denies need for xrays.  No reported complications with wound care or current treatment plan.  Has all wound care supplies in home.  Antibiotic powder is all in one container and was mixed by pharmacy.  Denies fevers, chills, wound odor, wound redness, wound pain, or yellow/green drainage.   Had MRI of right foot at [facility]; scheduled for f/u with Dr. Caballero Thursday of next week to review results and establish tx  "plan.  CBGs have been <200 consistently.  Scheduled to see Dr. Maxwell, PCP, after wound clinic visit today.         REVIEW OF SYSTEMS:  Except as stated in HPI, all other 10 body systems normal.       Current Outpatient Medications   Medication Sig Dispense Refill    blood sugar diagnostic Strp Check blood glucose in the morning and before each meal. 100 each 11    blood-glucose meter Misc Check blood glucose in the morning and before each meal. 1 each 1    lancets (ACCU-CHEK MULTICLIX LANCET) Misc Check blood glucose in the morning and before each meal. 100 each 11    losartan (COZAAR) 50 MG tablet Take 1 tablet (50 mg total) by mouth once daily. 90 tablet 3    metFORMIN (GLUMETZA) 1000 MG (MOD) 24hr tablet Take 1 tablet (1,000 mg total) by mouth 2 (two) times daily with meals. 180 tablet 3    pen needle, diabetic (BD ULTRA-FINE PAVITHRA PEN NEEDLE) 32 gauge x 5/32" Ndle 1 Bag by Misc.(Non-Drug; Combo Route) route 3 (three) times daily. 90 each 11    semaglutide (OZEMPIC) 0.25 mg or 0.5 mg(2 mg/1.5 mL) pen injector Inject 0.25 mg into the skin every 7 days. 1 pen 5     No current facility-administered medications for this encounter.         PHYSICAL EXAMINATION AND VITAL SIGNS:    Vitals:    01/09/23 0840   BP: 116/80   Pulse: 92   Resp: 20   Temp: 98.1 °F (36.7 °C)         There is no height or weight on file to calculate BMI.      General:  VSS; afebrile.  Well-nourished, in no acute distress.    Respiratory: Breathing even, quiet, and unlabored.  No coughing.  Cardiovascular:   No peripheral edema.   No hemosiderin staining or varicosities.   Scattered hair over dorsal toes.  Evidence of shaving legs.   Toenails well-groomed, with dystrophic changes.    Musculoskeletal:   Right 3rd toe amputation surgical incision.  Significant bony deformity of right foot with widened forefoot, high arch with prominent metatarsal heads, and severe 2nd hammertoe.  Hammertoes to left 2-4th toes; however, not as severe.  "   Neurologic:  A&O X 3.  Cranial nerves grossly intact.   Psychiatric:  Calm, cooperative.    Responses appropriate.   Integumentary:      Surgical wound to right 3rd toe space.    Wound with small dry crusted lesion.  No periwound erythema, purulent drainage, periwound edema, odors, induration, bogginess, or fluctuance.     Large callus over right plantar 1st met head:  Large ecchymotic area under intact skin.  Callus has minimally recurred since last visit.      Right 2nd toe, Stage 1 pressure injury:  Skin intact.  No erythema, purulent drainage, periwound edema, odors, induration, bogginess, or fluctuance.     Trauma wound to right dorsal foot, skin level:  No s/s of localized infection.  No erythema, purulent drainage, periwound edema, odors, induration, bogginess, or fluctuance.                           ASSESSMENT AND PLAN:    Type 2 diabetes with skin complication:  S/P right 3rd toe amputation @ [facility] on 10/5/2022 due to osteomyelitis.  Finished up 10-day course of Bactrim ordered upon hospital discharge, without side effects.   New dx of Type 2 diabetes, non-insulin dependent.   Insulin discontinued and replaced with Ozempic.  CBGs consistently <160, per self-report.    Last HgbA1C 8.8 on 11/15/2022.      Open wound right 3rd toe:  Surgical wound 100% epithelialized, with very small dry crusted covering, which I was able to lightly sand down with carolina board to be flush with surrounding skin.    Arterial flow intact.  S/P bedside debridement by Dr. Caballero on 11/10/2022 (most likely surgical).   DP and PT pulses dopplered bilaterally; no hx of smoking.    Wound Care Today/New Wound Care Tx Plan:  Continue cleaning with soap and water and patting dry.  Apply lambs wool over wound and over right 2nd hammertoe.    Return to work letter provided today.  Dated for Monday 1/16/2023.      Trauma wound to right dorsal foot:  No s/s of localized infection.  Wound with light bleeding with cleaning today with moistened  gauze.  Should heal very quickly, as Ms. Araiza has sufficient arterial flow in that foot.   Wound Care Today/New Wound Care Tx Plan:  Clean wound with Dakins 0.25% and pat dry.  Apply silver collagen, followed by secondary dressing.  To be changed QOD.  Instructed her to resume the topically CMPD antibiotics, if foot develops increased swelling, redness, or drainage.        Right foot deformity:  MRI ruled out Charcot foot, per Dr. Caballero, podiatry.  Foot with significant arthritic bony deformities.   Loss of intrinsic mucle ROM bilaterally.  Full dorsiflexion of ankles and hallux.  Decreased flexion of bilateral ankles.   Normal monofilament testing bilaterally; no LOPS.      Stage 1 pressure injury to right 2nd hammertoe:  Wound is healed today.  Continued use of lambs wool to prevent pressure and friction.     Callus of Right Plantar Foot:    Skin intact over ecchymotic lesion.  Continued use of gel metatarsal adhesive pad at all times, while out of bed.  Instructed her to check callus daily and to call wound clinic promptly for any skin breakdown; and, she is to initiate topically CMPD to any skin breakdown in callus, pending wound clinic appointment, if-needed.       Xerosis of bilateral feet:  Improved significantly.    Prescribed Lac-Hydrin 12%, followed by thin layer of Vaseline twice/day.   CPM    Hammertoes:  Severe deformity of right 2nd toe.    Being followed by Dr. Caballero for surgical off-loading eval, with scheduled appt in Feb 2023.         Diabetic Foot Care:  Toenails last trimmed on  10/28/2022            Return to clinic in one week.  Teaching reinforced on s/s to call wound clinic for promptly.

## 2023-01-18 ENCOUNTER — HOSPITAL ENCOUNTER (OUTPATIENT)
Dept: WOUND CARE | Facility: HOSPITAL | Age: 58
Discharge: HOME | End: 2023-01-18
Attending: NURSE PRACTITIONER
Payer: COMMERCIAL

## 2023-01-18 VITALS — TEMPERATURE: 98 F | SYSTOLIC BLOOD PRESSURE: 121 MMHG | DIASTOLIC BLOOD PRESSURE: 76 MMHG | HEART RATE: 83 BPM

## 2023-01-18 DIAGNOSIS — Z89.421 STATUS POST AMPUTATION OF LESSER TOE OF RIGHT FOOT: ICD-10-CM

## 2023-01-18 DIAGNOSIS — S91.104A OPEN WOUND OF LESSER TOE OF RIGHT FOOT: ICD-10-CM

## 2023-01-18 DIAGNOSIS — L84 CALLUS OF FOOT: ICD-10-CM

## 2023-01-18 DIAGNOSIS — E11.628 TYPE 2 DIABETES MELLITUS WITH OTHER SKIN COMPLICATION, WITHOUT LONG-TERM CURRENT USE OF INSULIN: ICD-10-CM

## 2023-01-18 DIAGNOSIS — M20.41 ACQUIRED HAMMERTOES OF BOTH FEET: ICD-10-CM

## 2023-01-18 DIAGNOSIS — M21.961 ACQUIRED DEFORMITY OF RIGHT FOOT: ICD-10-CM

## 2023-01-18 DIAGNOSIS — S91.301D OPEN WOUND OF RIGHT FOOT, SUBSEQUENT ENCOUNTER: Primary | ICD-10-CM

## 2023-01-18 DIAGNOSIS — Z87.39 HISTORY OF OSTEOMYELITIS: ICD-10-CM

## 2023-01-18 DIAGNOSIS — M20.42 ACQUIRED HAMMERTOES OF BOTH FEET: ICD-10-CM

## 2023-01-18 PROCEDURE — 99213 OFFICE O/P EST LOW 20 MIN: CPT | Mod: ,,, | Performed by: NURSE PRACTITIONER

## 2023-01-18 PROCEDURE — 99211 OFF/OP EST MAY X REQ PHY/QHP: CPT

## 2023-01-18 PROCEDURE — 99213 PR OFFICE/OUTPT VISIT, EST, LEVL III, 20-29 MIN: ICD-10-PCS | Mod: ,,, | Performed by: NURSE PRACTITIONER

## 2023-01-18 NOTE — PROGRESS NOTES
TODAY'S VISIT NOTE WAS IMPORTED FROM LAST WOUND CLINIC VISIT OF 1/9/23.  I ATTEST THAT I REVIEWED THE HPI, ROS, LABS, WOUND-RELATED IMAGING, PHYSICAL EXAMINATION, EVALUATION AND PLAN SECTIONS OF IMPORTED NOTE AND REVISED TODAY'S VISIT NOTE TO REFLECT TODAY'S NEW ASSESSMENT FINDINGS AND TODAY'S UPDATES TO WOUND TREATMENT PLAN.          CHIEF COMPLAINT:    Surgical wound to right foot.   Wound to top of right foot.  Bruised callus to bottom of right foot.        HISTORY OF  PRESENT ILLNESS:  57 y.o. White female being seen today for follow-up of surgical wound to right 3rd toe.  Hospitalized @ [facility] 10/2/2022 - 10/7/2022, where she underwent amputation of right 3rd toe toe due to osteomyelitis; also initially septic with SIRS 2/4.  Diagnosed with Type 2 diabetes during hospitalization; she was started on insulin regimen, while inpatient.  Insulin was discontinued by PCP recently, and she is now prescribed Ozempic, along with Metformin ER 1000 mg.  HgbA1C 8.8 on 11/15/2022.  Followed by Dr. Maxwell in [facility] for PCP, whom cared for Ms. Araiza during recent hospitalization.   Had not been followed by physician for years.  Current wound care to healed toe amputation wound: cleaning with soap and water, followed by lambs wool for off-loading.   Followed by Dr. Caballero, podiatrist, for left foot/toe deformities.  Scheduled to see him in Feb 2023, for evaluation of right foot for surgical off-loading.  Dr. Caballero wants current wound healed prior to moving forward with surgery.  Has been referred to [facility] for evaluation and treatment of right foot for brace versus AFO.  Followed by [facility].  History includes:        Past Medical History:   Diagnosis Date    Acquired deformity of right foot 10/10/2022    Acquired hammertoes of both feet 10/10/2022    History of osteomyelitis 10/10/2022    Open wound of lesser toe of right foot 10/10/2022    Primary hypertension 10/10/2022    Status post  amputation of lesser toe of right foot 10/10/2022    Type 2 diabetes mellitus with skin complication, without long-term current use of insulin 10/10/2022      Past Surgical History:   Procedure Laterality Date    TOE AMPUTATION Right 10/5/2022    Procedure: AMPUTATION, right third toe;  Surgeon: Glen Roth MD;  Location: [facility];  Service: General;  Laterality: Right;      Social History     Socioeconomic History    Marital status:    Tobacco Use    Smoking status: Never     Passive exposure: Never    Smokeless tobacco: Never   Substance and Sexual Activity    Alcohol use: Not Currently    Drug use: Never       Review of Most Recent Wound Care-Related Labs:  Lab Results   Component Value Date/Time    WBC 5.3 10/07/2022 03:11 AM    RBC 4.28 10/07/2022 03:11 AM    HGB 12.9 10/07/2022 03:11 AM    HCT 37.3 10/07/2022 03:11 AM    MCV 87.1 10/07/2022 03:11 AM    MCH 30.1 10/07/2022 03:11 AM    CREATININE 0.58 10/07/2022 03:11 AM    HGBA1C 10.8 (H) 10/02/2022 01:18 PM    .00 (H) 10/02/2022 01:27 PM          Today 1/18/23:  Both wounds on right foot are healed.  Continues sanding callus on bottom of right foot, which still has underlying bruise.  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  He is having all kinds of cardiac studies done for surgical clearance.  Continues using lambs wool over hammertoes, as taught.  Continues using gel metatarsal pad to off-load prominent bones in right forefoot.  Denies fevers, chills, wound odor, wound redness, wound pain, or yellow/green drainage.  No new rashes, lesions, or skin breakdown.     1/9/2023:  Bottle of hair conditioner fell on top of right foot a couple days ago and caused a wound.  Has been applying topically CMPD antibiotics (Vanc 5%, Tobra 4%, and Voricon 2%) since injury occurred.  No purulent drainage, odors, or significant redness around wound.  Has a new bruise over bottom of right foot,  where prominent bone in forefoot is located.  Had to jump up and catch her  last week and bruise developed in that area thereafter.  Is using lambs wool over right 2nd hammertoe.  Using gel metatarsal pads to right plantar forefoot; however, did not have that on, when she jumped up to grab .  No reported complications with wound care or current treatment plan.  Has all wound care supplies in home.  Denies fevers, chills, wound odor, wound redness, wound pain, or yellow/green drainage.  No new rashes, lesions, or skin breakdown.  Wanting to return to work next week, if possible.      12/12/2022:  Has been doing callus care to bottom of right foot, as instructed last visit.  Skin is soft where callus used to be.  No reported complications with wound care or current treatment plan.  Has all wound care supplies in home.  Denies fevers, chills, wound odor, wound redness, wound pain, or yellow/green drainage.  No new rashes, lesions, or skin breakdown.   Has been driving  to all of his therapy visits; not sure if she will be able to return to work at beginning of year due to taking care of him.  Says she will go to [facility] and see about getting an off-loading insert for right foot.  Has never heard about lambs wool for protecting toes, but is interested in checking that out.  Did not take this morning's [place], stating she takes that when she eats; did not eat yet.     12/1/2022:  Picking up  from [facility] later today; is concerned how increased task of his care will impact wound healing.  Hoping she can return to work at beginning of 2023, if wound heals by then.  Right foot wound has healed quite a bit since last visit; she is pleased about that.  Has not been sanding down large callus on bottom of right foot, due to open wound on foot.  No reported complications with wound care or current treatment plan.  Has all wound care supplies in home.  Denies fevers, chills, wound odor, wound redness,  wound pain, or yellow/green drainage.  No new rashes, lesions, or skin breakdown.  Will call [facility] to f/u on orthotic evaluation.      11/17/2022:  Dr. Caballero debrided wound last week, after appointment in wound clinic with me.  Instructed to continue with topically CMPD antibiotics, which she has been doing.  Dr. Caballero relayed she did not have diabetic charcot foot; however, had significant arthritic changes in right foot.  Plans to do tendon surgery on right 2nd toe, after surgical wound heals; no surgery planned on foot though.  To f/u with Dr. Caballero in three months.  Plans on returning to work after the new year.   being discharged from [facility] in two weeks, following significant CVA.  No reported complications with wound care or current treatment plan.  Has all wound care supplies in home.  Denies fevers, chills, wound odor, wound redness, wound pain, or yellow/green drainage.  No new rashes, lesions, or skin breakdown.     11/10/2022:  Scheduled to see Dr. Caballero later today to discuss results of recent MRI.  No reported complications with wound care or current treatment plan.  Has all wound care supplies in home, including topically CMPD antibiotics.   Wounds to left knee and left ring finger are healed.  Denies fevers, chills, wound odor, wound redness, wound pain, or yellow/green drainage.  No new rashes, lesions, or skin breakdown.  Still off-loading with knee walker.  Wearing Darco velcro shoe to right foot to prevent rubbing of 2nd toe, which is quite deformed with hammertoe.  Had a phone call from [facility] earlier today regarding shoe for right foot deformity.  She will wait to call them back until Dr. Caballero discusses MRI and his tx plan with her.   CBGs consistently <160's.      11/3/2022:  Fell this afternoon in hospital parking lot, when her knee walker hit a pebble.  Skinned up left knee and left ring finger.  Right knee is red; however, no skin breakdown.  Wounds  "are superficial.  Denies any joint pain; able to move all fingers in left hand; sensation intact to hand/fingers.  No residual pain in knees; denies need for xrays.  No reported complications with wound care or current treatment plan.  Has all wound care supplies in home.  Antibiotic powder is all in one container and was mixed by pharmacy.  Denies fevers, chills, wound odor, wound redness, wound pain, or yellow/green drainage.   Had MRI of right foot at [facility]; scheduled for f/u with Dr. Caballero Thursday of next week to review results and establish tx plan.  CBGs have been <200 consistently.  Scheduled to see Dr. Maxwell, PCP, after wound clinic visit today.         REVIEW OF SYSTEMS:  Except as stated in HPI, all other 10 body systems normal.       Current Outpatient Medications   Medication Sig Dispense Refill    blood sugar diagnostic Strp Check blood glucose in the morning and before each meal. 100 each 11    blood-glucose meter Misc Check blood glucose in the morning and before each meal. 1 each 1    lancets (ACCU-CHEK MULTICLIX LANCET) Misc Check blood glucose in the morning and before each meal. 100 each 11    losartan (COZAAR) 50 MG tablet Take 1 tablet (50 mg total) by mouth once daily. 90 tablet 3    metFORMIN (GLUMETZA) 1000 MG (MOD) 24hr tablet Take 1 tablet (1,000 mg total) by mouth 2 (two) times daily with meals. 180 tablet 3    pen needle, diabetic (BD ULTRA-FINE PAVITHRA PEN NEEDLE) 32 gauge x 5/32" Ndle 1 Bag by Misc.(Non-Drug; Combo Route) route 3 (three) times daily. 90 each 11    semaglutide (OZEMPIC) 0.25 mg or 0.5 mg(2 mg/1.5 mL) pen injector Inject 0.25 mg into the skin every 7 days. 1 pen 5     No current facility-administered medications for this encounter.         PHYSICAL EXAMINATION AND VITAL SIGNS:    Vitals:    01/18/23 0933   BP: 121/76   Pulse: 83   Temp: 97.5 °F (36.4 °C)         There is no height or weight on file to calculate BMI.      General:  VSS; afebrile.  " Well-nourished, in no acute distress.    Respiratory: Breathing even, quiet, and unlabored.  No coughing.  Cardiovascular:   No peripheral edema.   No hemosiderin staining or varicosities.   Scattered hair over dorsal toes.  Evidence of shaving legs.   Toenails well-groomed, with dystrophic changes.    Musculoskeletal:   Right 3rd toe amputation surgical scar.  Significant bony deformity of right foot with widened forefoot, high arch with prominent metatarsal heads, and severe 2nd hammertoe.  Hammertoes to left 2-4th toes; however, not as severe.    Neurologic:  A&O X 3.  Cranial nerves grossly intact.   Psychiatric:  Calm, cooperative.    Responses appropriate.   Integumentary:      Surgical wound to right 3rd toe space.    Wound 100% epithelialized.  No periwound erythema, purulent drainage, periwound edema, odors, induration, bogginess, or fluctuance.     Callus over right plantar 1st met head:  Minimal recurrence of callus.  Ecchymotic area under intact skin.  Instructed to continue monitoring callus daily and to continue using carolina boards to sand down recurrent callus, as well as continued use of metatarsal gel pad for off-loading.       Trauma wound to right dorsal foot, skin level:  Wound is healed.  No erythema, purulent drainage, periwound edema, odors, induration, bogginess, or fluctuance.                                   ASSESSMENT AND PLAN:    Type 2 diabetes with skin complication:  S/P right 3rd toe amputation @ [facility] on 10/5/2022 due to osteomyelitis.  Finished up 10-day course of Bactrim ordered upon hospital discharge, without side effects.   New dx of Type 2 diabetes, non-insulin dependent.   Insulin discontinued and replaced with Ozempic.  CBGs consistently <160, per self-report.    Last HgbA1C 8.8 on 11/15/2022.      Open wound right 3rd toe:  Surgical wound is healed.    Arterial flow intact.  S/P bedside debridement by Dr. Caballero on 11/10/2022 (most likely surgical).   DP and PT pulses  dopplered bilaterally; no hx of smoking.    Wound Care Today/Continued Wound Care Tx Plan:  Continue cleaning with soap and water and patting dry.  Apply lambs wool over wound and over right 2nd hammertoe.    Return to work letter provided today.  Dated for Monday 1/16/2023.      Trauma wound to right dorsal foot:  Wound healed today.  Teaching provided on remodeling phase of wound healing, risk of wound reopening, and s/s of deterioration to contact wound clinic promptly for, with rationale.        Right foot deformity:  MRI ruled out Charcot foot, per Dr. Caballero, podiatry.  Foot with significant arthritic bony deformities.   Loss of intrinsic mucle ROM bilaterally.  Full dorsiflexion of ankles and hallux.  Decreased flexion of bilateral ankles.   Normal monofilament testing bilaterally; no LOPS.  Scheduled to see Dr. Caballero in Feb 23.     Stage 1 pressure injury to right 2nd hammertoe:  Wound remains healed today.  Continued use of lambs wool to prevent pressure and friction.     Callus of Right Plantar Foot:    Skin intact over ecchymotic lesion.  Continued use of gel metatarsal adhesive pad at all times, while out of bed.  Instructions reinforced to check callus daily and to call wound clinic promptly for any skin breakdown; and, she is to initiate topically CMPD to any skin breakdown in callus, pending wound clinic appointment, if-needed.       Xerosis of bilateral feet:  Improved significantly.    Prescribed Lac-Hydrin 12%, followed by thin layer of Vaseline twice/day.   CPM    Hammertoes:  Severe deformity of right 2nd toe.    Being followed by Dr. Caballero for surgical off-loading eval, with scheduled appt in Feb 2023.         Diabetic Foot Care:  Toenails last trimmed on  10/28/2022  Will see her in one month and perform DFC then.            Return to clinic in one month.  Teaching reinforced on s/s to call wound clinic for promptly.

## 2023-02-14 ENCOUNTER — OFFICE VISIT (OUTPATIENT)
Dept: FAMILY MEDICINE | Facility: CLINIC | Age: 58
End: 2023-02-14
Payer: COMMERCIAL

## 2023-02-14 VITALS
WEIGHT: 170.81 LBS | HEART RATE: 88 BPM | TEMPERATURE: 98 F | OXYGEN SATURATION: 99 % | DIASTOLIC BLOOD PRESSURE: 76 MMHG | SYSTOLIC BLOOD PRESSURE: 127 MMHG | BODY MASS INDEX: 26.81 KG/M2 | HEIGHT: 67 IN | RESPIRATION RATE: 18 BRPM

## 2023-02-14 DIAGNOSIS — Z12.11 ENCOUNTER FOR SCREENING COLONOSCOPY: ICD-10-CM

## 2023-02-14 DIAGNOSIS — Z87.39 HISTORY OF OSTEOMYELITIS: ICD-10-CM

## 2023-02-14 DIAGNOSIS — Z89.421 STATUS POST AMPUTATION OF LESSER TOE OF RIGHT FOOT: ICD-10-CM

## 2023-02-14 DIAGNOSIS — Z11.59 NEED FOR HEPATITIS C SCREENING TEST: ICD-10-CM

## 2023-02-14 DIAGNOSIS — E11.69 TYPE 2 DIABETES MELLITUS WITH OTHER SPECIFIED COMPLICATION, WITH LONG-TERM CURRENT USE OF INSULIN: ICD-10-CM

## 2023-02-14 DIAGNOSIS — E78.5 HYPERLIPIDEMIA, UNSPECIFIED HYPERLIPIDEMIA TYPE: Primary | ICD-10-CM

## 2023-02-14 DIAGNOSIS — R20.2 PARESTHESIA OF HAND, BILATERAL: ICD-10-CM

## 2023-02-14 DIAGNOSIS — I10 HYPERTENSION, UNSPECIFIED TYPE: ICD-10-CM

## 2023-02-14 DIAGNOSIS — Z11.4 ENCOUNTER FOR SCREENING FOR HIV: ICD-10-CM

## 2023-02-14 DIAGNOSIS — Z79.4 TYPE 2 DIABETES MELLITUS WITH OTHER SPECIFIED COMPLICATION, WITH LONG-TERM CURRENT USE OF INSULIN: ICD-10-CM

## 2023-02-14 LAB
ALBUMIN SERPL-MCNC: 3.9 G/DL (ref 3.5–5)
ALBUMIN/GLOB SERPL: 1.1 RATIO (ref 1.1–2)
ALP SERPL-CCNC: 85 UNIT/L (ref 40–150)
ALT SERPL-CCNC: 32 UNIT/L (ref 0–55)
AST SERPL-CCNC: 21 UNIT/L (ref 5–34)
BILIRUBIN DIRECT+TOT PNL SERPL-MCNC: 0.5 MG/DL
BUN SERPL-MCNC: 10.7 MG/DL (ref 9.8–20.1)
CALCIUM SERPL-MCNC: 9.7 MG/DL (ref 8.4–10.2)
CHLORIDE SERPL-SCNC: 105 MMOL/L (ref 98–107)
CHOLEST SERPL-MCNC: 213 MG/DL
CHOLEST/HDLC SERPL: 4 {RATIO} (ref 0–5)
CO2 SERPL-SCNC: 29 MMOL/L (ref 22–29)
CREAT SERPL-MCNC: 0.64 MG/DL (ref 0.55–1.02)
EST. AVERAGE GLUCOSE BLD GHB EST-MCNC: 128.4 MG/DL
GFR SERPLBLD CREATININE-BSD FMLA CKD-EPI: >60 MLS/MIN/1.73/M2
GLOBULIN SER-MCNC: 3.5 GM/DL (ref 2.4–3.5)
GLUCOSE SERPL-MCNC: 127 MG/DL (ref 74–100)
HBA1C MFR BLD: 6.1 %
HCV AB SERPL QL IA: NONREACTIVE
HDLC SERPL-MCNC: 60 MG/DL (ref 35–60)
HIV 1+2 AB+HIV1 P24 AG SERPL QL IA: NONREACTIVE
LDLC SERPL CALC-MCNC: 132 MG/DL (ref 50–140)
POTASSIUM SERPL-SCNC: 3.9 MMOL/L (ref 3.5–5.1)
PROT SERPL-MCNC: 7.4 GM/DL (ref 6.4–8.3)
SODIUM SERPL-SCNC: 140 MMOL/L (ref 136–145)
TRIGL SERPL-MCNC: 105 MG/DL (ref 37–140)
VLDLC SERPL CALC-MCNC: 21 MG/DL

## 2023-02-14 PROCEDURE — 86803 HEPATITIS C AB TEST: CPT

## 2023-02-14 PROCEDURE — 80053 COMPREHEN METABOLIC PANEL: CPT

## 2023-02-14 PROCEDURE — 99214 OFFICE O/P EST MOD 30 MIN: CPT | Mod: PBBFAC | Performed by: NURSE PRACTITIONER

## 2023-02-14 PROCEDURE — 83036 HEMOGLOBIN GLYCOSYLATED A1C: CPT

## 2023-02-14 PROCEDURE — 90750 HZV VACC RECOMBINANT IM: CPT | Mod: PBBFAC

## 2023-02-14 PROCEDURE — 36415 COLL VENOUS BLD VENIPUNCTURE: CPT

## 2023-02-14 PROCEDURE — 36415 COLL VENOUS BLD VENIPUNCTURE: CPT | Performed by: NURSE PRACTITIONER

## 2023-02-14 PROCEDURE — 87389 HIV-1 AG W/HIV-1&-2 AB AG IA: CPT

## 2023-02-14 PROCEDURE — 82306 VITAMIN D 25 HYDROXY: CPT | Performed by: NURSE PRACTITIONER

## 2023-02-14 PROCEDURE — 90471 IMMUNIZATION ADMIN: CPT | Mod: PBBFAC

## 2023-02-14 PROCEDURE — 80061 LIPID PANEL: CPT

## 2023-02-14 PROCEDURE — 82607 VITAMIN B-12: CPT | Performed by: NURSE PRACTITIONER

## 2023-02-14 RX ORDER — AMMONIUM LACTATE 12 G/100G
1 CREAM TOPICAL 2 TIMES DAILY
COMMUNITY
Start: 2023-01-24

## 2023-02-14 RX ORDER — SEMAGLUTIDE 1.34 MG/ML
0.25 INJECTION, SOLUTION SUBCUTANEOUS
Qty: 1 PEN | Refills: 5 | Status: SHIPPED | OUTPATIENT
Start: 2023-02-14 | End: 2023-03-16

## 2023-02-14 RX ORDER — SEMAGLUTIDE 1.34 MG/ML
0.5 INJECTION, SOLUTION SUBCUTANEOUS
Qty: 1 PEN | Refills: 1 | Status: SHIPPED | OUTPATIENT
Start: 2023-02-14 | End: 2023-02-14

## 2023-02-14 NOTE — PROGRESS NOTES
Family Medicine Clinic Note:    PCP: Sherly Lawrence MD    Maribel Araiza is a 57 y.o. female with a pmHx s/p amputation of 3rd digit right foot due to osteomyelitis (last hospitalization 10/2/2022 - 10/7/2022) presents to clinic today for f/up regarding diabetes and HTN      Subjective:   DM2  S/p 3rd digit right foot amputation 2/2 Osteomyelitis  -On ozempic 0.25 mg and metformin 1000 mg daily and tolerating well  -Current wd care: cleaning with soap and water, followed by lambs wool for off-loading. Possible right foot for surgical off-loading by Dr. 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    Paresthesia of bilateral hands  -Having Numbness and tingling in am and evening   -No confusion,shooting pains,  burning of feet or hands, calf cramps, twitching     HTN  On losartan 50 mg daily  BP today stable  No Chest pain, sob or palpitations    HLD  Last , , Tri 139 (10/20/22)  Has been off of the dietary modifications x 1 month  No recent labs      HM:  Mammogram with Eric BIRADS 1 Biil (12/29/22)  Shingles dose 1 of 2 vingles vaccine (12/14/22) needs dose #2 today  Declines pneumonia vaccine  Declines flu vaccine   Declined the COVID vaccine  Needs Hepatitis C Screen today  Needs HIV screen today  Needs cologuardnscreen today  Needs cervical screening next visit  Foot exam to be done next visit  Eye exam to be done next visit.  Attempted but machine was broken  Needs tetanus next visit  Low dose statin Pt is considering  Diabetes urine screening to be done next visit         Care Team:  Dr. Caballero, podiatrist [facility] (f/up appt  2/16/23)  [facility] (Was referred but unable to get orthotics done there)  [facility]      ROS  As per HPI    Current Outpatient Medications   Medication Sig Dispense Refill    blood sugar diagnostic Strp Check blood glucose in the morning and before each meal. 100 each 11    blood-glucose  "meter Misc Check blood glucose in the morning and before each meal. 1 each 1    lancets (ACCU-CHEK MULTICLIX LANCET) Misc Check blood glucose in the morning and before each meal. 100 each 11    losartan (COZAAR) 50 MG tablet Take 1 tablet (50 mg total) by mouth once daily. 90 tablet 3    metFORMIN (GLUMETZA) 1000 MG (MOD) 24hr tablet Take 1 tablet (1,000 mg total) by mouth 2 (two) times daily with meals. 180 tablet 3    pen needle, diabetic (BD ULTRA-FINE PAVITHRA PEN NEEDLE) 32 gauge x 5/32" Ndle 1 Bag by Misc.(Non-Drug; Combo Route) route 3 (three) times daily. 90 each 11    semaglutide (OZEMPIC) 0.25 mg or 0.5 mg(2 mg/1.5 mL) pen injector Inject 0.25 mg into the skin every 7 days. 1 pen 5     No current facility-administered medications for this visit.       Objective:     There were no vitals taken for this visit.  BP Readings from Last 3 Encounters:   01/18/23 121/76   01/09/23 116/80   12/22/22 124/78     Wt Readings from Last 3 Encounters:   12/14/22 78 kg (172 lb)   11/15/22 83.5 kg (184 lb 1.4 oz)   11/03/22 83.5 kg (184 lb 1.4 oz)     No results found for this or any previous visit (from the past 200 hour(s)).  There is no height or weight on file to calculate BMI.    Weight 12/14/22 172 and today 170 lb, BMI 26.74        Physical Exam  Constitutional: NAD, sitting up on the examining table in good spirits  Lungs: CTAB, No crackles, No wheezes  Cardiovascular: Normal heart sounds, No MRG  Abdomen: Soft, Nontender, No abdominal masses, Bowel sounds active x 4 quadrants  Extremities: No edema to LE  Neuro: Gross CN II-XII intact, DTR +2 shahram, to UE/LE no loss of proprioceptive sensation. Strength 5/5 intact bilaterally, normal gait, no gait ataxia, No spasticity of LE or UE noted.       Assessment:   Maribel Araiza is a 57 y.o. female with:    DM2  S/p 3rd digit right foot amputation   Paresthesia of bilateral hands  HTN  HLD  Plan:   DM2  S/p 3rd digit right foot amputation  -last A1C 8.8 (11/15/22)  -Ordered A1C "   -Continue Ozempic at 0.25 mg as long as the A1C is controlled but will consider increasing if it is not  -Today's A1C was 6.1 (refilled Ozempic)  -Keep scheduled appointment with Dr. Caballero, podiatrist   -Keep scheduled appointment with wound care on 2/20/23    Paresthesia of bilateral hands  -Ordered B12 and vitamin D level today  -Will consider vitamin E level , B1(thiamine) level, B6 level, serum copper seen in deficiencies, serum zinc in toxic levels (may be seen in wound care products) as deficiencies/toxicities in these can contribute to paresthesia. Note that pt needs to be fasting prior to some of these labs.  -last Thyroid panel was normal  -If labs normal will consider EMG and nerve conduction studies for possible sensory neuropathy  -CMP today with normal calcium and triglyceride level as hypocalcemia and hypertriglyceridemia levels can contribute to paresthesia  -Next visit will need to consider carpal tunnel as a plausible etiology since cleaning can with the hands can entail  repetitive hand movements       HTN:   Continue losartan  Bp stable   Ordered CMP     HLD  -Ordered lipid panel   -Pt was encouraged to consider adding a high intensity statin.  -Physical activity has been limited in the past due to the recent toe amputation.     -ASCVD score 2.9 % currently but due to her multiple risk factors of Diabetes, HTN, dyslipidemia and a BMI of  26.74  warranted a conversation regarding the addition of a statin and the benefits versus risks.  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  Long term compliance is of major concern. I am encouraged that the reasonable expectation of 30% percent relative risk reduction maintained for a longer period of time will outweigh the costs, burden of daily administration and the  potential side effects.  In addition, the ADA recommends for individuals 40 to 75 years with diabetes and at least one additional CVD risk factor to be placed on a high intensity statin therapy with a target goal of LDL<70 and a reduction from her baseline (currently 164) to at least 50%.  At minimum the ADA recommends the initiation of statin for all adults with diabetes between 40-75  years to be placed on a moderate intensity statin.  Pt will consider this proposed option.   -In the meantime she will continue dietary modifications   -Consider and discuss at next visit the alternative benefits and risks of adding aspirin to reduce CVD risk, possible reduction in colon cancer incidence and mortality.   -Consider adding an SGLT2 versus GLP1  to reduce the risk of cardiovascular event   -Consider obtaining calcium score    -Consider next visit initiating a food diary, nutritional consult and a DASH or Mediterranean diet with the goal of identifying and alleviating poor dietary patterns and behaviors.        HM:   Ordered Shingles #2 dose today  Ordered Hepatitis C and HIV screen today  Ordered Cologuard today            Future Appointments   Date Time Provider Department Center   2/14/2023  8:00 AM RESIDENT 13, [facility] FAMILY MEDICINE [facility]   2/20/2023  8:30 AM TIM Perez [facility]       Staff: Dr. Juana Maxwell MD  Family Medicine PGY-2

## 2023-02-15 LAB
DEPRECATED CALCIDIOL+CALCIFEROL SERPL-MC: 48.8 NG/ML (ref 30–80)
VIT B12 SERPL-MCNC: 745 PG/ML (ref 213–816)

## 2023-02-20 ENCOUNTER — HOSPITAL ENCOUNTER (OUTPATIENT)
Dept: WOUND CARE | Facility: HOSPITAL | Age: 58
Discharge: HOME | End: 2023-02-20
Attending: NURSE PRACTITIONER
Payer: COMMERCIAL

## 2023-02-20 VITALS
DIASTOLIC BLOOD PRESSURE: 80 MMHG | RESPIRATION RATE: 20 BRPM | SYSTOLIC BLOOD PRESSURE: 130 MMHG | HEART RATE: 67 BPM | TEMPERATURE: 98 F

## 2023-02-20 DIAGNOSIS — M21.961 ACQUIRED DEFORMITY OF RIGHT FOOT: ICD-10-CM

## 2023-02-20 DIAGNOSIS — L60.2 OVERGROWN TOENAILS: Primary | ICD-10-CM

## 2023-02-20 DIAGNOSIS — Z89.421 HISTORY OF AMPUTATION OF LESSER TOE, RIGHT: ICD-10-CM

## 2023-02-20 DIAGNOSIS — M20.42 ACQUIRED HAMMERTOES OF BOTH FEET: ICD-10-CM

## 2023-02-20 DIAGNOSIS — B35.1 MYCOTIC TOENAILS: ICD-10-CM

## 2023-02-20 DIAGNOSIS — E11.9 ENCOUNTER FOR COMPREHENSIVE DIABETIC FOOT EXAMINATION, TYPE 2 DIABETES MELLITUS: ICD-10-CM

## 2023-02-20 DIAGNOSIS — Z87.39 HISTORY OF OSTEOMYELITIS: ICD-10-CM

## 2023-02-20 DIAGNOSIS — M20.41 ACQUIRED HAMMERTOES OF BOTH FEET: ICD-10-CM

## 2023-02-20 DIAGNOSIS — L84 CALLUS OF FOOT: ICD-10-CM

## 2023-02-20 DIAGNOSIS — E11.628 TYPE 2 DIABETES MELLITUS WITH OTHER SKIN COMPLICATION, WITHOUT LONG-TERM CURRENT USE OF INSULIN: ICD-10-CM

## 2023-02-20 PROCEDURE — 11055 PARING/CUTG B9 HYPRKER LES 1: CPT

## 2023-02-20 PROCEDURE — 11719 TRIM NAIL(S) ANY NUMBER: CPT

## 2023-02-20 PROCEDURE — 99499 UNLISTED E&M SERVICE: CPT | Mod: ,,, | Performed by: NURSE PRACTITIONER

## 2023-02-20 PROCEDURE — 11721 DEBRIDE NAIL 6 OR MORE: CPT | Mod: ,,, | Performed by: NURSE PRACTITIONER

## 2023-02-20 PROCEDURE — 11055 PR TRIM HYPERKERATOTIC SKIN LESION, ONE: ICD-10-PCS | Mod: 59,,, | Performed by: NURSE PRACTITIONER

## 2023-02-20 PROCEDURE — 99499 NO LOS: ICD-10-PCS | Mod: ,,, | Performed by: NURSE PRACTITIONER

## 2023-02-20 PROCEDURE — 11055 PARING/CUTG B9 HYPRKER LES 1: CPT | Mod: 59,,, | Performed by: NURSE PRACTITIONER

## 2023-02-20 PROCEDURE — 11721 PR DEBRIDEMENT OF NAILS, 6 OR MORE: ICD-10-PCS | Mod: ,,, | Performed by: NURSE PRACTITIONER

## 2023-02-20 PROCEDURE — 11721 DEBRIDE NAIL 6 OR MORE: CPT

## 2023-02-20 NOTE — PROGRESS NOTES
TODAY'S VISIT NOTE WAS IMPORTED FROM LAST WOUND CLINIC VISIT OF 1/18/23.  I ATTEST THAT I REVIEWED THE HPI, ROS, LABS, WOUND-RELATED IMAGING, PHYSICAL EXAMINATION, EVALUATION AND PLAN SECTIONS OF IMPORTED NOTE AND REVISED TODAY'S VISIT NOTE TO REFLECT TODAY'S NEW ASSESSMENT FINDINGS AND TODAY'S UPDATES TO WOUND TREATMENT PLAN.          CHIEF COMPLAINT:    Routine diabetic foot care    HISTORY OF  PRESENT ILLNESS:  57 y.o. White female being seen today for routine diabetic foot care.  Last visit with PCP, Dr. Lawrence, was on 2/14/23.  Hx of amputated right lesser toe, which was done during [facility] hospitalization 10/2/2022 - 10/7/2022, due to osteomyelitis.  Followed by Dr. Caballero, podiatrist, for left foot/toe deformities.  Planned surgical off-loading sometime in summer 2024, following 's hip surgery in April 23.  Has been referred to [facility] for evaluation and treatment of right foot for brace versus AFO.  History includes:        Past Medical History:   Diagnosis Date    Acquired deformity of right foot 10/10/2022    Acquired hammertoes of both feet 10/10/2022    History of osteomyelitis 10/10/2022    Open wound of lesser toe of right foot 10/10/2022    Primary hypertension 10/10/2022    Status post amputation of lesser toe of right foot 10/10/2022    Type 2 diabetes mellitus with skin complication, without long-term current use of insulin 10/10/2022      Past Surgical History:   Procedure Laterality Date    TOE AMPUTATION Right 10/5/2022    Procedure: AMPUTATION, right third toe;  Surgeon: Glen Roth MD;  Location: [facility];  Service: General;  Laterality: Right;      Social History     Socioeconomic History    Marital status:    Tobacco Use    Smoking status: Never     Passive exposure: Never    Smokeless tobacco: Never   Substance and Sexual Activity    Alcohol use: Not Currently    Drug use: Never       Review of Most Recent Wound Care-Related Labs:  Lab Results   Component  Value Date/Time    WBC 5.3 10/07/2022 03:11 AM    RBC 4.28 10/07/2022 03:11 AM    HGB 12.9 10/07/2022 03:11 AM    HCT 37.3 10/07/2022 03:11 AM    MCV 87.1 10/07/2022 03:11 AM    MCH 30.1 10/07/2022 03:11 AM    CREATININE 0.64 02/14/2023 09:15 AM    HGBA1C 6.1 02/14/2023 09:15 AM    .00 (H) 10/02/2022 01:27 PM          Today 2/20/23:  Has been soaking feet in vinegar and epsom salts to treat yellow and thickened toenails.  Has never been treated with oral or topical anti-infectives for toenail infection.  Denies numbness, tingling, and pain in feet/toes.  No pain in thighs or calves with laying down or with walking.  Continues doing daily callus care to bottom of right foot.  Unable to wear gel metatarsal off-loading pad, due to sweating/moisture.  Continues using lambs wool over right foot toes to prevent pressure on bony deformities.   Most recent HgbA1C was 6.1%.  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      1/18/23:  Both wounds on right foot are healed.  Continues sanding callus on bottom of right foot, which still has underlying bruise.  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  He is having all kinds of cardiac studies done for surgical clearance.  Continues using lambs wool over hammertoes, as taught.  Continues using gel metatarsal pad to off-load prominent bones in right forefoot.  Denies fevers, chills, wound odor, wound redness, wound pain, or yellow/green drainage.  No new rashes, lesions, or skin breakdown.     1/9/2023:  Bottle of hair conditioner fell on top of right foot a couple days ago and caused a wound.  Has been applying topically CMPD antibiotics (Vanc 5%, Tobra 4%, and Voricon 2%) since injury occurred.  No purulent drainage, odors, or significant redness around wound.  Has a new bruise over bottom of right foot, where prominent bone in forefoot is located.  Had to jump  up and catch her  last week and bruise developed in that area thereafter.  Is using lambs wool over right 2nd hammertoe.  Using gel metatarsal pads to right plantar forefoot; however, did not have that on, when she jumped up to grab .  No reported complications with wound care or current treatment plan.  Has all wound care supplies in home.  Denies fevers, chills, wound odor, wound redness, wound pain, or yellow/green drainage.  No new rashes, lesions, or skin breakdown.  Wanting to return to work next week, if possible.      12/12/2022:  Has been doing callus care to bottom of right foot, as instructed last visit.  Skin is soft where callus used to be.  No reported complications with wound care or current treatment plan.  Has all wound care supplies in home.  Denies fevers, chills, wound odor, wound redness, wound pain, or yellow/green drainage.  No new rashes, lesions, or skin breakdown.   Has been driving  to all of his therapy visits; not sure if she will be able to return to work at beginning of year due to taking care of him.  Says she will go to [facility] and see about getting an off-loading insert for right foot.  Has never heard about lambs wool for protecting toes, but is interested in checking that out.  Did not take this morning's Losartin, stating she takes that when she eats; did not eat yet.     12/1/2022:  Picking up  from [facility] later today; is concerned how increased task of his care will impact wound healing.  Hoping she can return to work at beginning of 2023, if wound heals by then.  Right foot wound has healed quite a bit since last visit; she is pleased about that.  Has not been sanding down large callus on bottom of right foot, due to open wound on foot.  No reported complications with wound care or current treatment plan.  Has all wound care supplies in home.  Denies fevers, chills, wound odor, wound redness, wound pain, or yellow/green drainage.  No new rashes,  "lesions, or skin breakdown.  Will call [facility] to f/u on orthotic evaluation.        REVIEW OF SYSTEMS:  Except as stated in HPI, all other 10 body systems normal.       Current Outpatient Medications   Medication Sig Dispense Refill    ammonium lactate 12 % Crea Apply 1 oz topically 2 (two) times daily. apply to affected area      blood sugar diagnostic Strp Check blood glucose in the morning and before each meal. 100 each 11    blood-glucose meter Misc Check blood glucose in the morning and before each meal. 1 each 1    lancets (ACCU-CHEK MULTICLIX LANCET) Misc Check blood glucose in the morning and before each meal. 100 each 11    losartan (COZAAR) 50 MG tablet Take 1 tablet (50 mg total) by mouth once daily. 90 tablet 3    metFORMIN (GLUMETZA) 1000 MG (MOD) 24hr tablet Take 1 tablet (1,000 mg total) by mouth 2 (two) times daily with meals. 180 tablet 3    pen needle, diabetic (BD ULTRA-FINE PAVITHRA PEN NEEDLE) 32 gauge x 5/32" Ndle 1 Bag by Misc.(Non-Drug; Combo Route) route 3 (three) times daily. 90 each 11    semaglutide (OZEMPIC) 0.25 mg or 0.5 mg(2 mg/1.5 mL) pen injector Inject 0.25 mg into the skin every 7 days. 1 pen 5     No current facility-administered medications for this encounter.         PHYSICAL EXAMINATION AND VITAL SIGNS:    Vitals:    02/20/23 0834   BP: 130/80   Pulse: 67   Resp: 20   Temp: 97.9 °F (36.6 °C)         There is no height or weight on file to calculate BMI.      General:  VSS; afebrile.  Well-nourished, in no acute distress.    Respiratory: Breathing even, quiet, and unlabored.  No coughing.  Cardiovascular:   No peripheral edema.   No hemosiderin staining or varicosities.   Scattered hair over dorsal toes.  Evidence of shaving legs.   DP pulses palpated.  PT pulses dopplered.  Toenails mycotic, dystrophic, and overgrown.      Musculoskeletal:   Right 3rd toe amputation surgical scar.  Significant bony deformity of right foot with widened forefoot, high arch with prominent " metatarsal heads, and severe 2nd hammertoe.  Hammertoes to left 2-4th toes; however, not as severe.  Normal dorsiflexion and flexion in bilateral ankles and hallux toes.  Intrinsic minus feet bilaterally.    Neurologic:  A&O X 3.  Cranial nerves grossly intact.   Normal monofilament testing bilaterally.  No LOPS.   Psychiatric:  Calm, cooperative.    Responses appropriate.   Integumentary:      Nine overgrown toenails.  All 4 toenails on right foot dystrophic and mycotic, with foul odor with debridement today.  Two mycotic and dystrophic toenails on left foot (hallux and 2nd) with foul odor with debridement today.      Callus to right plantar 1st met head:  No pre-ulcerative skin breakdown with shaving of callus today.                      ASSESSMENT AND PLAN:    Type 2 diabetes with skin complication:  S/P right 3rd toe amputation @ [facility] on 10/5/2022 due to osteomyelitis.  Diabetes well-controlled with Metformin and Ozempic.  Diabetic foot care teaching reinforced.  Instructed to stop soaking feet in vinegar and epsom salts, with rationale.      Right foot deformity:  MRI ruled out Charcot foot, per Dr. Caballero, podiatry.  Foot with significant arthritic bony deformities.   Loss of intrinsic mucle ROM bilaterally.  Full dorsiflexion of ankles and hallux.  Normal monofilament testing bilaterally; no LOPS.  Planned surgical off-loading in summer 2023, after her 's scheduled 4/2023 hip surgery.      Mycotic Toenails X 6:  Teaching provided on underlying cause of condition, s/s of condition, complications, and treatment options of condition.    RX:  CMPD Voricon 2.67%/Vanc 6.67%, and Cipro 2% nail suspension in DSMO.  Disp 60 mL with 5 refills.  May disp 15 mls per pt request.  Apply up to 2 mls to affect nails twice/day.    Teaching provided on new medication, expected outcomes of medication, how to administer medication, and possible s/e of medications.    RX sent to [facility]    Routine  Diabetic Foot Care:  Procedure Today:  Debridement of 6 mycotic, dystrphic, and overgrown toenails.   Cutting, filing, and buffing of 3 toenails.  Rationale:  Overgrown, mycotic, and dystrophic toenails, as patient presented today, can lead to diabetic foot/toe ulcers, osteomyelitis, and lower limb amputations.   Informed verbal consent obtained.  Using standard podiatry clippers, Dremmel, and [place] boards, I excised infected nail from six toes; and, I cut and sanded 3 toenails.  No bleeding or discomfort during procedure.  Patient tolerated procedure without complications.      Class A Findings:  Amputated toe  Q7 - modifier    Callus of Right plantar foot:  Procedure Today: Shaving of one callus  Rationale: calluses can lead to wounds, cellulitis, osteomyelitis, and lower limb amputations.     Informed verbal consent obtained.   Using Dremmel, I gently shaved one callus to healthy soft tissue. No bleeding or discomfort with procedure.        Xerosis of bilateral feet:  Improved significantly.    Prescribed Lac-Hydrin 12%, followed by thin layer of Vaseline twice/day.   CPM    Hammertoes:  Severe deformity of right 2nd toe.    Being followed by Dr. Caballero for surgical off-loading eval, with tentative surgery in summer 2023.                     Return to clinic in one month for re-eval of mycotic toenails.  Teaching reinforced on s/s to call wound clinic for promptly.

## 2023-03-09 LAB — NONINV COLON CA DNA+OCC BLD SCRN STL QL: NEGATIVE

## 2023-03-20 ENCOUNTER — HOSPITAL ENCOUNTER (OUTPATIENT)
Dept: WOUND CARE | Facility: HOSPITAL | Age: 58
Discharge: HOME | End: 2023-03-20
Attending: NURSE PRACTITIONER
Payer: COMMERCIAL

## 2023-03-20 VITALS
RESPIRATION RATE: 16 BRPM | SYSTOLIC BLOOD PRESSURE: 135 MMHG | OXYGEN SATURATION: 99 % | TEMPERATURE: 98 F | DIASTOLIC BLOOD PRESSURE: 88 MMHG | HEART RATE: 68 BPM

## 2023-03-20 DIAGNOSIS — Z87.39 HISTORY OF OSTEOMYELITIS: ICD-10-CM

## 2023-03-20 DIAGNOSIS — B35.1 MYCOTIC TOENAILS: Primary | ICD-10-CM

## 2023-03-20 DIAGNOSIS — M20.42 ACQUIRED HAMMERTOES OF BOTH FEET: ICD-10-CM

## 2023-03-20 DIAGNOSIS — Z89.421 HISTORY OF AMPUTATION OF LESSER TOE, RIGHT: ICD-10-CM

## 2023-03-20 DIAGNOSIS — M20.41 ACQUIRED HAMMERTOES OF BOTH FEET: ICD-10-CM

## 2023-03-20 DIAGNOSIS — E11.628 TYPE 2 DIABETES MELLITUS WITH OTHER SKIN COMPLICATION, WITHOUT LONG-TERM CURRENT USE OF INSULIN: ICD-10-CM

## 2023-03-20 DIAGNOSIS — M21.961 ACQUIRED DEFORMITY OF RIGHT FOOT: ICD-10-CM

## 2023-03-20 PROCEDURE — 99212 PR OFFICE/OUTPT VISIT, EST, LEVL II, 10-19 MIN: ICD-10-PCS | Mod: ,,, | Performed by: NURSE PRACTITIONER

## 2023-03-20 PROCEDURE — 99211 OFF/OP EST MAY X REQ PHY/QHP: CPT

## 2023-03-20 PROCEDURE — 99212 OFFICE O/P EST SF 10 MIN: CPT | Mod: ,,, | Performed by: NURSE PRACTITIONER

## 2023-03-20 NOTE — PROGRESS NOTES
TODAY'S VISIT NOTE WAS IMPORTED FROM LAST WOUND CLINIC VISIT OF 2/20/23.  I ATTEST THAT I REVIEWED THE HPI, ROS, LABS, WOUND-RELATED IMAGING, PHYSICAL EXAMINATION, EVALUATION AND PLAN SECTIONS OF IMPORTED NOTE AND REVISED TODAY'S VISIT NOTE TO REFLECT TODAY'S NEW ASSESSMENT FINDINGS AND TODAY'S UPDATES TO WOUND TREATMENT PLAN.          CHIEF COMPLAINT:    Infection to toenails    HISTORY OF  PRESENT ILLNESS:  57 y.o. White female being seen today for follow-up of infected toenails.  Last visit with PCP, Dr. Lawrence, was on 2/14/23.  Hx of amputated right lesser toe, which was done during [facility] hospitalization 10/2/2022 - 10/7/2022, due to osteomyelitis.  Followed by Dr. Caballero, podiatrist, for left foot/toe deformities.  Planned surgical off-loading sometime in summer 2024, following 's hip surgery in April 23.  Has been referred to [facility] for evaluation and treatment of right foot for brace versus AFO.  History includes:        Past Medical History:   Diagnosis Date    Acquired deformity of right foot 10/10/2022    Acquired hammertoes of both feet 10/10/2022    History of osteomyelitis 10/10/2022    Open wound of lesser toe of right foot 10/10/2022    Primary hypertension 10/10/2022    Status post amputation of lesser toe of right foot 10/10/2022    Type 2 diabetes mellitus with skin complication, without long-term current use of insulin 10/10/2022      Past Surgical History:   Procedure Laterality Date    TOE AMPUTATION Right 10/5/2022    Procedure: AMPUTATION, right third toe;  Surgeon: Glne Roth MD;  Location: [facility];  Service: General;  Laterality: Right;      Social History     Socioeconomic History    Marital status:    Tobacco Use    Smoking status: Never     Passive exposure: Never    Smokeless tobacco: Never   Substance and Sexual Activity    Alcohol use: Not Currently    Drug use: Never       Review of Most Recent Wound Care-Related Labs:  Lab Results   Component  Value Date/Time    WBC 5.3 10/07/2022 03:11 AM    RBC 4.28 10/07/2022 03:11 AM    HGB 12.9 10/07/2022 03:11 AM    HCT 37.3 10/07/2022 03:11 AM    MCV 87.1 10/07/2022 03:11 AM    MCH 30.1 10/07/2022 03:11 AM    CREATININE 0.64 02/14/2023 09:15 AM    HGBA1C 6.1 02/14/2023 09:15 AM    .00 (H) 10/02/2022 01:27 PM          Today 3/20/23:  Takes almost 2 hours for nail polish to dry.  She is applying it as best she can.  Agreeable to applying clear plastic wrap over toenails before wearing socks.  Continues using lambs wool over bony deformities of toes to prevent skin breakdown.  Doing daily callus care to bottom of right foot.  No new rashes, lesions, or skin breakdown.     2/20/23:  Has been soaking feet in vinegar and epsom salts to treat yellow and thickened toenails.  Has never been treated with oral or topical anti-infectives for toenail infection.  Denies numbness, tingling, and pain in feet/toes.  No pain in thighs or calves with laying down or with walking.  Continues doing daily callus care to bottom of right foot.  Unable to wear gel metatarsal off-loading pad, due to sweating/moisture.  Continues using lambs wool over right foot toes to prevent pressure on bony deformities.   Most recent HgbA1C was 6.1%.  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      1/18/23:  Both wounds on right foot are healed.  Continues sanding callus on bottom of right foot, which still has underlying bruise.  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  He is having all kinds of cardiac studies done for surgical clearance.  Continues using lambs wool over hammertoes, as taught.  Continues using gel metatarsal pad to off-load prominent bones in right forefoot.  Denies fevers, chills, wound odor, wound redness, wound pain, or yellow/green drainage.  No new rashes, lesions, or skin breakdown.     1/9/2023:  Bottle of  hair conditioner fell on top of right foot a couple days ago and caused a wound.  Has been applying topically CMPD antibiotics (Vanc 5%, Tobra 4%, and Voricon 2%) since injury occurred.  No purulent drainage, odors, or significant redness around wound.  Has a new bruise over bottom of right foot, where prominent bone in forefoot is located.  Had to jump up and catch her  last week and bruise developed in that area thereafter.  Is using lambs wool over right 2nd hammertoe.  Using gel metatarsal pads to right plantar forefoot; however, did not have that on, when she jumped up to grab .  No reported complications with wound care or current treatment plan.  Has all wound care supplies in home.  Denies fevers, chills, wound odor, wound redness, wound pain, or yellow/green drainage.  No new rashes, lesions, or skin breakdown.  Wanting to return to work next week, if possible.      12/12/2022:  Has been doing callus care to bottom of right foot, as instructed last visit.  Skin is soft where callus used to be.  No reported complications with wound care or current treatment plan.  Has all wound care supplies in home.  Denies fevers, chills, wound odor, wound redness, wound pain, or yellow/green drainage.  No new rashes, lesions, or skin breakdown.   Has been driving  to all of his therapy visits; not sure if she will be able to return to work at beginning of year due to taking care of him.  Says she will go to [facility] and see about getting an off-loading insert for right foot.  Has never heard about lambs wool for protecting toes, but is interested in checking that out.  Did not take this morning's Losartin, stating she takes that when she eats; did not eat yet.     12/1/2022:  Picking up  from [facility] later today; is concerned how increased task of his care will impact wound healing.  Hoping she can return to work at beginning of 2023, if wound heals by then.  Right foot wound has healed quite a  "bit since last visit; she is pleased about that.  Has not been sanding down large callus on bottom of right foot, due to open wound on foot.  No reported complications with wound care or current treatment plan.  Has all wound care supplies in home.  Denies fevers, chills, wound odor, wound redness, wound pain, or yellow/green drainage.  No new rashes, lesions, or skin breakdown.  Will call [facility] to f/u on orthotic evaluation.        REVIEW OF SYSTEMS:  Except as stated in HPI, all other 10 body systems normal.       Current Outpatient Medications   Medication Sig Dispense Refill    ammonium lactate 12 % Crea Apply 1 oz topically 2 (two) times daily. apply to affected area      blood sugar diagnostic Strp Check blood glucose in the morning and before each meal. 100 each 11    blood-glucose meter Misc Check blood glucose in the morning and before each meal. 1 each 1    lancets (ACCU-CHEK MULTICLIX LANCET) Misc Check blood glucose in the morning and before each meal. 100 each 11    losartan (COZAAR) 50 MG tablet Take 1 tablet (50 mg total) by mouth once daily. 90 tablet 3    metFORMIN (GLUMETZA) 1000 MG (MOD) 24hr tablet Take 1 tablet (1,000 mg total) by mouth 2 (two) times daily with meals. 180 tablet 3    pen needle, diabetic (BD ULTRA-FINE PAVITHRA PEN NEEDLE) 32 gauge x 5/32" Ndle 1 Bag by Misc.(Non-Drug; Combo Route) route 3 (three) times daily. 90 each 11     No current facility-administered medications for this encounter.         PHYSICAL EXAMINATION AND VITAL SIGNS:    Vitals:    03/20/23 0843   BP: 135/88   Pulse: 68   Resp: 16   Temp: 97.8 °F (36.6 °C)       General:  VSS; afebrile.  Well-nourished, in no acute distress.    Respiratory: Breathing even, quiet, and unlabored.  No coughing.  Cardiovascular:   No peripheral edema.   No hemosiderin staining or varicosities.   Scattered hair over dorsal toes.  Evidence of shaving legs.   DP pulses palpated.  Toenails mycotic, dystrophic, and well-trimmed.   "    Musculoskeletal:   Right 3rd toe amputation surgical scar.  Significant bony deformity of right foot with widened forefoot, high arch with prominent metatarsal heads, and severe 2nd hammertoe.  Hammertoes to left 2-4th toes; however, not as severe.     Neurologic:  A&O X 3.  Cranial nerves grossly intact.      Psychiatric:  Calm, cooperative.    Responses appropriate.   Integumentary:      Mycotic toenails:  Hallux toenails not as yellow and thickened as last visit.  2nd left toenail discoloration is beginning to lighten up from last visit, as well.  Toenails well-trimmed.      Callus to right plantar 1st met head:  Stable today.  No paring or sanding needed.                            ASSESSMENT AND PLAN:    Type 2 diabetes with skin complication:  S/P right 3rd toe amputation @ [facility] on 10/5/2022 due to osteomyelitis.  Diabetes well-controlled with Metformin and Ozempic.  Diabetic foot care teaching reinforced, including avoidance of soaking feet, with rationale.       Right foot deformity:  MRI ruled out Charcot foot, per Dr. Caballero, podiatry.  Foot with significant arthritic bony deformities.   Loss of intrinsic mucle ROM bilaterally.  Full dorsiflexion of ankles and hallux.  Normal monofilament testing bilaterally; no LOPS.  Planned surgical off-loading in summer 2023, after her 's scheduled 4/2023 hip surgery.      Mycotic Toenails X 6:  Nail suspension taking approx 2 hours to dry.  Instructed she can cover nails with clear wrap and apply socks to keep medication on toenails.   Teaching reinforced on underlying cause of condition, s/s of condition, complications, and treatment options of condition.    Prescribed CMPD Voricon 2.67%/Vanc 6.67%, and Cipro 2% nail suspension in DSMO twice/day.  Script being filled by [facility].     Routine Diabetic Foot Care:  Last done 2/20/23    Callus of Right plantar foot:  Stable today; no paring or sanding required.  Continued daily callus care at  home.     Xerosis of bilateral feet:  Stable.    Prescribed Lac-Hydrin 12%, followed by thin layer of Vaseline twice/day.   CPM    Hammertoes:  Severe deformity of right 2nd toe.    Being followed by Dr. Caballero for surgical off-loading eval, with tentative surgery in summer 2023.                     Return to clinic in two months for routine diabetic foot care.  Teaching reinforced on s/s to call wound clinic for promptly.

## 2023-05-01 LAB
LEFT EYE DM RETINOPATHY: POSITIVE
RIGHT EYE DM RETINOPATHY: POSITIVE

## 2023-05-15 ENCOUNTER — OFFICE VISIT (OUTPATIENT)
Dept: FAMILY MEDICINE | Facility: CLINIC | Age: 58
End: 2023-05-15
Payer: COMMERCIAL

## 2023-05-15 VITALS
TEMPERATURE: 98 F | SYSTOLIC BLOOD PRESSURE: 112 MMHG | HEIGHT: 67 IN | DIASTOLIC BLOOD PRESSURE: 70 MMHG | BODY MASS INDEX: 27 KG/M2 | WEIGHT: 172 LBS | HEART RATE: 85 BPM | OXYGEN SATURATION: 97 % | RESPIRATION RATE: 17 BRPM

## 2023-05-15 DIAGNOSIS — I10 PRIMARY HYPERTENSION: ICD-10-CM

## 2023-05-15 DIAGNOSIS — M21.962 DEFORMITY OF LEFT FOOT: ICD-10-CM

## 2023-05-15 DIAGNOSIS — E11.628 TYPE 2 DIABETES MELLITUS WITH OTHER SKIN COMPLICATION, WITHOUT LONG-TERM CURRENT USE OF INSULIN: Primary | ICD-10-CM

## 2023-05-15 LAB — HBA1C MFR BLD: 6.8 %

## 2023-05-15 PROCEDURE — 99214 OFFICE O/P EST MOD 30 MIN: CPT | Mod: PBBFAC

## 2023-05-15 PROCEDURE — 83036 HEMOGLOBIN GLYCOSYLATED A1C: CPT | Mod: PBBFAC

## 2023-05-15 RX ORDER — LOSARTAN POTASSIUM 50 MG/1
50 TABLET ORAL DAILY
Qty: 90 TABLET | Refills: 3 | Status: SHIPPED | OUTPATIENT
Start: 2023-05-15 | End: 2023-08-16

## 2023-05-15 RX ORDER — METFORMIN HYDROCHLORIDE 1000 MG/1
1000 TABLET, FILM COATED, EXTENDED RELEASE ORAL 2 TIMES DAILY WITH MEALS
Qty: 180 TABLET | Refills: 3 | Status: SHIPPED | OUTPATIENT
Start: 2023-05-15 | End: 2023-08-16

## 2023-05-15 NOTE — PROGRESS NOTES
[facility] Office Visit Note    Subjective:       Patient ID: Maribel Araiza is a 57 y.o. female.    HPI:  57 y.o. female presents to [facility] clinic for f/u DM, HTN.    DM  - metformin  - a1c 6.1 2/2023, , not on statin - says no  - fasting cbgs ranging 120-135  - stopped ozempic - was having neck pain and fatigue, stopped last visit  - having hard time eating right foods - not enough time to cook for self since  hospitalized for hip replacement while still caring for son with TBI    HTN  - losartan 50mg daily  - bp at goal  - denies dizziness, lightheadedness, CP, SOB, nausea, vomiting    L Foot swelling  - onset 1wk; located on dorsal surface along tarsal-metatarsal joint.   - attributes to h/o high arches - that arches fell and bone out of alignment; was told this is the case for R foot with similar presentation; R foot evaluated by podiatry Dr. Caballero with plan for surgical repair at pt's convenience (pt waiting for  to be dc'd from hospital, currently unable to call for appt due to busy schedule). Charcot foot ruled out and pt was dx'd OA and loss of alignment in R foot.  - denies any trauma or injury to foot; denies popping or cracking sensation at time of swelling. 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  - plans to call Ada over next week or 2 for evaluation    Health Maintenance  Pap Smear: declined today, amenable at later time  Mammogram: 12/2022, bi-rads 1 bilat  Colon Cancer: cologard 3/2023 neg    Review of Systems:  Gen: denies fever and chills  Resp: denies cough, shortness of breath and wheezing  CV: denies chest pain and palpitations  GI: denies nausea, vomiting, abdominal pain  MSK: see below ROS questionnaire    ROS questionnaire reviewed:  neck pain: Yes - resolved with discontinuation of ozempic  joint swelling: Yes - see foot swelling  "above  arthralgias: Yes - see foot swelling above    Objective:      /70 (BP Location: Right arm, Patient Position: Sitting, BP Method: Medium (Automatic))   Pulse 85   Temp 98 °F (36.7 °C) (Oral)   Resp 17   Ht 5' 7.01" (1.702 m)   Wt 78 kg (172 lb)   SpO2 97%   BMI 26.93 kg/m²   Physical Exam    Physical Exam:  Gen: alert and oriented, NAD  CV: RRR, S1 and S2 present, no murmurs appreciated on exam  Resp: CTA bilaterally, breathing nonlabored on room air   Abd: Soft, non-distended, non-tender  Foot exam: DP 1-2+ bilat   - R foot with gross deformity (appears swollen) at tarsal-metatarsal joint; neg warmth or redness, nontender to palpation, neg fluctuance. 3rd digit not present on exam (consisted with h/o 3rd digit amputation)   - L foot with gross swelling deformity at tarsal-metatarsal joint, neg warmth, redness, tenderness, fluctuance. No ROM deficit on plantar- and dorsiflexion. Sensation intact. No tenderness to arch of plantar surface elicited on exam    Current Outpatient Medications   Medication Instructions    ammonium lactate 12 % Crea 1 oz, Topical (Top), 2 times daily, apply to affected area    blood sugar diagnostic Strp Check blood glucose in the morning and before each meal.    blood-glucose meter Misc Check blood glucose in the morning and before each meal.    lancets (ACCU-CHEK MULTICLIX LANCET) Misc Check blood glucose in the morning and before each meal.    losartan (COZAAR) 50 mg, Oral, Daily    metFORMIN (GLUMETZA) 1,000 mg, Oral, 2 times daily with meals    pen needle, diabetic (BD ULTRA-FINE PAVITHRA PEN NEEDLE) 32 gauge x 5/32" Ndle 1 Bag, Misc.(Non-Drug; Combo Route), 3 times daily        Assessment/Plan:     1. Type 2 diabetes mellitus with other skin complication, without long-term current use of insulin  POCT HEMOGLOBIN A1C      2. Primary hypertension        3. Deformity of left foot            Poc a1c in clinic today - 6.8. Still at goal of less than 7; however increased from " "6.1; consistent with h/o stopping ozempic. B12 wnl in 2/2023, cont metformin. Pt plans to return of prev diet pending  dc and recovery. Not amenable to diabetic nutrition consultation at this time due to time constraints.  Pt candidate for prevention with statin, however, strongly declines initiation of statin at this time. Trying to be on as little medications as possible; prefers to wait until "absolutely necessary" prior to initiating.  Losartan refilled; BP at goal, cont losartan for now  Nontraumatic foot deformity; ddx includes OA vs charcot-foot vs occult fracture. Pt to f/u with [facility]; instructed pt to call Ada's office for evaluation vs possible specific XR views preferred by podiatry; however, if pt unable to get seen within next few weeks, can get XR done at [facility] to investigate bony pathology. Will f/u in 1-2 wks via phone    "

## 2023-05-16 NOTE — PROGRESS NOTES
Discussed with resident at time of encounter 05-15-23.    Resident's note reviewed 05-16-23.  Agree with assessment; plan of care appropriate.  Professional services provided in an outpatient primary care center affiliated with a [facility] institution.

## 2023-05-22 ENCOUNTER — HOSPITAL ENCOUNTER (OUTPATIENT)
Dept: WOUND CARE | Facility: HOSPITAL | Age: 58
Discharge: HOME | End: 2023-05-22
Attending: NURSE PRACTITIONER
Payer: COMMERCIAL

## 2023-05-22 VITALS
SYSTOLIC BLOOD PRESSURE: 152 MMHG | OXYGEN SATURATION: 100 % | TEMPERATURE: 98 F | DIASTOLIC BLOOD PRESSURE: 84 MMHG | HEART RATE: 71 BPM | RESPIRATION RATE: 18 BRPM

## 2023-05-22 DIAGNOSIS — L60.2 OVERGROWN TOENAILS: ICD-10-CM

## 2023-05-22 DIAGNOSIS — M20.42 ACQUIRED HAMMERTOES OF BOTH FEET: ICD-10-CM

## 2023-05-22 DIAGNOSIS — L84 CALLUS OF FOOT: ICD-10-CM

## 2023-05-22 DIAGNOSIS — M21.962 ACQUIRED DEFORMITY OF LEFT FOOT: ICD-10-CM

## 2023-05-22 DIAGNOSIS — Z87.39 HISTORY OF OSTEOMYELITIS: ICD-10-CM

## 2023-05-22 DIAGNOSIS — Z89.421 HISTORY OF AMPUTATION OF LESSER TOE, RIGHT: ICD-10-CM

## 2023-05-22 DIAGNOSIS — M20.41 ACQUIRED HAMMERTOES OF BOTH FEET: ICD-10-CM

## 2023-05-22 DIAGNOSIS — E11.9 ENCOUNTER FOR COMPREHENSIVE DIABETIC FOOT EXAMINATION, TYPE 2 DIABETES MELLITUS: Primary | ICD-10-CM

## 2023-05-22 DIAGNOSIS — M21.961 ACQUIRED DEFORMITY OF RIGHT FOOT: ICD-10-CM

## 2023-05-22 DIAGNOSIS — B35.1 MYCOTIC TOENAILS: ICD-10-CM

## 2023-05-22 DIAGNOSIS — E11.628 TYPE 2 DIABETES MELLITUS WITH OTHER SKIN COMPLICATION, WITHOUT LONG-TERM CURRENT USE OF INSULIN: ICD-10-CM

## 2023-05-22 DIAGNOSIS — E78.2 MIXED HYPERLIPIDEMIA: ICD-10-CM

## 2023-05-22 PROCEDURE — 11721 DEBRIDE NAIL 6 OR MORE: CPT

## 2023-05-22 PROCEDURE — 11721 DEBRIDE NAIL 6 OR MORE: CPT | Mod: ,,, | Performed by: NURSE PRACTITIONER

## 2023-05-22 PROCEDURE — 99212 PR OFFICE/OUTPT VISIT, EST, LEVL II, 10-19 MIN: ICD-10-PCS | Mod: 25,,, | Performed by: NURSE PRACTITIONER

## 2023-05-22 PROCEDURE — 99212 OFFICE O/P EST SF 10 MIN: CPT | Mod: 25,,, | Performed by: NURSE PRACTITIONER

## 2023-05-22 PROCEDURE — 11721 PR DEBRIDEMENT OF NAILS, 6 OR MORE: ICD-10-PCS | Mod: ,,, | Performed by: NURSE PRACTITIONER

## 2023-05-22 NOTE — PROGRESS NOTES
TODAY'S VISIT NOTE WAS IMPORTED FROM LAST WOUND CLINIC VISIT OF 3/20/23.  I ATTEST THAT I REVIEWED THE HPI, ROS, LABS, WOUND-RELATED IMAGING, PHYSICAL EXAMINATION, EVALUATION AND PLAN SECTIONS OF IMPORTED NOTE AND REVISED TODAY'S VISIT NOTE TO REFLECT TODAY'S NEW ASSESSMENT FINDINGS AND TODAY'S UPDATES TO WOUND TREATMENT PLAN.          CHIEF COMPLAINT:    Routine diabetic foot care      HISTORY OF  PRESENT ILLNESS:  57 y.o. White female being seen today for routine diabetic foot care.    Last visit with PCP, Dr. Anton, was on 5/15/23.  Hx of amputated right lesser toe, which was done during [facility] hospitalization 10/2/2022 - 10/7/2022, due to osteomyelitis.  Followed by Dr. Caballero, podiatrist, for foot/toe deformities.  Planned surgical off-loading sometime in summer 2024, following 's hip surgery in April 23.  Has been referred to [facility] for evaluation and treatment of right foot for brace versus AFO.  History includes:        Past Medical History:   Diagnosis Date    Acquired deformity of right foot 10/10/2022    Acquired hammertoes of both feet 10/10/2022    History of osteomyelitis 10/10/2022    Open wound of lesser toe of right foot 10/10/2022    Primary hypertension 10/10/2022    Status post amputation of lesser toe of right foot 10/10/2022    Type 2 diabetes mellitus with skin complication, without long-term current use of insulin 10/10/2022      Past Surgical History:   Procedure Laterality Date    TOE AMPUTATION Right 10/5/2022    Procedure: AMPUTATION, right third toe;  Surgeon: Glen Roth MD;  Location: [facility];  Service: General;  Laterality: Right;      Social History     Socioeconomic History    Marital status:    Tobacco Use    Smoking status: Never     Passive exposure: Never    Smokeless tobacco: Never   Substance and Sexual Activity    Alcohol use: Not Currently    Drug use: Never       Review of Most Recent Wound Care-Related Labs:  Lab Results   Component Value  Date/Time    WBC 5.3 10/07/2022 03:11 AM    RBC 4.28 10/07/2022 03:11 AM    HGB 12.9 10/07/2022 03:11 AM    HCT 37.3 10/07/2022 03:11 AM    MCV 87.1 10/07/2022 03:11 AM    MCH 30.1 10/07/2022 03:11 AM    CREATININE 0.64 02/14/2023 09:15 AM    HGBA1C 6.1 02/14/2023 09:15 AM    .00 (H) 10/02/2022 01:27 PM          Today 5/22/23:  Having bony changes in left foot.  Top of foot is much higher than before; and, she feels like her arch is falling.  Denies redness and pain in foot; however, has noticed increased swelling in foot.  Plans to reach out to Dr. Caballero to see if he wants to see her now.  Has not had foot xray done.  No trauma or falls.  Continues doing daily callus care to bottom of right foot.  Checks feet/toes daily.  No new rashes, lesions, or skin breakdown.   Applying toenail suspension twice/day, as prescribed.   States she was able to lower cholesterol level without medication; and, she does not want to add any new medications.  Stopped Ozempic due to it making her feel badly.  Does not want to take Metformin; however, is still taking that.   Went to air show in [place], and [employer] in [place], this weekend, where she sustained sunburn.      3/20/23:  Takes almost 2 hours for nail polish to dry.  She is applying it as best she can.  Agreeable to applying clear plastic wrap over toenails before wearing socks.  Continues using lambs wool over bony deformities of toes to prevent skin breakdown.  Doing daily callus care to bottom of right foot.  No new rashes, lesions, or skin breakdown.     2/20/23:  Has been soaking feet in vinegar and epsom salts to treat yellow and thickened toenails.  Has never been treated with oral or topical anti-infectives for toenail infection.  Denies numbness, tingling, and pain in feet/toes.  No pain in thighs or calves with laying down or with walking.  Continues doing daily callus care to bottom of right foot.  Unable to wear gel metatarsal off-loading  pad, due to sweating/moisture.  Continues using lambs wool over right foot toes to prevent pressure on bony deformities.   Most recent HgbA1C was 6.1%.  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      1/18/23:  Both wounds on right foot are healed.  Continues sanding callus on bottom of right foot, which still has underlying bruise.  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  He is having all kinds of cardiac studies done for surgical clearance.  Continues using lambs wool over hammertoes, as taught.  Continues using gel metatarsal pad to off-load prominent bones in right forefoot.  Denies fevers, chills, wound odor, wound redness, wound pain, or yellow/green drainage.  No new rashes, lesions, or skin breakdown.     1/9/2023:  Bottle of hair conditioner fell on top of right foot a couple days ago and caused a wound.  Has been applying topically CMPD antibiotics (Vanc 5%, Tobra 4%, and Voricon 2%) since injury occurred.  No purulent drainage, odors, or significant redness around wound.  Has a new bruise over bottom of right foot, where prominent bone in forefoot is located.  Had to jump up and catch her  last week and bruise developed in that area thereafter.  Is using lambs wool over right 2nd hammertoe.  Using gel metatarsal pads to right plantar forefoot; however, did not have that on, when she jumped up to grab .  No reported complications with wound care or current treatment plan.  Has all wound care supplies in home.  Denies fevers, chills, wound odor, wound redness, wound pain, or yellow/green drainage.  No new rashes, lesions, or skin breakdown.  Wanting to return to work next week, if possible.      12/12/2022:  Has been doing callus care to bottom of right foot, as instructed last visit.  Skin is soft where callus used to be.  No reported complications with wound care or current  treatment plan.  Has all wound care supplies in home.  Denies fevers, chills, wound odor, wound redness, wound pain, or yellow/green drainage.  No new rashes, lesions, or skin breakdown.   Has been driving  to all of his therapy visits; not sure if she will be able to return to work at beginning of year due to taking care of him.  Says she will go to [facility] and see about getting an off-loading insert for right foot.  Has never heard about lambs wool for protecting toes, but is interested in checking that out.  Did not take this morning's Losartin, stating she takes that when she eats; did not eat yet.     12/1/2022:  Picking up  from [facility] later today; is concerned how increased task of his care will impact wound healing.  Hoping she can return to work at beginning of 2023, if wound heals by then.  Right foot wound has healed quite a bit since last visit; she is pleased about that.  Has not been sanding down large callus on bottom of right foot, due to open wound on foot.  No reported complications with wound care or current treatment plan.  Has all wound care supplies in home.  Denies fevers, chills, wound odor, wound redness, wound pain, or yellow/green drainage.  No new rashes, lesions, or skin breakdown.  Will call [facility] to f/u on orthotic evaluation.        REVIEW OF SYSTEMS:  Except as stated in HPI, all other 10 body systems normal.       Current Outpatient Medications   Medication Sig Dispense Refill    ammonium lactate 12 % Crea Apply 1 oz topically 2 (two) times daily. apply to affected area      blood sugar diagnostic Strp Check blood glucose in the morning and before each meal. 100 each 11    blood-glucose meter Misc Check blood glucose in the morning and before each meal. 1 each 1    lancets (ACCU-CHEK MULTICLIX LANCET) Misc Check blood glucose in the morning and before each meal. 100 each 11    losartan (COZAAR) 50 MG tablet Take 1 tablet (50 mg total) by mouth once daily. 90  "tablet 3    metFORMIN (GLUMETZA) 1000 MG (MOD) 24hr tablet Take 1 tablet (1,000 mg total) by mouth 2 (two) times daily with meals. 180 tablet 3    pen needle, diabetic (BD ULTRA-FINE PAVITHRA PEN NEEDLE) 32 gauge x 5/32" Ndle 1 Bag by Misc.(Non-Drug; Combo Route) route 3 (three) times daily. 90 each 11     No current facility-administered medications for this encounter.         PHYSICAL EXAMINATION AND VITAL SIGNS:    Vitals:    05/22/23 0824   BP: (!) 152/84   Pulse: 71   Resp: 18   Temp: 97.8 °F (36.6 °C)       General:   Hypertensive, with diabetes, asymptomatic with; afebrile.  Well-nourished, in no acute distress.    Respiratory: Breathing even, quiet, and unlabored.  No coughing.  Cardiovascular:     Moderate non-pitting edema around left medial/lateral ankles.  No hemosiderin staining or varicosities.   Scattered hair over dorsal toes.  Evidence of shaving legs.   DP pulses palpated.  Toenails mycotic, dystrophic, and overgrown.      Musculoskeletal:   Right 3rd toe amputation surgical scar.  Significant bony deformity of right foot with widened forefoot, high arch with prominent metatarsal heads, and severe 2nd hammertoe.  Hammertoes to left 2-4th toes; however, not as severe.   Left dorsal foot deformity with prominent bones, and exaggerated arch.  No associated erythema or point tenderness.    Neurologic:  A&O X 3.  Cranial nerves grossly intact.  Abnormal monofilament testing:  complete LOPS in left foot.  Normal sensation in all areas of right foot.    Psychiatric:  Calm, cooperative; however, did become visibly upset with discussion of cholesterol levels.    Responses appropriate.   Integumentary:      Mycotic toenails:  Mild odor persists in left hallux toenail.  No odor from right hallux toenail.  Toenails overgrown and thickened.      Callus to right plantar 1st met head:  Stable today.  No paring or sanding needed.      Severe sunburn to face and thighs.  Skin intact.                          ASSESSMENT " AND PLAN:    Encounter for routine diabetic foot care, without insulin:   Stopped taking Ozempic due to s/e.  Continues taking Metformin.  S/P right 3rd toe amputation @ [facility] on 10/5/2022 due to osteomyelitis.  Diabetes well-controlled with Metformin and Ozempic.  Diabetic foot care teaching reinforced, including avoidance of soaking feet, with rationale.     Q7 qualifier with hx of amputated toe  I attest that I reviewed patient's last labs, and chronic disease diagnoses, with her along with focused attention towards modifiable, and non-modifiable, risk factors along with health-focused interventions she can implement to reduce renal, vascular, and skin complications in an effort to prevent lower limb amputations.   Dietary, medication, and level of activity interventions taught.      Right foot deformity:  MRI ruled out Charcot foot, per Dr. Caballero, podiatry.  Foot with significant arthritic bony deformities.   Loss of intrinsic mucle ROM bilaterally.  Full dorsiflexion of ankles and hallux.  Normal monofilament testing bilaterally; no LOPS.  Planned surgical off-loading in summer 2023, after her 's scheduled 4/2023 hip surgery.      Left foot deformity:  New since last visit.  Teaching provided on risks, s/s of, and treatment options for Charcot foot fractures.  Encouraged her to reach out to Dr. Caballero, podiatrist, as he would most likely want to see her ASAP, as Charcot foot fractures are a podiatric emergency, with rationale.      Mycotic Toenails:  Improving.  Adherent with twice/day application now.     Prescribed CMPD Voricon 2.67%/Vanc 6.67%, and Cipro 2% nail suspension in DSMO twice/day.  Script being filled by [facility].                                     PROCEDURE NOTE    Procedure Today:  Debridement of 9 toenails.  Rationale:  Overgrown and infected toenails, as patient presented today, can lead to diabetic foot/toe ulcers, osteomyelitis, and lower limb amputations.    Informed verbal consent obtained.  Using standard podiatry clippers, [facility], and [place] boards, I excised infected nail from 9 toenails.   No bleeding or discomfort during procedure.  Patient tolerated procedure without complications.      Callus of Right plantar foot:  Stable today; no paring or sanding required.  Continued daily callus care at home.     Xerosis of bilateral feet:  Stable.    Prescribed Lac-Hydrin 12%, followed by thin layer of Vaseline twice/day.   CPM    Hammertoes:  Severe deformity of right 2nd toe.    Being followed by Dr. Caballero for surgical off-loading eval, with tentative surgery in summer 2023.       Hyperlipidemia:  I reviewed her last lipid panel with her, as she told Dr. Anton she did not want to start a statin.  Reviewed normal LDL levels, as well as goal LDL levels with diabetes, and her last LDL level with her.  Ms. Araiza became visibly upset during this conversation, so I relayed I would provide her with handouts on cholesterol levels, along with dietary considerations to further lower LDL.                    Return to clinic in three months for routine diabetic foot care.  Teaching reinforced on s/s to call wound clinic for promptly.

## 2023-07-10 ENCOUNTER — PATIENT MESSAGE (OUTPATIENT)
Dept: ADMINISTRATIVE | Facility: HOSPITAL | Age: 58
End: 2023-07-10
Payer: COMMERCIAL

## 2023-08-15 ENCOUNTER — PATIENT MESSAGE (OUTPATIENT)
Dept: ADMINISTRATIVE | Facility: HOSPITAL | Age: 58
End: 2023-08-15
Payer: COMMERCIAL

## 2023-08-16 ENCOUNTER — OFFICE VISIT (OUTPATIENT)
Dept: FAMILY MEDICINE | Facility: CLINIC | Age: 58
End: 2023-08-16
Payer: COMMERCIAL

## 2023-08-16 VITALS
DIASTOLIC BLOOD PRESSURE: 75 MMHG | BODY MASS INDEX: 26.81 KG/M2 | SYSTOLIC BLOOD PRESSURE: 117 MMHG | HEART RATE: 85 BPM | TEMPERATURE: 98 F | OXYGEN SATURATION: 100 % | HEIGHT: 67 IN | WEIGHT: 170.81 LBS | RESPIRATION RATE: 18 BRPM

## 2023-08-16 DIAGNOSIS — E11.628 TYPE 2 DIABETES MELLITUS WITH OTHER SKIN COMPLICATION, WITHOUT LONG-TERM CURRENT USE OF INSULIN: Primary | ICD-10-CM

## 2023-08-16 LAB
CREAT UR-MCNC: 244.1 MG/DL (ref 45–106)
HBA1C MFR BLD: 7.5 %
MICROALBUMIN UR-MCNC: 16.1 UG/ML
MICROALBUMIN/CREAT RATIO PNL UR: 6.6 MG/GM CR (ref 0–30)

## 2023-08-16 PROCEDURE — 82043 UR ALBUMIN QUANTITATIVE: CPT | Performed by: STUDENT IN AN ORGANIZED HEALTH CARE EDUCATION/TRAINING PROGRAM

## 2023-08-16 PROCEDURE — 99214 OFFICE O/P EST MOD 30 MIN: CPT | Mod: PBBFAC | Performed by: STUDENT IN AN ORGANIZED HEALTH CARE EDUCATION/TRAINING PROGRAM

## 2023-08-16 PROCEDURE — 83036 HEMOGLOBIN GLYCOSYLATED A1C: CPT | Mod: PBBFAC | Performed by: STUDENT IN AN ORGANIZED HEALTH CARE EDUCATION/TRAINING PROGRAM

## 2023-08-16 NOTE — PROGRESS NOTES
Chief Complaint  Diabetes      History of Present Illness  Maribel Araiza is a 57 y.o. year old female presents to the clinic for follow up on diabetes. Pt has no acute complaints. States she gets her diabetic eye exam done at an outside facility, recently performed in May, 2023. Also sees Dr. Caballero podiatry and [facility] wound care.   Pt reports she stopped taking her metformin 1 month ago and has noticed a decrease in her left leg/foot swelling. She also stopped taking her Losartan 1.5 months ago, and states her bilateral lower back pain has improved since.   Pt continues decline PAP smear as she is currently taking care of her sick . Has been in menopause since September 2022.       Review of Systems   Constitutional:  Negative for weight loss.   Eyes:  Negative for blurred vision.   Cardiovascular:  Negative for chest pain.   Neurological:  Negative for dizziness, tremors, seizures, weakness and headaches.   Endo/Heme/Allergies:  Negative for polydipsia.   Psychiatric/Behavioral:  The patient is not nervous/anxious.          Physical Exam  Vitals and nursing note reviewed.   Constitutional:       General: She is not in acute distress.  Eyes:      Conjunctiva/sclera: Conjunctivae normal.   Cardiovascular:      Rate and Rhythm: Normal rate and regular rhythm.      Pulses:           Dorsalis pedis pulses are 2+ on the right side and 2+ on the left side.      Heart sounds: Normal heart sounds.   Pulmonary:      Effort: Pulmonary effort is normal.      Breath sounds: Normal breath sounds.   Musculoskeletal:      Right lower leg: No edema.      Left lower leg: No edema.      Right foot: Deformity, bunion and Charcot foot present.   Feet:      Right foot:      Protective Sensation: 9 sites tested.  4 sites sensed.      Skin integrity: Skin integrity normal.      Left foot:      Protective Sensation: 9 sites tested.  5 sites sensed.      Skin integrity: Skin integrity normal.      Comments: Right foot 3rd digit  "amputation  Neurological:      General: No focal deficit present.   Psychiatric:         Mood and Affect: Mood normal.     Protective Sensation (w/ 10 gram monofilament):  Right: Decreased  Left: Decreased    Visual Inspection:  Normal -  right foot deformity and 3rd toe amputation. Left foot swollen and with deformity    Pedal Pulses:   Right: Present  Left: Present    Posterior Tibialis Pulses:   Right:Present  Left: Present      Vitals:    08/16/23 0832   BP: 117/75   Pulse: 85   Resp: 18   Temp: 97.9 °F (36.6 °C)     Wt Readings from Last 2 Encounters:   08/16/23 77.5 kg (170 lb 12.8 oz)   05/15/23 78 kg (172 lb)         Current Outpatient Medications  Current Outpatient Medications   Medication Instructions    ammonium lactate 12 % Crea 1 oz, Topical (Top), 2 times daily, apply to affected area    blood sugar diagnostic Strp Check blood glucose in the morning and before each meal.    blood-glucose meter Misc Check blood glucose in the morning and before each meal.    lancets (ACCU-CHEK MULTICLIX LANCET) Misc Check blood glucose in the morning and before each meal.    pen needle, diabetic (BD ULTRA-FINE PAVITHRA PEN NEEDLE) 32 gauge x 5/32" Ndle 1 Bag, Misc.(Non-Drug; Combo Route), 3 times daily             Assessment / Plan:    1. Type 2 diabetes mellitus with other skin complication, without long-term current use of insulin    - Microalbumin/Creatinine Ratio, Urine 6.6, wnl  - POCT HEMOGLOBIN A1C 7.5 today, increased from 6.8  -diabetic foot exam performed today  -up to date on diabetic eye exam        Follow up:    In 3 mo, or earlier if needed. Will need repeat a1c, pap smear if amenable, discussion regarding statin therapy.     Prachi Donald M.D.  [facility] PGY-3      Answers submitted by the patient for this visit:  Diabetes Questionnaire (Submitted on 8/14/2023)  Chief Complaint: Diabetes problem  Diabetes type: type 2  MedicAlert ID: No  Disease duration: 11 Months  fatigue: No  foot paresthesias: No  foot " ulcerations: No  polyphagia: No  polyuria: No  visual change: No  confusion: No  hunger: No  mood changes: No  pallor: No  sleepiness: No  speech difficulty: No  sweats: No  blackouts: No  hospitalization: No  nocturnal hypoglycemia: No  required assistance: No  required glucagon: No  CVA: No  heart disease: No  nephropathy: No  peripheral neuropathy: No  PVD: No  retinopathy: No  autonomic neuropathy: No  CAD risks: diabetes mellitus  Current treatments: diet  Treatment compliance: all of the time  Home blood tests: 3-4 x per day  Home urines: <1 x per month  Monitoring compliance: good  Current diet: generally healthy  Meal planning: avoidance of concentrated sweets, carbohydrate counting  Exercise: daily  Dietitian visit: No  Eye exam current: Yes  Sees podiatrist: Yes

## 2023-08-24 ENCOUNTER — PATIENT OUTREACH (OUTPATIENT)
Dept: ADMINISTRATIVE | Facility: HOSPITAL | Age: 58
End: 2023-08-24
Payer: COMMERCIAL

## 2023-08-24 NOTE — PROGRESS NOTES
Population Health Chart Review & Patient Outreach Details:     Reason for Outreach Encounter:     []  Non-Compliant Report   []  Payor Report (Hum[employer], PHN, BCBS, MSSP, MCIP, UHC, etc.)   [x]  Campaign Message     Updates Requested / Reviewed:     []  Care Everywhere    []     []  External Sources ([facility], [facility], DIS, etc.)   []  Care Team Updated    Patient Outreach Method:    []  Telephone Outreach Completed   [] Successful   [] Left Voicemail   [] Unable to Contact (wrong number, no voicemail)  [x]  [facility] Outreach Sent  []  Letter Outreach Mailed  [x]  Fax Sent for External Records  []  External Records Upload    Health Maintenance Topics Addressed and Outreach Outcomes / Actions Taken:        []      Breast Cancer Screening []  Mammo Scheduled      []  External Records Requested     []  Added Reminder to Complete to Upcoming Primary Care Appt Notes     []  Patient Declined     []  Patient Will Call Back to Schedule     []  Patient Will Schedule with External Provider / Order Routed if Applicable             []       Cervical Cancer Screening []  Pap Scheduled      []  External Records Requested     []  Added Reminder to Complete to Upcoming Primary Care Appt Notes     []  Patient Declined     []  Patient Will Call Back to Schedule     []  Patient Will Schedule with External Provider               []          Colorectal Cancer Screening []  Colonoscopy Case Request or Referral Placed     []  External Records Requested     []  Added Reminder to Complete to Upcoming Primary Care Appt Notes     []  Patient Declined     []  Patient Will Call Back to Schedule     []  Patient Will Schedule with External Provider     []  Fit Kit Mailed (add the SmartPhrase under additional notes)     []  Reminded Patient to Complete Home Test             [x]      Diabetic Eye Exam []  Eye Camera Scheduled or Optometry Referral Placed     [x]  External Records Requested     []  Added Reminder to Complete to  Upcoming Primary Care Appt Notes     []  Patient Declined     []  Patient Will Call Back to Schedule     []  Patient Will Schedule with External Provider             []      Blood Pressure Control []  Primary Care Follow Up Visit Scheduled     []  Remote Blood Pressure Reading Captured     []  Added Reminder to Complete to Upcoming Primary Care Appt Notes     []  Patient Declined     []  Patient Will Call Back / Patient Will Send Portal Message with Reading     []  Patient Will Call Back to Schedule Provider Visit             []       HbA1c & Other Labs []  Lab Appt Scheduled for Due Labs     []  Primary Care Follow Up Visit Scheduled      []  Reminded Patient to Complete Home Test     []  Added Reminder to Complete to Upcoming Primary Care Appt Notes     []  Patient Declined     []  Patient Will Call Back to Schedule     []  Patient Will Schedule with External Provider / Order Routed if Applicable           []    Schedule Primary Care Appt []  Primary Care Appt Scheduled     []  Patient Declined     []  Patient Will Call Back to Schedule     []  Pt Established with External Provider & Updated Care Team             []      Medication Adherence []  Primary Care Appointment Scheduled     []  Added Reminder to Upcoming Primary Care Appt Notes     []  Patient Reminded to  Prescription     []  Patient Declined, Provider Notified if Needed     []  Sent Provider Message to Review and/or Add Exclusion to Problem List             []      Osteoporosis Screening []  DXA Appointment Scheduled     []  External Records Requested     []  Added Reminder to Complete to Upcoming Primary Care Appt Notes     []  Patient Declined     []  Patient Will Call Back to Schedule     []  Patient Will Schedule with External Provider / Order Routed if Applicable     Additional Care Coordinator Notes:     Records request sent to [facility] for DM eye exam.

## 2023-08-24 NOTE — LETTER
AUTHORIZATION FOR RELEASE OF   CONFIDENTIAL INFORMATION    Dear [facility],    We are seeing Maribel Araiza, date of birth 1965, in the clinic at [facility]. Meka aGr MD is the patient's PCP. Maribel Araiza has an outstanding lab/procedure at the time we reviewed her chart. In order to help keep her health information updated, she has authorized us to request the following medical record(s):        (  )  MAMMOGRAM                                      (  )  COLONOSCOPY      (  )  PAP SMEAR                                          (  )  OUTSIDE LAB RESULTS     (  )  DEXA SCAN                                          ( X )  EYE EXAM            (  )  FOOT EXAM                                          (  )  ENTIRE RECORD     (  )  OUTSIDE IMMUNIZATIONS                 (  )  _______________         Please fax records to [facility], [place], Susana, MD, [phone] or [phone].           Patient Name: Maribel Araiza  : 1965  Patient Phone #: [phone]

## 2023-08-29 ENCOUNTER — HOSPITAL ENCOUNTER (OUTPATIENT)
Dept: WOUND CARE | Facility: HOSPITAL | Age: 58
Discharge: HOME | End: 2023-08-29
Attending: NURSE PRACTITIONER
Payer: COMMERCIAL

## 2023-08-29 VITALS
HEART RATE: 77 BPM | OXYGEN SATURATION: 95 % | DIASTOLIC BLOOD PRESSURE: 82 MMHG | TEMPERATURE: 98 F | SYSTOLIC BLOOD PRESSURE: 135 MMHG | RESPIRATION RATE: 18 BRPM

## 2023-08-29 DIAGNOSIS — M21.962 ACQUIRED DEFORMITY OF LEFT FOOT: ICD-10-CM

## 2023-08-29 DIAGNOSIS — M21.961 ACQUIRED DEFORMITY OF RIGHT FOOT: ICD-10-CM

## 2023-08-29 DIAGNOSIS — M20.42 ACQUIRED HAMMERTOES OF BOTH FEET: ICD-10-CM

## 2023-08-29 DIAGNOSIS — L84 CALLUS OF FOOT: ICD-10-CM

## 2023-08-29 DIAGNOSIS — E11.9 ENCOUNTER FOR COMPREHENSIVE DIABETIC FOOT EXAMINATION, TYPE 2 DIABETES MELLITUS: Primary | ICD-10-CM

## 2023-08-29 DIAGNOSIS — Z87.39 HISTORY OF OSTEOMYELITIS: ICD-10-CM

## 2023-08-29 DIAGNOSIS — Z89.421 HISTORY OF AMPUTATION OF LESSER TOE, RIGHT: ICD-10-CM

## 2023-08-29 DIAGNOSIS — M20.41 ACQUIRED HAMMERTOES OF BOTH FEET: ICD-10-CM

## 2023-08-29 DIAGNOSIS — L60.2 OVERGROWN TOENAILS: ICD-10-CM

## 2023-08-29 DIAGNOSIS — B35.1 MYCOTIC TOENAILS: ICD-10-CM

## 2023-08-29 DIAGNOSIS — E11.628 TYPE 2 DIABETES MELLITUS WITH OTHER SKIN COMPLICATION, WITHOUT LONG-TERM CURRENT USE OF INSULIN: ICD-10-CM

## 2023-08-29 DIAGNOSIS — Z91.148 NONCOMPLIANCE WITH MEDICATION REGIMEN: ICD-10-CM

## 2023-08-29 PROCEDURE — 11055 PARING/CUTG B9 HYPRKER LES 1: CPT

## 2023-08-29 PROCEDURE — 11719 TRIM NAIL(S) ANY NUMBER: CPT

## 2023-08-29 PROCEDURE — 99499 NO LOS: ICD-10-PCS | Mod: ,,, | Performed by: NURSE PRACTITIONER

## 2023-08-29 PROCEDURE — 11720 PR DEBRIDEMENT OF NAIL(S), 1-5: ICD-10-PCS | Mod: 59,,, | Performed by: NURSE PRACTITIONER

## 2023-08-29 PROCEDURE — 99499 UNLISTED E&M SERVICE: CPT | Mod: ,,, | Performed by: NURSE PRACTITIONER

## 2023-08-29 PROCEDURE — 11055 PR TRIM HYPERKERATOTIC SKIN LESION, ONE: ICD-10-PCS | Mod: ,,, | Performed by: NURSE PRACTITIONER

## 2023-08-29 PROCEDURE — 11719 PR TRIM NAIL(S): ICD-10-PCS | Mod: ,,, | Performed by: NURSE PRACTITIONER

## 2023-08-29 PROCEDURE — 11055 PARING/CUTG B9 HYPRKER LES 1: CPT | Mod: ,,, | Performed by: NURSE PRACTITIONER

## 2023-08-29 PROCEDURE — 11720 DEBRIDE NAIL 1-5: CPT

## 2023-08-29 PROCEDURE — 11720 DEBRIDE NAIL 1-5: CPT | Mod: 59,,, | Performed by: NURSE PRACTITIONER

## 2023-08-29 PROCEDURE — 11719 TRIM NAIL(S) ANY NUMBER: CPT | Mod: ,,, | Performed by: NURSE PRACTITIONER

## 2023-08-29 NOTE — PROGRESS NOTES
TODAY'S VISIT NOTE WAS IMPORTED FROM LAST WOUND CLINIC VISIT OF 5/22/23.  I ATTEST THAT I REVIEWED THE HPI, ROS, LABS, WOUND-RELATED IMAGING, PHYSICAL EXAMINATION, EVALUATION AND PLAN SECTIONS OF IMPORTED NOTE AND REVISED TODAY'S VISIT NOTE TO REFLECT TODAY'S NEW ASSESSMENT FINDINGS AND TODAY'S UPDATES TO WOUND TREATMENT PLAN.          CHIEF COMPLAINT:    Routine diabetic foot care      HISTORY OF  PRESENT ILLNESS:  57 y.o. White female being seen today for routine diabetic foot care.    Last visit with PCP in Family Medicine Clinic was on 8/28/23 (Dr. Prachi Donald).   Ms. Araiza has discontinued all oral medications, on her own, including anti-diabetic medications.  States side effects of Metformin caused profound weakness; and, Losartan caused significant back pain.  Managing diabetes through diet, per self-report.      Hx of amputated right lesser toe, which was done during [facility] hospitalization 10/2/2022 - 10/7/2022, due to osteomyelitis.  Followed by Dr. Caballero, podiatrist, for foot/toe deformities.  Planned surgical off-loading sometime in summer 2024, following 's hip surgery in April 23.  Has been referred to [facility] for evaluation and treatment of right foot for brace versus AFO.  History includes:        Past Medical History:   Diagnosis Date    Acquired deformity of right foot 10/10/2022    Acquired hammertoes of both feet 10/10/2022    History of osteomyelitis 10/10/2022    Open wound of lesser toe of right foot 10/10/2022    Primary hypertension 10/10/2022    Status post amputation of lesser toe of right foot 10/10/2022    Type 2 diabetes mellitus with skin complication, without long-term current use of insulin 10/10/2022      Past Surgical History:   Procedure Laterality Date    TOE AMPUTATION Right 10/5/2022    Procedure: AMPUTATION, right third toe;  Surgeon: Glen Roth MD;  Location: [facility];  Service: General;  Laterality: Right;      Social History     Socioeconomic History     Marital status:    Tobacco Use    Smoking status: Never     Passive exposure: Never    Smokeless tobacco: Never   Substance and Sexual Activity    Alcohol use: Not Currently    Drug use: Never       Review of Most Recent Wound Care-Related Labs:  Lab Results   Component Value Date/Time    WBC 5.3 10/07/2022 03:11 AM    RBC 4.28 10/07/2022 03:11 AM    HGB 12.9 10/07/2022 03:11 AM    HCT 37.3 10/07/2022 03:11 AM    MCV 87.1 10/07/2022 03:11 AM    MCH 30.1 10/07/2022 03:11 AM    CREATININE 0.64 02/14/2023 09:15 AM    HGBA1C 6.1 02/14/2023 09:15 AM    .00 (H) 10/02/2022 01:27 PM          Today 8/29/23:  As above, has stopped taking Losartan and all anti-diabetic medications, stating she is controlling diabetes through diet.  This morning's CBG was 170.  Normal CBG in morning is in 160 range.  Continues doing callus care to bottom of right foot.  Did see Dr. Caballero for changes in left foot following last appt with me.  He did two xrays and instructed her to stay off foot, which she did.  Says swelling decreased in left foot, when she stopped taking Metformin.  Has increased sensation in feet, as well, since discontinuing Metformin.  States PCP aware of her not taking medications.  No new rashes, lesions, or skin breakdown.  Continues applying nail anti-infective polish to toenails, at least daily, with noted improvement in how toenails are looking.      5/22/23:  Having bony changes in left foot.  Top of foot is much higher than before; and, she feels like her arch is falling.  Denies redness and pain in foot; however, has noticed increased swelling in foot.  Plans to reach out to Dr. Caballero to see if he wants to see her now.  Has not had foot xray done.  No trauma or falls.  Continues doing daily callus care to bottom of right foot.  Checks feet/toes daily.  No new rashes, lesions, or skin breakdown.   Applying toenail suspension twice/day, as prescribed.   States she was able to lower cholesterol  level without medication; and, she does not want to add any new medications.  Stopped Ozempic due to it making her feel badly.  Does not want to take Metformin; however, is still taking that.   Went to air show in [place], and [employer] in [place], this weekend, where she sustained sunburn.      3/20/23:  Takes almost 2 hours for nail polish to dry.  She is applying it as best she can.  Agreeable to applying clear plastic wrap over toenails before wearing socks.  Continues using lambs wool over bony deformities of toes to prevent skin breakdown.  Doing daily callus care to bottom of right foot.  No new rashes, lesions, or skin breakdown.     2/20/23:  Has been soaking feet in vinegar and epsom salts to treat yellow and thickened toenails.  Has never been treated with oral or topical anti-infectives for toenail infection.  Denies numbness, tingling, and pain in feet/toes.  No pain in thighs or calves with laying down or with walking.  Continues doing daily callus care to bottom of right foot.  Unable to wear gel metatarsal off-loading pad, due to sweating/moisture.  Continues using lambs wool over right foot toes to prevent pressure on bony deformities.   Most recent HgbA1C was 6.1%.  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      1/18/23:  Both wounds on right foot are healed.  Continues sanding callus on bottom of right foot, which still has underlying bruise.  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  He is having all kinds of cardiac studies done for surgical clearance.  Continues using lambs wool over hammertoes, as taught.  Continues using gel metatarsal pad to off-load prominent bones in right forefoot.  Denies fevers, chills, wound odor, wound redness, wound pain, or yellow/green drainage.  No new rashes, lesions, or skin breakdown.     1/9/2023:  Bottle of hair conditioner fell  on top of right foot a couple days ago and caused a wound.  Has been applying topically CMPD antibiotics (Vanc 5%, Tobra 4%, and Voricon 2%) since injury occurred.  No purulent drainage, odors, or significant redness around wound.  Has a new bruise over bottom of right foot, where prominent bone in forefoot is located.  Had to jump up and catch her  last week and bruise developed in that area thereafter.  Is using lambs wool over right 2nd hammertoe.  Using gel metatarsal pads to right plantar forefoot; however, did not have that on, when she jumped up to grab .  No reported complications with wound care or current treatment plan.  Has all wound care supplies in home.  Denies fevers, chills, wound odor, wound redness, wound pain, or yellow/green drainage.  No new rashes, lesions, or skin breakdown.  Wanting to return to work next week, if possible.      12/12/2022:  Has been doing callus care to bottom of right foot, as instructed last visit.  Skin is soft where callus used to be.  No reported complications with wound care or current treatment plan.  Has all wound care supplies in home.  Denies fevers, chills, wound odor, wound redness, wound pain, or yellow/green drainage.  No new rashes, lesions, or skin breakdown.   Has been driving  to all of his therapy visits; not sure if she will be able to return to work at beginning of year due to taking care of him.  Says she will go to [facility] and see about getting an off-loading insert for right foot.  Has never heard about lambs wool for protecting toes, but is interested in checking that out.  Did not take this morning's Losartin, stating she takes that when she eats; did not eat yet.     12/1/2022:  Picking up  from [facility] later today; is concerned how increased task of his care will impact wound healing.  Hoping she can return to work at beginning of 2023, if wound heals by then.  Right foot wound has healed quite a bit since last visit;  "she is pleased about that.  Has not been sanding down large callus on bottom of right foot, due to open wound on foot.  No reported complications with wound care or current treatment plan.  Has all wound care supplies in home.  Denies fevers, chills, wound odor, wound redness, wound pain, or yellow/green drainage.  No new rashes, lesions, or skin breakdown.  Will call [facility] to f/u on orthotic evaluation.        REVIEW OF SYSTEMS:  Except as stated in HPI, all other 10 body systems normal.       Current Outpatient Medications   Medication Sig Dispense Refill    ammonium lactate 12 % Crea Apply 1 oz topically 2 (two) times daily. apply to affected area      blood sugar diagnostic Strp Check blood glucose in the morning and before each meal. 100 each 11    blood-glucose meter Misc Check blood glucose in the morning and before each meal. 1 each 1    lancets (ACCU-CHEK MULTICLIX LANCET) Misc Check blood glucose in the morning and before each meal. 100 each 11    pen needle, diabetic (BD ULTRA-FINE PAVITHRA PEN NEEDLE) 32 gauge x 5/32" Ndle 1 Bag by Misc.(Non-Drug; Combo Route) route 3 (three) times daily. 90 each 11     No current facility-administered medications for this encounter.         PHYSICAL EXAMINATION AND VITAL SIGNS:    Vitals:    08/29/23 0838   BP: 135/82   Pulse: 77   Resp: 18   Temp: 98 °F (36.7 °C)       General:   Hypertensive, with diabetes, asymptomatic with; afebrile.  Well-nourished, in no acute distress.    Respiratory: Breathing even, quiet, and unlabored.  No coughing.  Cardiovascular:     Moderate non-pitting edema around left medial/lateral ankles, extending up towards knee area.  Girth of LLE visibly larger than girth of RLE.  No hemosiderin staining or varicosities.   Scattered hair over dorsal toes.  Evidence of shaving legs.   DP pulses palpated.  Toenails mycotic, dystrophic, and overgrown.      Musculoskeletal:   Right 3rd toe amputation surgical scar.  Significant bony deformity " of right foot with widened forefoot, high arch with prominent metatarsal heads, and severe 2nd hammertoe.  Hammertoes to left 2-4th toes; however, not as severe.   Left dorsal foot deformity with prominent bones, and exaggerated arch.  No associated erythema or point tenderness.    Neurologic:  A&O X 3.  Cranial nerves grossly intact.  Abnormal monofilament testing:  complete LOPS in left foot, with significant LOPS in right foot.     Psychiatric:  Calm, cooperative.    Responses appropriate.   Integumentary:      Five overgrown and mycotic toenails.    No odor noted today with debridement.  Thickness of nails is beginning to improve between visits.      Four overgrown mildly thickened toenails.      Callus to right plantar 1st met head:  Stable today.  No paring or sanding needed.      Callus to right plantar 2nd met head:  New callus today.  No underlying skin breakdown following paring today.                        ASSESSMENT AND PLAN:    Non-compliance with medication regimen:  Has stopped taking all anti-diabetic medications and Losartan, on her own, due to side effects.  High risk for toe and foot complications, including amputations, which I did discuss with her today.      Encounter for routine diabetic foot care, without insulin:   Stopped taking Ozempic and Metformin due to s/e.  S/P right 3rd toe amputation @ [facility] on 10/5/2022 due to osteomyelitis.  Diabetes had been well-controlled with Metformin and Ozempic, which she is no longer taking.  Diabetic foot care teaching reinforced, including prompt tx for any skin breakdown in toes/feet, with rationale.     Q7 qualifier with hx of amputated toe    Right foot deformity:  MRI ruled out Charcot foot, per Dr. Caballero, podiatry.  Foot with significant arthritic bony deformities.   Loss of intrinsic mucle ROM bilaterally.   Surgical off-loading by Dr. Caballero on hold for now, due to 's ongoing health issues.      Left foot deformity:  Has been evaluated by  Dr. Caballero, who did two xrays.  He is following her for feet.      Mycotic Toenails:  Improving.  Adherent with twice/day application now.     Prescribed CMPD Voricon 2.67%/Vanc 6.67%, and Cipro 2% nail suspension in DSMO twice/day.  Script being filled by [facility].                                     PROCEDURE NOTE    Procedure Today:  Debridement of 5 toenails and trimming of 4 toenails.  Rationale:  Overgrown and infected toenails, as patient presented today, can lead to diabetic foot/toe ulcers, osteomyelitis, and lower limb amputations.   Informed verbal consent obtained.  Using standard podiatry clippers, Dremmel, and carolina boards, I excised infected nail from 5 toenails and cut/trimmed/filed 4 toenails.   No bleeding or discomfort during procedure.  Patient tolerated procedure without complications.      Callus of right 2nd plantar met head:  Procedure Today: paring and sanding of 1 callus  Rationale: Calluses can lead to wounds, cellulitis, osteomyelitis, and lower limb amputations.     Informed verbal consent obtained.   Using #4 dermal curette, Dremmel, and carolina boards, I pared and sanded 1 callus to healthy soft tissue. No bleeding or discomfort with procedure.     Xerosis of bilateral feet:  Stable.    Prescribed Lac-Hydrin 12%, followed by thin layer of Vaseline twice/day.   CPM    Hammertoes:  Severe deformity of right 2nd toe.    Being followed by Dr. Caballero, podiatrist.                     Return to clinic in three months for routine diabetic foot care.  Teaching reinforced on s/s to call wound clinic for promptly.

## 2023-08-29 NOTE — PROGRESS NOTES
The following record(s)  below were uploaded for Health Maintenance .    DM EYE EXAM   5/1/23

## 2023-09-17 NOTE — PROGRESS NOTES
Faculty Attestation: Patient was seen in [facility] clinic. Patient was seen and examined by the resident.  I have personally reviewed History of the Present Illness , Review of Systems, PFSH , Physical Exam, Assessment and Plan documented by the resident. I was immediately available throughout the encounter. Importance of adherence to treatment recommendations discussed with patient at the time of the visit. Services were furnished in a primary care center in the outpatient department at a [facility] hospital. I discussed the patient with the resident and agree with resident's findings and plan as documented in the resident note. Lisa Quiñonez MD

## 2023-09-19 NOTE — PROGRESS NOTES
Date of Service: 12/14/22  Attending Attestation: Patient discussed with resident. The chart was reviewed thoroughly including pertinent vitals, labs, imaging, medications, prior notes, and consultant/specialist recommendations.  I participated in the management of the patient, reviewed the summary of the plan, and was immediately available at all times throughout the encounter. Services were furnished in a primary care center located in the outpatient department of a [facility] hospital. I agree with the resident's findings and plan as documented in the resident's note.    Janessa Davenport MD  Attending - Family Medicine / Geriatric Medicine  [facility] [place], [facility]

## 2023-09-26 NOTE — PLAN OF CARE
Called [facility] to F/U on referral. Spoke with Milo and she said all they are waiting on is a  to deliver DME to hospital. Nurse Jami notified.       GI Physician: Dr. Tello Sousa for Medication:     Dose:     Quantity:     Pharmacy and Location:

## 2023-12-05 ENCOUNTER — HOSPITAL ENCOUNTER (OUTPATIENT)
Dept: WOUND CARE | Facility: HOSPITAL | Age: 58
Discharge: HOME | End: 2023-12-05
Attending: NURSE PRACTITIONER
Payer: COMMERCIAL

## 2023-12-05 VITALS
OXYGEN SATURATION: 100 % | HEART RATE: 76 BPM | DIASTOLIC BLOOD PRESSURE: 82 MMHG | TEMPERATURE: 98 F | SYSTOLIC BLOOD PRESSURE: 152 MMHG

## 2023-12-05 DIAGNOSIS — M20.42 ACQUIRED HAMMERTOES OF BOTH FEET: ICD-10-CM

## 2023-12-05 DIAGNOSIS — Z87.39 HISTORY OF OSTEOMYELITIS: ICD-10-CM

## 2023-12-05 DIAGNOSIS — L84 CALLUS OF FOOT: ICD-10-CM

## 2023-12-05 DIAGNOSIS — Z91.148 NONCOMPLIANCE WITH MEDICATION REGIMEN: ICD-10-CM

## 2023-12-05 DIAGNOSIS — E11.9 ENCOUNTER FOR COMPREHENSIVE DIABETIC FOOT EXAMINATION, TYPE 2 DIABETES MELLITUS: Primary | ICD-10-CM

## 2023-12-05 DIAGNOSIS — Z89.421 HISTORY OF AMPUTATION OF LESSER TOE, RIGHT: ICD-10-CM

## 2023-12-05 DIAGNOSIS — M21.961 ACQUIRED DEFORMITY OF RIGHT FOOT: ICD-10-CM

## 2023-12-05 DIAGNOSIS — E11.628 TYPE 2 DIABETES MELLITUS WITH OTHER SKIN COMPLICATION, WITHOUT LONG-TERM CURRENT USE OF INSULIN: ICD-10-CM

## 2023-12-05 DIAGNOSIS — B35.1 MYCOTIC TOENAILS: ICD-10-CM

## 2023-12-05 DIAGNOSIS — M20.41 ACQUIRED HAMMERTOES OF BOTH FEET: ICD-10-CM

## 2023-12-05 DIAGNOSIS — M21.962 ACQUIRED DEFORMITY OF LEFT FOOT: ICD-10-CM

## 2023-12-05 DIAGNOSIS — L60.2 OVERGROWN TOENAILS: ICD-10-CM

## 2023-12-05 PROCEDURE — 99499 NO LOS: ICD-10-PCS | Mod: ,,, | Performed by: NURSE PRACTITIONER

## 2023-12-05 PROCEDURE — 11720 DEBRIDE NAIL 1-5: CPT

## 2023-12-05 PROCEDURE — 99211 OFF/OP EST MAY X REQ PHY/QHP: CPT

## 2023-12-05 PROCEDURE — 11719 PR TRIM NAIL(S): ICD-10-PCS | Mod: 51,59,, | Performed by: NURSE PRACTITIONER

## 2023-12-05 PROCEDURE — 11719 TRIM NAIL(S) ANY NUMBER: CPT

## 2023-12-05 PROCEDURE — 11056 PARNG/CUTG B9 HYPRKR LES 2-4: CPT | Mod: ,,, | Performed by: NURSE PRACTITIONER

## 2023-12-05 PROCEDURE — 11720 PR DEBRIDEMENT OF NAIL(S), 1-5: ICD-10-PCS | Mod: 59,,, | Performed by: NURSE PRACTITIONER

## 2023-12-05 PROCEDURE — 11719 TRIM NAIL(S) ANY NUMBER: CPT | Mod: 51,59,, | Performed by: NURSE PRACTITIONER

## 2023-12-05 PROCEDURE — 99499 UNLISTED E&M SERVICE: CPT | Mod: ,,, | Performed by: NURSE PRACTITIONER

## 2023-12-05 PROCEDURE — 11056 PR TRIM BENIGN HYPERKERATOTIC SKIN LESION,2-4: ICD-10-PCS | Mod: ,,, | Performed by: NURSE PRACTITIONER

## 2023-12-05 PROCEDURE — 11721 DEBRIDE NAIL 6 OR MORE: CPT | Mod: 59

## 2023-12-05 PROCEDURE — 27000999 HC MEDICAL RECORD PHOTO DOCUMENTATION

## 2023-12-05 PROCEDURE — 11056 PARNG/CUTG B9 HYPRKR LES 2-4: CPT

## 2023-12-05 PROCEDURE — 11720 DEBRIDE NAIL 1-5: CPT | Mod: 59,,, | Performed by: NURSE PRACTITIONER

## 2023-12-05 NOTE — PROGRESS NOTES
TODAY'S VISIT NOTE WAS IMPORTED FROM LAST WOUND CLINIC VISIT OF 8/29/23.  I ATTEST THAT I REVIEWED THE HPI, ROS, LABS, WOUND-RELATED IMAGING, PHYSICAL EXAMINATION, EVALUATION AND PLAN SECTIONS OF IMPORTED NOTE AND REVISED TODAY'S VISIT NOTE TO REFLECT TODAY'S NEW ASSESSMENT FINDINGS AND TODAY'S UPDATES TO WOUND TREATMENT PLAN.          CHIEF COMPLAINT:    Routine diabetic foot care      HISTORY OF  PRESENT ILLNESS:  58 y.o. White female being seen today for routine diabetic foot care.    [facility] was on 8/16/23 (Dr. Prachi Donald).   Was a no-show for 11/20/23 PCP visit, due to  having new onset seizures.   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  She is his primary CG.  Ms. Araiza has discontinued all oral medications, on her own, including anti-diabetic medications.  States side effects of Metformin caused profound weakness; and, Losartan caused significant back pain.  Managing diabetes through diet, per self-report.      Hx of amputated right lesser toe, which was done during [facility] hospitalization 10/2/2022 - 10/7/2022, due to osteomyelitis.  Followed by Dr. Caballero, podiatrist, for foot/toe deformities.  Planned surgical off-loading sometime in summer 2024, following 's hip surgery in April 23.  Has been referred to [facility] for evaluation and treatment of right foot for brace versus AFO.  History includes:        Past Medical History:   Diagnosis Date    Acquired deformity of right foot 10/10/2022    Acquired hammertoes of both feet 10/10/2022    History of osteomyelitis 10/10/2022    Noncompliance with medication regimen 8/29/2023    Open wound of lesser toe of right foot 10/10/2022    Primary hypertension 10/10/2022    Status post amputation of lesser toe of right foot 10/10/2022    Type 2 diabetes mellitus with skin complication, without long-term current use of insulin 10/10/2022      Past Surgical History:   Procedure Laterality Date     "TOE AMPUTATION Right 10/5/2022    Procedure: AMPUTATION, right third toe;  Surgeon: Glen Roth MD;  Location: [facility];  Service: General;  Laterality: Right;      Social History     Socioeconomic History    Marital status:    Tobacco Use    Smoking status: Never     Passive exposure: Never    Smokeless tobacco: Never   Substance and Sexual Activity    Alcohol use: Not Currently    Drug use: Never       Review of Most Recent Wound Care-Related Labs:  Lab Results   Component Value Date/Time    WBC 5.3 10/07/2022 03:11 AM    RBC 4.28 10/07/2022 03:11 AM    HGB 12.9 10/07/2022 03:11 AM    HCT 37.3 10/07/2022 03:11 AM    MCV 87.1 10/07/2022 03:11 AM    MCH 30.1 10/07/2022 03:11 AM    CREATININE 0.64 02/14/2023 09:15 AM    HGBA1C 6.1 02/14/2023 09:15 AM    .00 (H) 10/02/2022 01:27 PM          Today 12/5/23:  States she is aware of possible complications of not taking any medications prescribed by PCP.  States she is "open to whatever may come my way."  States  would pass shortly after her, if that were to happen, and adult children would place incapacitated adult son in a nursing home for care.  Denies depression or desire to die from diabetic complications.  States, "I just don't want to take pills.  They made me sick."   Checks feet as much as she can since November, when  began having seizures.  Continues doing callus care to bottom of right foot at night before going to bed.  Not checking CBGs.  States she will reschedule appointment with PCP, once  stabilizes.  Left foot continues to swell on daily basis.      8/29/23:  As above, has stopped taking Losartan and all anti-diabetic medications, stating she is controlling diabetes through diet.  This morning's CBG was 170.  Normal CBG in morning is in 160 range.  Continues doing callus care to bottom of right foot.  Did see Dr. Caballero for changes in left foot following last appt with me.  He did two xrays and instructed her to " "stay off foot, which she did.  Says swelling decreased in left foot, when she stopped taking Metformin.  Has increased sensation in feet, as well, since discontinuing Metformin.  States PCP aware of her not taking medications.  No new rashes, lesions, or skin breakdown.  Continues applying nail anti-infective polish to toenails, at least daily, with noted improvement in how toenails are looking.      5/22/23:  Having bony changes in left foot.  Top of foot is much higher than before; and, she feels like her arch is falling.  Denies redness and pain in foot; however, has noticed increased swelling in foot.  Plans to reach out to Dr. Caballero to see if he wants to see her now.  Has not had foot xray done.  No trauma or falls.  Continues doing daily callus care to bottom of right foot.  Checks feet/toes daily.  No new rashes, lesions, or skin breakdown.   Applying toenail suspension twice/day, as prescribed.   States she was able to lower cholesterol level without medication; and, she does not want to add any new medications.  Stopped Ozempic due to it making her feel badly.  Does not want to take Metformin; however, is still taking that.   Went to air show in [place], and [employer] in [place], this weekend, where she sustained sunburn.          REVIEW OF SYSTEMS:  Except as stated in HPI, all other 10 body systems normal.       Current Outpatient Medications   Medication Sig Dispense Refill    ammonium lactate 12 % Crea Apply 1 oz topically 2 (two) times daily. apply to affected area      blood sugar diagnostic Strp Check blood glucose in the morning and before each meal. 100 each 11    blood-glucose meter Misc Check blood glucose in the morning and before each meal. 1 each 1    lancets (ACCU-CHEK MULTICLIX LANCET) Misc Check blood glucose in the morning and before each meal. 100 each 11    pen needle, diabetic (BD ULTRA-FINE PAVITHRA PEN NEEDLE) 32 gauge x 5/32" Ndle 1 Bag by Misc.(Non-Drug; Combo Route) " route 3 (three) times daily. 90 each 11     No current facility-administered medications for this encounter.         PHYSICAL EXAMINATION AND VITAL SIGNS:    Vitals:    12/05/23 0850   BP: (!) 152/82   Pulse: 76   Temp: 97.8 °F (36.6 °C)         General:   Hypertensive, with diabetes, asymptomatic with; afebrile.  Well-nourished, in no acute distress.    Respiratory: Breathing even, quiet, and unlabored at rest.  No coughing.  Cardiovascular:     Moderate non-pitting edema of left foot, extending proximally.  No hemosiderin staining or varicosities.   Scattered hair over dorsal toes.  Evidence of shaving legs.   DP pulses palpated.  Toenails mycotic, dystrophic, and overgrown.      Musculoskeletal:   Right 3rd toe amputation surgical scar.  Significant bony deformity of right foot with widened forefoot, high arch with prominent metatarsal heads, and severe 2nd hammertoe.  Hammertoes to left 2-4th toes; however, not as severe.   Left dorsal foot deformity with prominent bones, and exaggerated arch.  No associated erythema or point tenderness.    Neurologic:  A&O X 3.  Cranial nerves grossly intact.  Abnormal monofilament testing:  complete LOPS in both feet.     Psychiatric:  Calm, cooperative.    Responses appropriate.   Integumentary:      Five overgrown and mycotic toenails.    No odor noted today with debridement.  Significant recurrence of thickness over toenails on right foot.        Four overgrown mildly thickened toenails on right foot.      Callus to right plantar 1st met head:  Moderate recurrence of callus.  Underlying skin intact following today's paring.      Callus to right plantar 2nd met head:  Moderate recurrence of callus.  No underlying skin breakdown following paring today.                              ASSESSMENT AND PLAN:    Non-compliance with medication regimen:  Has stopped taking all anti-diabetic medications and Losartan, on her own, due to side effects.  Was a no-show for PCP appointment on  11/20/23.  High risk for toe and foot complications, including amputations, which I did discuss with her today.  Ms. Araiza voices she is overwhelmed with , mother, and son's health issues.  States she is taking care of herself to the best of her ability.  Denies depression, SI, and HI.      Encounter for routine diabetic foot care, without insulin:   Stopped taking Ozempic and Metformin due to s/e.  S/P right 3rd toe amputation @ [facility] on 10/5/2022 due to osteomyelitis.  Diabetes had been well-controlled with Metformin and Ozempic, which she is no longer taking.  Diabetic foot care teaching reinforced, including prompt tx for any skin breakdown in toes/feet, with rationale.     Q7 qualifier with hx of amputated toe    Right foot deformity:  MRI ruled out Charcot foot, per Dr. Caballero, podiatry.  Foot with significant arthritic bony deformities.   Loss of intrinsic mucle ROM bilaterally.   Surgical off-loading by Dr. Caballero on hold for now, due to 's ongoing health issues.      Left foot deformity with chronic edema:  Has been evaluated by Dr. Caballero, who did two xrays.  He is following her for feet.      Mycotic Toenails:  Significant recurrence of mycotic nails of right foot.  Non-adherent with topical medications, per self-report.   Prescribed CMPD Voricon 2.67%/Vanc 6.67%, and Cipro 2% nail suspension in DSMO twice/day.  Script being filled by [facility].                                     PROCEDURE NOTE    Procedure Today:  Debridement of 5 toenails and trimming of 4 toenails.  Rationale:  Overgrown and infected toenails, as patient presented today, can lead to diabetic foot/toe ulcers, osteomyelitis, and lower limb amputations.   Informed verbal consent obtained.  Using standard podiatry clippers, Dremmel, and Campbell boards, I excised infected nail from 5 toenails and cut/trimmed/filed 4 toenails.   No bleeding or discomfort during procedure.  Patient tolerated procedure without  complications.      Callus of right 2nd and 3rd plantar met heads:  Procedure Today: paring and sanding of 2 calluses  Rationale: Calluses can lead to wounds, cellulitis, osteomyelitis, and lower limb amputations.     Informed verbal consent obtained.   Using #4 dermal curette, Dremmel, and carolina boards, I pared and sanded 1 callus to healthy soft tissue. No bleeding or discomfort with procedure.     Xerosis of bilateral feet:  Stable.    Prescribed Lac-Hydrin 12%, followed by thin layer of Vaseline twice/day.   CPM    Hammertoes:  Severe deformity of right 2nd toe.    Being followed by Dr. Caballero, podiatrist.                     Return to clinic in three months for routine diabetic foot care.  Teaching reinforced on s/s to call wound clinic for promptly.

## 2024-03-12 ENCOUNTER — HOSPITAL ENCOUNTER (OUTPATIENT)
Dept: WOUND CARE | Facility: HOSPITAL | Age: 59
Discharge: HOME | End: 2024-03-12
Attending: NURSE PRACTITIONER

## 2024-03-12 VITALS
HEART RATE: 67 BPM | RESPIRATION RATE: 20 BRPM | DIASTOLIC BLOOD PRESSURE: 82 MMHG | TEMPERATURE: 98 F | OXYGEN SATURATION: 99 % | SYSTOLIC BLOOD PRESSURE: 148 MMHG

## 2024-03-12 DIAGNOSIS — M20.42 ACQUIRED HAMMERTOES OF BOTH FEET: ICD-10-CM

## 2024-03-12 DIAGNOSIS — M21.962 ACQUIRED DEFORMITY OF LEFT FOOT: ICD-10-CM

## 2024-03-12 DIAGNOSIS — E11.628 TYPE 2 DIABETES MELLITUS WITH OTHER SKIN COMPLICATION, WITHOUT LONG-TERM CURRENT USE OF INSULIN: ICD-10-CM

## 2024-03-12 DIAGNOSIS — M20.41 ACQUIRED HAMMERTOES OF BOTH FEET: ICD-10-CM

## 2024-03-12 DIAGNOSIS — E11.9 ENCOUNTER FOR COMPREHENSIVE DIABETIC FOOT EXAMINATION, TYPE 2 DIABETES MELLITUS: Primary | ICD-10-CM

## 2024-03-12 DIAGNOSIS — M21.961 ACQUIRED DEFORMITY OF RIGHT FOOT: ICD-10-CM

## 2024-03-12 DIAGNOSIS — Z87.39 HISTORY OF OSTEOMYELITIS: ICD-10-CM

## 2024-03-12 DIAGNOSIS — Z89.421 HISTORY OF AMPUTATION OF LESSER TOE, RIGHT: ICD-10-CM

## 2024-03-12 DIAGNOSIS — L84 CALLUS OF FOOT: ICD-10-CM

## 2024-03-12 DIAGNOSIS — L60.2 OVERGROWN TOENAILS: ICD-10-CM

## 2024-03-12 DIAGNOSIS — B35.1 MYCOTIC TOENAILS: ICD-10-CM

## 2024-03-12 DIAGNOSIS — Z91.148 NONCOMPLIANCE WITH MEDICATION REGIMEN: ICD-10-CM

## 2024-03-12 PROCEDURE — 99499 UNLISTED E&M SERVICE: CPT | Mod: ,,, | Performed by: NURSE PRACTITIONER

## 2024-03-12 PROCEDURE — 99211 OFF/OP EST MAY X REQ PHY/QHP: CPT

## 2024-03-12 PROCEDURE — 11055 PARING/CUTG B9 HYPRKER LES 1: CPT

## 2024-03-12 PROCEDURE — 11719 TRIM NAIL(S) ANY NUMBER: CPT | Mod: 51,,, | Performed by: NURSE PRACTITIONER

## 2024-03-12 PROCEDURE — 11719 TRIM NAIL(S) ANY NUMBER: CPT

## 2024-03-12 PROCEDURE — 27000999 HC MEDICAL RECORD PHOTO DOCUMENTATION

## 2024-03-12 PROCEDURE — 11056 PARNG/CUTG B9 HYPRKR LES 2-4: CPT | Mod: ,,, | Performed by: NURSE PRACTITIONER

## 2024-03-12 NOTE — PROGRESS NOTES
TODAY'S VISIT NOTE WAS IMPORTED FROM LAST WOUND CLINIC VISIT OF 12/5/23.  I ATTEST THAT I REVIEWED THE HPI, ROS, LABS, WOUND-RELATED IMAGING, PHYSICAL EXAMINATION, EVALUATION AND PLAN SECTIONS OF IMPORTED NOTE AND REVISED TODAY'S VISIT NOTE TO REFLECT TODAY'S NEW ASSESSMENT FINDINGS AND TODAY'S UPDATES TO WOUND TREATMENT PLAN.          CHIEF COMPLAINT:    Routine diabetic foot care      HISTORY OF  PRESENT ILLNESS:  58 y.o. White female being seen today for routine diabetic foot care.    Last visit with [facility]CP in [facility] was on 8/16/23 (Dr. Prachi Donald).   Was a no-show for 11/20/23 PCP visit, due to  having new onset seizures.   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  She is his primary CG.  Ms. Araiza has discontinued all oral medications, on her own, including anti-diabetic medications.  States side effects of Metformin caused profound weakness; and, Losartan caused significant back pain.  Managing diabetes through diet, per self-report.  Has not checked CBG since November 2023, per today's (3/12/24) self-report.      Hx of amputated right lesser toe, which was done during [facility] hospitalization 10/2/2022 - 10/7/2022, due to osteomyelitis.  Followed by Dr. Caballero, podiatrist, for foot/toe deformities.  Planned surgical off-loading sometime in summer 2024, following 's hip surgery in April 23.  Has been referred to [facility] for evaluation and treatment of right foot for brace versus AFO.  History includes:        Past Medical History:   Diagnosis Date    Acquired deformity of right foot 10/10/2022    Acquired hammertoes of both feet 10/10/2022    History of osteomyelitis 10/10/2022    Noncompliance with medication regimen 8/29/2023    Open wound of lesser toe of right foot 10/10/2022    Primary hypertension 10/10/2022    Status post amputation of lesser toe of right foot 10/10/2022    Type 2 diabetes mellitus with skin complication, without long-term current use of  "insulin 10/10/2022      Past Surgical History:   Procedure Laterality Date    TOE AMPUTATION Right 10/5/2022    Procedure: AMPUTATION, right third toe;  Surgeon: Glen Roth MD;  Location: [facility];  Service: General;  Laterality: Right;      Social History     Socioeconomic History    Marital status:    Tobacco Use    Smoking status: Never     Passive exposure: Never    Smokeless tobacco: Never   Substance and Sexual Activity    Alcohol use: Not Currently    Drug use: Never       Review of Most Recent Wound Care-Related Labs:  Lab Results   Component Value Date/Time    WBC 5.3 10/07/2022 03:11 AM    RBC 4.28 10/07/2022 03:11 AM    HGB 12.9 10/07/2022 03:11 AM    HCT 37.3 10/07/2022 03:11 AM    MCV 87.1 10/07/2022 03:11 AM    MCH 30.1 10/07/2022 03:11 AM    CREATININE 0.64 02/14/2023 09:15 AM    HGBA1C 6.1 02/14/2023 09:15 AM    .00 (H) 10/02/2022 01:27 PM          Today 3/12/24:  As above, not checking CBGs or taking any medications.  Continues sanding down calluses on bottom of right foot, as instructed, to relieve underlying pressure on skin.  Missed last visit with Dr. Caballero; has not rescheduled that appointment.  Having some insurance coverage issues and is getting bombarded with phone calls every day, which is distracting from self-care.  Denies numbness and tingling in feet and toes.  No pain in calves or thighs with walking or laying down.  Needs a refill on toenail polish to treat nail infection.      12/5/23:  States she is aware of possible complications of not taking any medications prescribed by PCP.  States she is "open to whatever may come my way."  States  would pass shortly after her, if that were to happen, and adult children would place incapacitated adult son in a nursing home for care.  Denies depression or desire to die from diabetic complications.  States, "I just don't want to take pills.  They made me sick."   Checks feet as much as she can since November, when "  began having seizures.  Continues doing callus care to bottom of right foot at night before going to bed.  Not checking CBGs.  States she will reschedule appointment with PCP, once  stabilizes.  Left foot continues to swell on daily basis.      8/29/23:  As above, has stopped taking Losartan and all anti-diabetic medications, stating she is controlling diabetes through diet.  This morning's CBG was 170.  Normal CBG in morning is in 160 range.  Continues doing callus care to bottom of right foot.  Did see Dr. Caballero for changes in left foot following last appt with me.  He did two xrays and instructed her to stay off foot, which she did.  Says swelling decreased in left foot, when she stopped taking Metformin.  Has increased sensation in feet, as well, since discontinuing Metformin.  States PCP aware of her not taking medications.  No new rashes, lesions, or skin breakdown.  Continues applying nail anti-infective polish to toenails, at least daily, with noted improvement in how toenails are looking.      5/22/23:  Having bony changes in left foot.  Top of foot is much higher than before; and, she feels like her arch is falling.  Denies redness and pain in foot; however, has noticed increased swelling in foot.  Plans to reach out to Dr. Caballero to see if he wants to see her now.  Has not had foot xray done.  No trauma or falls.  Continues doing daily callus care to bottom of right foot.  Checks feet/toes daily.  No new rashes, lesions, or skin breakdown.   Applying toenail suspension twice/day, as prescribed.   States she was able to lower cholesterol level without medication; and, she does not want to add any new medications.  Stopped Ozempic due to it making her feel badly.  Does not want to take Metformin; however, is still taking that.   Went to air show in [place], and [employer] [place] in [place], this weekend, where she sustained sunburn.          REVIEW OF SYSTEMS:  Except as stated in  "HPI, all other 10 body systems normal.       Current Outpatient Medications   Medication Sig Dispense Refill    ammonium lactate 12 % Crea Apply 1 oz topically 2 (two) times daily. apply to affected area      blood sugar diagnostic Strp Check blood glucose in the morning and before each meal. (Patient not taking: Reported on 3/12/2024) 100 each 11    blood-glucose meter Misc Check blood glucose in the morning and before each meal. (Patient not taking: Reported on 3/12/2024) 1 each 1    lancets (ACCU-CHEK MULTICLIX LANCET) Misc Check blood glucose in the morning and before each meal. (Patient not taking: Reported on 3/12/2024) 100 each 11    pen needle, diabetic (BD ULTRA-FINE PAVITHRA PEN NEEDLE) 32 gauge x 5/32" Ndle 1 Bag by Misc.(Non-Drug; Combo Route) route 3 (three) times daily. 90 each 11     No current facility-administered medications for this encounter.         PHYSICAL EXAMINATION AND VITAL SIGNS:    Vitals:    03/12/24 0804   BP: (!) 148/82   Pulse: 67   Resp: 20   Temp: 97.9 °F (36.6 °C)           General:   Hypertensive, with diabetes, asymptomatic with; afebrile.  Well-nourished, in no acute distress.    Respiratory: Breathing even, quiet, and unlabored at rest.  No coughing.  Cardiovascular:     Moderate non-pitting edema of left foot, extending proximally.  No hemosiderin staining or varicosities.   Scattered hair over dorsal toes.  Evidence of shaving legs.   DP pulses palpated.  Toenails mycotic, dystrophic, and overgrown.      Musculoskeletal:   Right 3rd toe amputation surgical scar.  Significant bony deformity of right foot with widened forefoot, high arch with prominent metatarsal heads, and severe 2nd hammertoe.  Hammertoes to left 2-4th toes; however, not as severe.   Left dorsal foot deformity with prominent bones, and exaggerated arch.  No associated erythema or point tenderness.    Neurologic:  A&O X 3.  Cranial nerves grossly intact.  Abnormal monofilament testing:  complete LOPS in both " feet.     Psychiatric:  Calm, cooperative.    Responses appropriate.   Integumentary:      Five overgrown and mycotic toenails.    No odor noted today with debridement.  Significant recurrence of thickness over toenails on right foot.        Four overgrown mildly thickened toenails on right foot.      Callus to right plantar 1st met head:  Moderate recurrence of callus.  Underlying skin intact following today's paring.      Callus to right plantar 2nd met head:  Moderate recurrence of callus.  No underlying skin breakdown following paring today.                                  ASSESSMENT AND PLAN:    Non-compliance with medication regimen:  Has stopped taking all anti-diabetic medications and Losartan, on her own, due to side effects.  Was a no-show for PCP appointment on 11/20/23.  Did not make it to Dr. Caballero's visit (podiatry).  High risk for toe and foot complications, including amputations, which I did discuss with her again today.  Ms. Araiza voices she is overwhelmed with , mother, and son's health issues.  States she is taking care of herself to the best of her ability.  Denies depression, SI, and HI.      Encounter for routine diabetic foot care, without insulin:   Stopped taking Ozempic and Metformin due to s/e.  S/P right 3rd toe amputation @ [facility] on 10/5/2022 due to osteomyelitis.  Diabetes had been well-controlled with Metformin and Ozempic, which she is no longer taking.  Diabetic foot care teaching reinforced, including prompt tx for any skin breakdown in toes/feet, with rationale.     Last visit with PCP in [facility] was on:  8/16/23 (Dr. Prachi Donald)  Q7 qualifier with hx of amputated toe    Right foot deformity:  MRI ruled out Charcot foot, per Dr. Caballero, podiatry.  Foot with significant arthritic bony deformities.   Loss of intrinsic mucle ROM bilaterally.   Surgical off-loading by Dr. Caballero on hold for now, due to 's ongoing health issues.      Left foot deformity with chronic  edema:  Has been evaluated by Dr. Caballero, who did two xrays.  He had been following her for feet.      Mycotic Toenails:  Significant recurrence of mycotic nails of right foot.  Prescribed CMPD Voricon 2.67%/Vanc 6.67%, and Cipro 2% nail suspension in DSMO twice/day.  Script being filled by [facility].                                                 PROCEDURE NOTE    Procedure Today:   cutting and filing of of 9 toenails.  Rationale:  Overgrown and infected toenails, as patient presented today, can lead to diabetic foot/toe ulcers, osteomyelitis, and lower limb amputations.   Informed verbal consent obtained.  Using standard podiatry clippers, Dremmel, and carolina boards, I cut and filed 9 toenails.  No bleeding or discomfort during procedure.  Patient tolerated procedure without complications.      Callus of right 2nd and 3rd plantar met heads:  Procedure Today: paring and sanding of 2 calluses  Rationale: Calluses can lead to wounds, cellulitis, osteomyelitis, and lower limb amputations.     Informed verbal consent obtained.   Using #4 dermal curette, Dremmel, and carolina boards, I pared and sanded 2 calluses to healthy soft tissue. No bleeding or discomfort with procedure.     Xerosis of bilateral feet:  Stable.    Prescribed Lac-Hydrin 12%, followed by thin layer of Vaseline twice/day.   CPM    Hammertoes:  Severe deformity of right 2nd toe.    Previously followed by Dr. Caballero, podiatrist.                     Return to clinic in three months for routine diabetic foot care.  Teaching reinforced on s/s to call wound clinic for promptly.

## 2024-06-14 ENCOUNTER — HOSPITAL ENCOUNTER (OUTPATIENT)
Dept: WOUND CARE | Facility: HOSPITAL | Age: 59
Discharge: HOME | End: 2024-06-14
Attending: NURSE PRACTITIONER

## 2024-06-14 VITALS
TEMPERATURE: 98 F | DIASTOLIC BLOOD PRESSURE: 95 MMHG | HEART RATE: 82 BPM | OXYGEN SATURATION: 99 % | SYSTOLIC BLOOD PRESSURE: 155 MMHG

## 2024-06-14 DIAGNOSIS — L60.2 OVERGROWN TOENAILS: Primary | ICD-10-CM

## 2024-06-14 DIAGNOSIS — M21.962 ACQUIRED DEFORMITY OF LEFT FOOT: ICD-10-CM

## 2024-06-14 DIAGNOSIS — E11.628 TYPE 2 DIABETES MELLITUS WITH OTHER SKIN COMPLICATION, WITHOUT LONG-TERM CURRENT USE OF INSULIN: ICD-10-CM

## 2024-06-14 DIAGNOSIS — Z91.148 NONCOMPLIANCE WITH MEDICATION REGIMEN: ICD-10-CM

## 2024-06-14 DIAGNOSIS — B35.1 MYCOTIC TOENAILS: ICD-10-CM

## 2024-06-14 DIAGNOSIS — M21.961 ACQUIRED DEFORMITY OF RIGHT FOOT: ICD-10-CM

## 2024-06-14 DIAGNOSIS — L84 CALLUS OF FOOT: ICD-10-CM

## 2024-06-14 DIAGNOSIS — E11.9 ENCOUNTER FOR COMPREHENSIVE DIABETIC FOOT EXAMINATION, TYPE 2 DIABETES MELLITUS: ICD-10-CM

## 2024-06-14 DIAGNOSIS — Z89.421 HISTORY OF AMPUTATION OF LESSER TOE, RIGHT: ICD-10-CM

## 2024-06-14 PROCEDURE — 11056 PARNG/CUTG B9 HYPRKR LES 2-4: CPT | Mod: ,,, | Performed by: NURSE PRACTITIONER

## 2024-06-14 PROCEDURE — 11720 DEBRIDE NAIL 1-5: CPT | Mod: 59,,, | Performed by: NURSE PRACTITIONER

## 2024-06-14 PROCEDURE — 99213 OFFICE O/P EST LOW 20 MIN: CPT | Mod: 25,,, | Performed by: NURSE PRACTITIONER

## 2024-06-14 PROCEDURE — 99211 OFF/OP EST MAY X REQ PHY/QHP: CPT | Mod: 25

## 2024-06-14 PROCEDURE — 11720 DEBRIDE NAIL 1-5: CPT

## 2024-06-14 PROCEDURE — 11056 PARNG/CUTG B9 HYPRKR LES 2-4: CPT

## 2024-06-14 PROCEDURE — 11719 TRIM NAIL(S) ANY NUMBER: CPT

## 2024-06-14 PROCEDURE — 27000999 HC MEDICAL RECORD PHOTO DOCUMENTATION

## 2024-06-14 RX ORDER — AMMONIUM LACTATE 12 G/100G
1 CREAM TOPICAL 2 TIMES DAILY
Qty: 385 G | Refills: 11 | Status: SHIPPED | OUTPATIENT
Start: 2024-06-14

## 2024-06-17 NOTE — PROGRESS NOTES
TODAY'S VISIT NOTE WAS IMPORTED FROM LAST WOUND CLINIC VISIT OF 3/12/24.  I ATTEST THAT I REVIEWED THE HPI, ROS, LABS, WOUND-RELATED IMAGING, PHYSICAL EXAMINATION, EVALUATION AND PLAN SECTIONS OF IMPORTED NOTE AND REVISED TODAY'S VISIT NOTE TO REFLECT TODAY'S NEW ASSESSMENT FINDINGS AND TODAY'S UPDATES TO WOUND TREATMENT PLAN.          CHIEF COMPLAINT:    Routine diabetic foot care      HISTORY OF  PRESENT ILLNESS:  58 y.o. White female being seen today for routine diabetic foot care.    Last visit with [facility]CP [facility] was on 8/16/23 (Dr. Prachi Donald).   Was a no-show for 11/20/23 PCP visit, due to  having new onset seizures.   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  She is his primary CG.  Ms. Araiza has discontinued all oral medications, on her own, including anti-diabetic medications.  States side effects of Metformin caused profound weakness; and, Losartan caused significant back pain.  Managing diabetes through diet, per self-report.        Hx of amputated right lesser toe, which was done during [facility] hospitalization 10/2/2022 - 10/7/2022, due to osteomyelitis.  Followed by Dr. Caballero, podiatrist, for foot/toe deformities.  Planned surgical off-loading sometime in summer 2024, following 's hip surgery in April 23.  Has been referred to [facility] for evaluation and treatment of right foot for brace versus AFO.  History includes:        Past Medical History:   Diagnosis Date    Acquired deformity of right foot 10/10/2022    Acquired hammertoes of both feet 10/10/2022    History of osteomyelitis 10/10/2022    Noncompliance with medication regimen 8/29/2023    Open wound of lesser toe of right foot 10/10/2022    Primary hypertension 10/10/2022    Status post amputation of lesser toe of right foot 10/10/2022    Type 2 diabetes mellitus with skin complication, without long-term current use of insulin 10/10/2022      Past Surgical History:   Procedure Laterality Date  "   TOE AMPUTATION Right 10/5/2022    Procedure: AMPUTATION, right third toe;  Surgeon: Glen Roth MD;  Location: [facility];  Service: General;  Laterality: Right;      Social History     Socioeconomic History    Marital status:    Tobacco Use    Smoking status: Never     Passive exposure: Never    Smokeless tobacco: Never   Substance and Sexual Activity    Alcohol use: Not Currently    Drug use: Never       Review of Most Recent Wound Care-Related Labs:  Lab Results   Component Value Date/Time    WBC 5.3 10/07/2022 03:11 AM    RBC 4.28 10/07/2022 03:11 AM    HGB 12.9 10/07/2022 03:11 AM    HCT 37.3 10/07/2022 03:11 AM    MCV 87.1 10/07/2022 03:11 AM    MCH 30.1 10/07/2022 03:11 AM    CREATININE 0.64 02/14/2023 09:15 AM    HGBA1C 6.1 02/14/2023 09:15 AM    .00 (H) 10/02/2022 01:27 PM          Today 6/14/24:  Checking CBGs sporadically, after eating.  CBGs consistently <180, per self-report, two hours after eating.  Still not taking any blood pressure or diabetes medications.  States "I have to do what I have to do."   's health continues to absorb much of her time.  He fell since last visit; and, he continues having seizures.  Ms. Araiza has not rescheduled appointment with Dr. Caballero, podiatrist, to inquire about surgical off-loading of right foot.  States that is going to have to wait.  Has been sanding down calluses on bottom of right foot, as best she can.  It has gotten very thick over past couple of weeks though.  Not aware of any underlying skin breakdown.  No drainage, odors, or redness around calluses.  Has a callus on top of right 4th toe, which she has been trying to sand down with Boat[employer] boards.   Applying toenail antibiotic polish very infrequently.   Applying Lac-Hydrin to feet sporadically, as well.      3/12/24:  As above, not checking CBGs or taking any medications.  Continues sanding down calluses on bottom of right foot, as instructed, to relieve underlying pressure on " "skin.  Missed last visit with Dr. Caballero; has not rescheduled that appointment.  Having some insurance coverage issues and is getting bombarded with phone calls every day, which is distracting from self-care.  Denies numbness and tingling in feet and toes.  No pain in calves or thighs with walking or laying down.  Needs a refill on toenail polish to treat nail infection.      12/5/23:  States she is aware of possible complications of not taking any medications prescribed by PCP.  States she is "open to whatever may come my way."  States  would pass shortly after her, if that were to happen, and adult children would place incapacitated adult son in a nursing home for care.  Denies depression or desire to die from diabetic complications.  States, "I just don't want to take pills.  They made me sick."   Checks feet as much as she can since November, when  began having seizures.  Continues doing callus care to bottom of right foot at night before going to bed.  Not checking CBGs.  States she will reschedule appointment with PCP, once  stabilizes.  Left foot continues to swell on daily basis.      8/29/23:  As above, has stopped taking Losartan and all anti-diabetic medications, stating she is controlling diabetes through diet.  This morning's CBG was 170.  Normal CBG in morning is in 160 range.  Continues doing callus care to bottom of right foot.  Did see Dr. Caballero for changes in left foot following last appt with me.  He did two xrays and instructed her to stay off foot, which she did.  Says swelling decreased in left foot, when she stopped taking Metformin.  Has increased sensation in feet, as well, since discontinuing Metformin.  States PCP aware of her not taking medications.  No new rashes, lesions, or skin breakdown.  Continues applying nail anti-infective polish to toenails, at least daily, with noted improvement in how toenails are looking.      5/22/23:  Having bony changes in left " "foot.  Top of foot is much higher than before; and, she feels like her arch is falling.  Denies redness and pain in foot; however, has noticed increased swelling in foot.  Plans to reach out to Dr. Caballero to see if he wants to see her now.  Has not had foot xray done.  No trauma or falls.  Continues doing daily callus care to bottom of right foot.  Checks feet/toes daily.  No new rashes, lesions, or skin breakdown.   Applying toenail suspension twice/day, as prescribed.   States she was able to lower cholesterol level without medication; and, she does not want to add any new medications.  Stopped Ozempic due to it making her feel badly.  Does not want to take Metformin; however, is still taking that.   Went to air show in [place], and [employer] in [place], this weekend, where she sustained sunburn.          REVIEW OF SYSTEMS:  Except as stated in HPI, all other 10 body systems normal.       Current Outpatient Medications   Medication Sig Dispense Refill    ammonium lactate 12 % Crea Apply 1 oz topically 2 (two) times daily. apply to affected area 385 g 11    blood sugar diagnostic Strp Check blood glucose in the morning and before each meal. (Patient not taking: Reported on 3/12/2024) 100 each 11    blood-glucose meter Misc Check blood glucose in the morning and before each meal. (Patient not taking: Reported on 3/12/2024) 1 each 1    lancets (ACCU-CHEK MULTICLIX LANCET) Misc Check blood glucose in the morning and before each meal. (Patient not taking: Reported on 3/12/2024) 100 each 11    pen needle, diabetic (BD ULTRA-FINE PAVITHRA PEN NEEDLE) 32 gauge x 5/32" Ndle 1 Bag by Misc.(Non-Drug; Combo Route) route 3 (three) times daily. 90 each 11     No current facility-administered medications for this encounter.         PHYSICAL EXAMINATION AND VITAL SIGNS:    Vitals:    06/14/24 0843   BP: (!) 155/95   Pulse: 82   Temp: 98.1 °F (36.7 °C)           General:   Hypertensive, with diabetes, asymptomatic " with; afebrile.  Well-nourished, in no acute distress.    Respiratory: Breathing even, quiet, and unlabored at rest.  No coughing.  Cardiovascular:     Moderate non-pitting edema of right foot, extending proximally.  No hemosiderin staining or varicosities.   Scattered hair over dorsal toes.  Evidence of shaving legs.   DP pulses palpated.  Toenails mycotic, dystrophic, and overgrown.      Musculoskeletal:   Right 3rd toe amputation surgical scar.  Significant bony deformity of right foot with widened forefoot, high arch with prominent metatarsal heads, and severe 2nd hammertoe.  Hammertoes to left 2-4th toes; however, not as severe.   Left dorsal foot deformity with prominent bones, and exaggerated arch.  No associated erythema or point tenderness.    Neurologic:  A&O X 3.  Cranial nerves grossly intact.  Abnormal monofilament testing:  complete LOPS in both feet.     Psychiatric:  Calm, cooperative.    Responses appropriate.   Integumentary:      Four overgrown and mycotic toenails on right foot:    No odor noted today with debridement.  Significant recurrence of thickness over toenails on right foot.        Five overgrown mildly thickened toenails on left foot.      Callus to right plantar 1st met head:  Severe recurrence of callus.  Underlying skin intact; however, showing symptoms of pre-diabetic ulcer skin color changes.     Callus to right plantar 2nd met head:  Moderate recurrence of callus.  No underlying skin breakdown following paring today; however, showing symptoms of pre-diabetic ulcer skin color changes.      Callus to right dorsal 4th toe:  Moderately thick callus.  No underlying skin color changes with paring today.                                     ASSESSMENT AND PLAN:    Non-compliance with medication regimen:  Has stopped taking all anti-diabetic medications and Losartan, on her own, due to side effects.  Was a no-show for PCP appointment on 11/20/23.  Did not make it to Dr. Caballero's visit  (podiatry).  High risk for toe and foot complications, including amputations, which I did discuss with her again today.  Ms. Araiza voices she is overwhelmed with , mother, and son's health issues.  States she is taking care of herself to the best of her ability.  Denies depression, SI, and HI.      Encounter for routine diabetic foot care, without insulin:   Stopped taking Ozempic and Metformin due to s/e.  S/P right 3rd toe amputation @ [facility] on 10/5/2022 due to osteomyelitis.  Diabetes had been well-controlled with Metformin and Ozempic, which she is no longer taking.  Diabetic foot care teaching reinforced, including prompt tx for any skin breakdown in toes/feet, with rationale.     Last visit with PCP in [facility] was on:  8/16/23 (Dr. Prachi Donald)  Q7 qualifier with hx of amputated toe    Right foot deformity:  MRI ruled out Charcot foot, per Dr. Caballero, podiatry.  Foot with significant arthritic bony deformities.   Loss of intrinsic mucle ROM bilaterally.   Surgical off-loading by Dr. Caballero on hold for now, due to 's ongoing health issues.      Left foot deformity with chronic edema:  Has been evaluated by Dr. Caballero, who did two xrays.  He had been following her for feet.      Mycotic Toenails:  Significant recurrence of mycotic nails of right foot.  Prescribed CMPD Voricon 2.67%/Vanc 6.67%, and Cipro 2% nail suspension in DSMO twice/day, which she admits to not applying consistently.  Script being filled by [facility].                                                 PROCEDURE NOTE    Procedure Today:   cutting and filing of of 5 toenails; debridement of 4 toenails.   Rationale:  Overgrown and infected toenails, as patient presented today, can lead to diabetic foot/toe ulcers, osteomyelitis, and lower limb amputations.   Informed verbal consent obtained.  Using standard podiatry clippers, Damionel, and [place] boards, I cut and filed 5 toenails.  Using podiatry clippers, Dremmel, and  carolina boards--I excised infected nail from 4 right toenails.  No bleeding or discomfort during procedure.  Patient tolerated procedure without complications.      Calluses of right 2nd and 3rd plantar met heads, as well as to dorsal right 4th toe:  Procedure Today: paring and sanding of 3 calluses  Rationale: Calluses can lead to wounds, cellulitis, osteomyelitis, and lower limb amputations.     Informed verbal consent obtained.   Using #4 dermal curette, Dremmel, and YeahMobi boards, I pared and sanded 3 calluses to intact tissue. There were symptoms in right plantar foot ulcers consistent with impending ulceration, had calluses not been shaved today.  No bleeding or discomfort with procedure.     Xerosis of bilateral feet:  Self-admits to not applying Lac-Hydrin consistently.    Prescribed Lac-Hydrin 12%, followed by thin layer of Vaseline twice/day.   CPM    Hammertoes:  Severe deformity of right 2nd toe.    Previously followed by Dr. Caballero, podiatrist.       Has not f/u with Dr. Caballero for ongoing care.                  Return to clinic in three months for routine diabetic foot care.  Teaching reinforced on s/s to call wound clinic for promptly.

## 2024-09-20 ENCOUNTER — HOSPITAL ENCOUNTER (OUTPATIENT)
Dept: WOUND CARE | Facility: HOSPITAL | Age: 59
Discharge: HOME | End: 2024-09-20
Attending: FAMILY MEDICINE

## 2024-09-20 VITALS
DIASTOLIC BLOOD PRESSURE: 80 MMHG | TEMPERATURE: 98 F | OXYGEN SATURATION: 97 % | SYSTOLIC BLOOD PRESSURE: 124 MMHG | HEART RATE: 81 BPM

## 2024-09-20 DIAGNOSIS — E11.9 ENCOUNTER FOR DIABETIC FOOT EXAM: Primary | ICD-10-CM

## 2024-09-20 DIAGNOSIS — L60.3 DYSTROPHIC NAIL: ICD-10-CM

## 2024-09-20 DIAGNOSIS — L84 PRE-ULCERATIVE CORN OR CALLOUS: ICD-10-CM

## 2024-09-20 PROCEDURE — 11056 PARNG/CUTG B9 HYPRKR LES 2-4: CPT | Mod: ,,, | Performed by: FAMILY MEDICINE

## 2024-09-20 PROCEDURE — 99211 OFF/OP EST MAY X REQ PHY/QHP: CPT

## 2024-09-20 PROCEDURE — 11056 PARNG/CUTG B9 HYPRKR LES 2-4: CPT

## 2024-09-20 PROCEDURE — 27000999 HC MEDICAL RECORD PHOTO DOCUMENTATION

## 2024-09-20 PROCEDURE — 11720 DEBRIDE NAIL 1-5: CPT | Mod: 59,,, | Performed by: FAMILY MEDICINE

## 2024-09-20 PROCEDURE — 11720 DEBRIDE NAIL 1-5: CPT

## 2024-09-20 NOTE — PROGRESS NOTES
CHIEF COMPLAINT:  Chief Complaint   Patient presents with    Diabetes     [facility]     HISTORY OF  PRESENT ILLNESS:  59 y.o. White female being seen today for diabetic foot care.  Patient has diabetes and is currently not on medications.  She stopped all medications because she was not feeling well while on medications.  Her last hemoglobin A1c was over a year ago and it was 6.1.  She has right foot bunion with callus formation.  She was previously seen by Dr. Espinosa podiatrist and does not wish to have surgery and will not be following up with him.  She had a right 3rd digit amputation, she has hammertoe at the 2nd and 4th digits on the right.    REVIEW OF SYSTEMS:  Review of Systems   Constitutional:  Negative for chills and fever.   Respiratory:  Negative for cough.    Gastrointestinal:  Negative for nausea.   Musculoskeletal:         As stated in HPI   Skin:         As stated in HPI   Psychiatric/Behavioral: Negative.         Past Medical History:   Diagnosis Date    Acquired deformity of right foot 10/10/2022    Acquired hammertoes of both feet 10/10/2022    History of osteomyelitis 10/10/2022    Noncompliance with medication regimen 8/29/2023    Open wound of lesser toe of right foot 10/10/2022    Primary hypertension 10/10/2022    Status post amputation of lesser toe of right foot 10/10/2022    Type 2 diabetes mellitus with skin complication, without long-term current use of insulin 10/10/2022      Past Surgical History:   Procedure Laterality Date    TOE AMPUTATION Right 10/5/2022    Procedure: AMPUTATION, right third toe;  Surgeon: Glen Roth MD;  Location: [facility];  Service: General;  Laterality: Right;      Social History     Socioeconomic History    Marital status:    Tobacco Use    Smoking status: Never     Passive exposure: Never    Smokeless tobacco: Never   Substance and Sexual Activity    Alcohol use: Not Currently    Drug use: Never     Review of Most Recent Wound Care-Related Labs:  Lab  Results   Component Value Date/Time    WBC 5.3 10/07/2022 03:11 AM    RBC 4.28 10/07/2022 03:11 AM    HGB 12.9 10/07/2022 03:11 AM    HCT 37.3 10/07/2022 03:11 AM    MCV 87.1 10/07/2022 03:11 AM    MCH 30.1 10/07/2022 03:11 AM    CREATININE 0.64 02/14/2023 09:15 AM    HGBA1C 6.1 02/14/2023 09:15 AM    .00 (H) 10/02/2022 01:27 PM        Wound-Related Imaging:      PHYSICAL EXAMINATION:  Blood pressure 124/80, pulse 81, temperature 98.1 °F (36.7 °C), SpO2 97%.  General:  VSS, afebrile. No acute distress.   Respiratory: Non labored.  No coughing.  Cardiovascular:  No peripheral edema  Musculoskeletal:  Right great toe bunion  Neurologic:  A&O X 3.    Psychiatric:  Calm, cooperative.  Mood and effect normal.  Responses appropriate.   Integumentary:       Monofilament exam:  Abnormal bilaterally, reduced sensation in the digits and lateral to the ball of the foot b/l  Dorsalis pedis and posterior tibial pulses present by Doppler   Callus at the base of the right great toe and medial great toe pared down with curette  Callus at the medial aspect of the left great toe pared down with curette  Proprioception intact bilaterally  Right foot 3rd digit amputation  Nail debridement done on dystrophic nails bilateral great toes done with Dremel     Protective Sensation (w/ 10 gram monofilament):  Right: Decreased  Left: Decreased    Visual Inspection:  Right great toe bunion, right 2nd and 4th digit hammertoe    Pedal Pulses:   Right: Present  Left: Present    Posterior Tibialis Pulses:   Right:Present  Left: Present      ASSESSMENT/PLAN:    Patient Active Problem List    Diagnosis Date Noted    Encounter for diabetic foot exam 09/20/2024    Noncompliance with medication regimen 08/29/2023    Encounter for comprehensive diabetic foot examination, type 2 diabetes mellitus 05/22/2023    Overgrown toenails 02/20/2023    Pressure injury of toe of right foot, stage 1 12/22/2022    Hyperlipidemia 11/03/2022    Charcot foot due  to diabetes mellitus 11/03/2022    Encounter for screening for malignant neoplasm of breast 10/19/2022    History of osteomyelitis 10/10/2022    Type 2 diabetes mellitus with skin complication, without long-term current use of insulin 10/10/2022    Status post amputation of lesser toe of right foot 10/10/2022    Open wound of lesser toe of right foot 10/10/2022    Primary hypertension 10/10/2022    Acquired deformity of right foot 10/10/2022    Acquired hammertoes of both feet 10/10/2022       Diabetic foot care   Diabetes-questionable whether it is controlled or not as she is not taking any medications and not following up with physician   Return to clinic in 3 months

## 2024-11-20 ENCOUNTER — TELEPHONE (OUTPATIENT)
Dept: FAMILY MEDICINE | Facility: CLINIC | Age: 59
End: 2024-11-20

## 2024-11-20 NOTE — TELEPHONE ENCOUNTER
----- Message from Esme sent at 11/19/2024  2:56 PM CST -----  Called pt to schedule A1C appt with pcp and the pt stated she does not want an appt. She stated that she no longer has insurance and has to many medical bills at this time, thank you.

## 2025-01-17 ENCOUNTER — OFFICE VISIT (OUTPATIENT)
Dept: FAMILY MEDICINE | Facility: CLINIC | Age: 60
End: 2025-01-17
Payer: COMMERCIAL

## 2025-01-17 VITALS
BODY MASS INDEX: 29.92 KG/M2 | TEMPERATURE: 98 F | RESPIRATION RATE: 18 BRPM | HEIGHT: 67 IN | WEIGHT: 190.63 LBS | OXYGEN SATURATION: 98 % | SYSTOLIC BLOOD PRESSURE: 145 MMHG | DIASTOLIC BLOOD PRESSURE: 84 MMHG | HEART RATE: 64 BPM

## 2025-01-17 DIAGNOSIS — E11.69 TYPE 2 DIABETES MELLITUS WITH OTHER SPECIFIED COMPLICATION, UNSPECIFIED WHETHER LONG TERM INSULIN USE: Primary | ICD-10-CM

## 2025-01-17 DIAGNOSIS — Z91.148 NONCOMPLIANCE WITH MEDICATION REGIMEN: ICD-10-CM

## 2025-01-17 DIAGNOSIS — I10 PRIMARY HYPERTENSION: ICD-10-CM

## 2025-01-17 PROBLEM — E11.628 TYPE 2 DIABETES MELLITUS WITH SKIN COMPLICATION, WITHOUT LONG-TERM CURRENT USE OF INSULIN: Status: RESOLVED | Noted: 2022-10-10 | Resolved: 2025-01-17

## 2025-01-17 PROBLEM — E11.9 ENCOUNTER FOR COMPREHENSIVE DIABETIC FOOT EXAMINATION, TYPE 2 DIABETES MELLITUS: Status: RESOLVED | Noted: 2023-05-22 | Resolved: 2025-01-17

## 2025-01-17 PROBLEM — E11.9 ENCOUNTER FOR DIABETIC FOOT EXAM: Status: RESOLVED | Noted: 2024-09-20 | Resolved: 2025-01-17

## 2025-01-17 LAB
CREAT UR-MCNC: 82 MG/DL (ref 45–106)
HBA1C MFR BLD: 12.3 %
MICROALBUMIN UR-MCNC: 10.4 UG/ML
MICROALBUMIN/CREAT RATIO PNL UR: 12.7 MG/GM CR (ref 0–30)

## 2025-01-17 PROCEDURE — 99214 OFFICE O/P EST MOD 30 MIN: CPT | Mod: PBBFAC

## 2025-01-17 PROCEDURE — 82043 UR ALBUMIN QUANTITATIVE: CPT

## 2025-01-17 PROCEDURE — 83036 HEMOGLOBIN GLYCOSYLATED A1C: CPT | Mod: PBBFAC

## 2025-01-17 RX ORDER — INSULIN GLARGINE 100 [IU]/ML
10 INJECTION, SOLUTION SUBCUTANEOUS NIGHTLY
Qty: 36 ML | Refills: 0 | Status: SHIPPED | OUTPATIENT
Start: 2025-01-17 | End: 2026-01-17

## 2025-01-17 RX ORDER — DEXTROSE 4 G
TABLET,CHEWABLE ORAL
Qty: 1 EACH | Refills: 1 | Status: SHIPPED | OUTPATIENT
Start: 2025-01-17

## 2025-01-17 RX ORDER — LANCETS
EACH MISCELLANEOUS
Qty: 100 EACH | Refills: 11 | Status: SHIPPED | OUTPATIENT
Start: 2025-01-17

## 2025-01-17 NOTE — PROGRESS NOTES
"[facility] Office Visit Note    Subjective:       Patient ID: Maribel Araiza is a 59 y.o. female.    HPI:  59 y.o. female presents to [facility] clinic for f/u DM, HTN.    She has been under more stress due to her  who had stroke 9/2022 starting to have seizures lately. Also has 30 year old child with TBI back in 2004 living at home with her. She feels that there are too many people at home she needs to take care of. Mood is stable, denies SI/HI, anhedonia. Coping mechanism includes coloring on her tablet or on paper. Social support from her other children and also grandchildren. Declines counseling referral today.   Has access to [facility] for counseling, as her  uses that service.   PHQ-9: 3, MATT-7: 1 today in office.     DM  - states FBG was 265 this AM. Does not bring BG logs.   - self-discontinued metformin (leg swelling, extreme fatigue), Ozempic (neck pain and fatigue)  - she is fine with taking insulin (as it is natural) if she must.  - LDL not at goal of <100 (132 in 2/2023). Never been on statin. Refuses. States she would consider statin if LDL > 190, or if she has a stroke or MI    HTN  - declines any pharmacotherapy   - states she may consider meds if she starts having symptoms  - currently denies dizziness, lightheadedness, CP, SOB, nausea, vomiting  - not sure how high her bp should get before she will consider meds  - self-dced losartan (back pain, went away once she stopped taking)      Health Maintenance  Pap Smear: declined today, amenable next visit  Mammogram: 12/2022, bi-rads 1 bilat. Due.   Colon Cancer: cologard 3/2023 neg    Review of Systems:  See HPI.    Objective:      BP (!) 145/84 (BP Location: Right arm, Patient Position: Sitting)   Pulse 64   Temp 98 °F (36.7 °C) (Oral)   Resp 18   Ht 5' 7.01" (1.702 m)   Wt 86.5 kg (190 lb 9.6 oz)   SpO2 98%   BMI 29.85 kg/m²   Physical Exam    General: appears well, in no acute distress   Eye: no scleral icterus "   HENT: MMM, oropharynx without erythema/exudate   Neck: supple, no lymphadenopathy, no carotid bruits   Respiratory: clear to auscultation bilaterally, nonlabored respirations   Cardiovascular: regular rate and rhythm without murmurs or gallops, no edema in bilateral lower extremities   Gastrointestinal: soft, non-tender, non-distended, bowel sounds present   Genitourinary: no suprapubic tenderness   Musculoskeletal: no gross deformities observed   Integumentary: no acute rashes or skin lesions observed    Neuro: No focal lesions observed         Current Outpatient Medications   Medication Instructions    blood sugar diagnostic Strp E11.69 Check blood glucose in the morning and 2 hours after your largest meal.    blood-glucose meter Misc E11.69 Check blood glucose in the morning and 2 hours after your largest meal.    lancets (ACCU-CHEK MULTICLIX LANCET) Misc E11.69 Check blood glucose in the morning and 2 hours after your largest meal.    LANTUS SOLOSTAR U-100 INSULIN 10 Units, Subcutaneous, Nightly        Assessment/Plan:     1. Type 2 diabetes mellitus with other specified complication, unspecified whether long term insulin use  POCT HEMOGLOBIN A1C    Microalbumin/Creatinine Ratio, Urine    Microalbumin/Creatinine Ratio, Urine    blood sugar diagnostic Strp    blood-glucose meter Misc    lancets (ACCU-CHEK MULTICLIX LANCET) Misc    insulin glargine U-100, Lantus, (LANTUS SOLOSTAR U-100 INSULIN) 100 unit/mL (3 mL) InPn pen    Ambulatory referral/consult to Diabetes Education      2. Primary hypertension        3. Noncompliance with medication regimen          - A1c today 12.3 in office. After much discussion she agrees to insulin. START Lantus 10 units qHs. BG logs provided, as well as supplies. Goal BG ranges discussed. Review BG logs next visit. She also agrees to DM education.     - known resistance to pharmacotherapy. Discussions and education ongoing. She needs at least medium intensity statin due to DM and  LDL not at goal. Hypertension with BP not at goal. Would benefit from ACEi/ARB.   Low Sodium Diet (DASH Diet - Less than 2 grams of sodium per day, high in fruits, vegetables, potassium, calcium, and magnesium; low in fat).  Monitor blood pressure daily and log. BP logs provided. Report consistent numbers greater than 140/90.  Maintain healthy weight with goal BMI <30. Exercise 30 minutes per day, 5 days per week.   Limit alcohol consumption to 2 drinks or less per day for men and 1 drink or less per day for women    - she refused labs today and declines follow up sooner than 3 months.     RTC in 3 months for routine visit. Labs and Pap next time. Discuss vaccinations next time.

## 2025-02-06 NOTE — PROGRESS NOTES
"[facility] Office Visit Note    Subjective:       Patient ID: Maribel Araiza is a 59 y.o. female.    HPI:  59 y.o. female presents to [facility] clinic for BG check after starting insulin therapy 1/17/25. States at the beginning of the visit that she can only stay 10 minutes as she has to  her grandson from school.     A1c last visit 12.3 so Lantus 10 qHs was started at the time.  Brings BG logs, FBG 220s-280s. 2 hours after meal 230s-370s. Her daughter in law passed unexpectedly 2 weeks ago, so she skipped insulin for a week, and did not log her BGs during that time.   Hx of DM, self-discontinued metformin (leg swelling, extreme fatigue), Ozempic (neck pain and fatigue).   Diabetes education referral was placed last visit, not done.   LDL not at goal of <100 (132 in 2/2023). Refused statin. Only willing to consider statin if LDL > 190, or if she has a stroke or MI.    PMH:  DM- as above  HTN- declines any pharmacotherapy, states may consider meds if she starts having symptoms, self-dced losartan (back pain, went away once she stopped taking)  Life stressors- caretaker of  had stroke 9/2022 now new onset seizures, and 30 year old child with TBI back in 2004 living at home with her. Declined counseling referral, has access to [facility] for counseling, as her  uses that service. PHQ-9: 3, MATT-7: 1 on 1/17/25.    Health Maintenance  Pap Smear: declined today, amenable next visit   Mammogram: 12/2022, bi-rads 1 bilat. Due. Declines for now  Colon Cancer: cologard 3/2023 neg  Never smoker    Review of Systems:  See HPI.    Objective:      /81 (BP Location: Right arm, Patient Position: Sitting)   Pulse 84   Temp 98.5 °F (36.9 °C) (Oral)   Resp 20   Ht 5' 7" (1.702 m)   Wt 86.5 kg (190 lb 9.6 oz)   SpO2 98%   BMI 29.85 kg/m²   Physical Exam    General: appears well, in no acute distress   Eye: no scleral icterus   HENT: MMM, oropharynx without erythema/exudate   Neck: supple, " no lymphadenopathy, no carotid bruits   Respiratory: clear to auscultation bilaterally, nonlabored respirations   Cardiovascular: regular rate and rhythm without murmurs or gallops, no edema in bilateral lower extremities   Gastrointestinal: soft, non-tender, non-distended, bowel sounds present   Genitourinary: no suprapubic tenderness   Musculoskeletal: no gross deformities observed   Integumentary: no acute rashes or skin lesions observed    Neuro: No focal lesions observed     Current Outpatient Medications   Medication Instructions    blood sugar diagnostic Strp E11.69 Check blood glucose in the morning and 2 hours after your largest meal.    blood-glucose meter Misc E11.69 Check blood glucose in the morning and 2 hours after your largest meal.    lancets (ACCU-CHEK MULTICLIX LANCET) Misc E11.69 Check blood glucose in the morning and 2 hours after your largest meal.    LANTUS SOLOSTAR U-100 INSULIN 10 Units, Subcutaneous, Nightly        Assessment/Plan:     1. Type 2 diabetes mellitus with other specified complication, with long-term current use of insulin          - BG logs reviewed, not under control. INCREASE Lantus to 15 units qHs. Continue BG logs. Goal BG ranges discussed. Review BG logs next visit.     - she refused labs today and plans to get them in 3 months.     RTC in 1-2 weeks BG check. Labs and Pap in 3 months (per patient wishes).

## 2025-02-07 ENCOUNTER — OFFICE VISIT (OUTPATIENT)
Dept: FAMILY MEDICINE | Facility: CLINIC | Age: 60
End: 2025-02-07
Payer: COMMERCIAL

## 2025-02-07 VITALS
TEMPERATURE: 99 F | HEART RATE: 84 BPM | BODY MASS INDEX: 29.92 KG/M2 | RESPIRATION RATE: 20 BRPM | HEIGHT: 67 IN | DIASTOLIC BLOOD PRESSURE: 81 MMHG | OXYGEN SATURATION: 98 % | WEIGHT: 190.63 LBS | SYSTOLIC BLOOD PRESSURE: 132 MMHG

## 2025-02-07 DIAGNOSIS — E11.69 TYPE 2 DIABETES MELLITUS WITH OTHER SPECIFIED COMPLICATION, WITH LONG-TERM CURRENT USE OF INSULIN: Primary | ICD-10-CM

## 2025-02-07 DIAGNOSIS — E11.69 TYPE 2 DIABETES MELLITUS WITH OTHER SPECIFIED COMPLICATION, UNSPECIFIED WHETHER LONG TERM INSULIN USE: ICD-10-CM

## 2025-02-07 DIAGNOSIS — Z79.4 TYPE 2 DIABETES MELLITUS WITH OTHER SPECIFIED COMPLICATION, WITH LONG-TERM CURRENT USE OF INSULIN: Primary | ICD-10-CM

## 2025-02-07 PROCEDURE — 99213 OFFICE O/P EST LOW 20 MIN: CPT | Mod: PBBFAC

## 2025-02-07 RX ORDER — PEN NEEDLE, DIABETIC 30 GX3/16"
15 NEEDLE, DISPOSABLE MISCELLANEOUS NIGHTLY
Qty: 30 EACH | Refills: 11 | Status: SHIPPED | OUTPATIENT
Start: 2025-02-07

## 2025-02-07 RX ORDER — INSULIN GLARGINE 100 [IU]/ML
15 INJECTION, SOLUTION SUBCUTANEOUS NIGHTLY
Qty: 54 ML | Refills: 0 | Status: SHIPPED | OUTPATIENT
Start: 2025-02-07 | End: 2026-02-07

## 2025-02-18 NOTE — PROGRESS NOTES
Faculty Attestation    I have reveiwed and agree with the resident's findings, including all diagnostic interpretations and plans as written.    I have discussed with resident. Patient hesitant for antihyperglycemic medications. Patient will need frequent titration of insulin regimen for improved control. Schedule patient for appt followup in 2/25    Clarissa Del Angel MD

## 2025-04-13 ENCOUNTER — OFFICE VISIT (OUTPATIENT)
Dept: URGENT CARE | Facility: CLINIC | Age: 60
End: 2025-04-13
Payer: COMMERCIAL

## 2025-04-13 VITALS
BODY MASS INDEX: 29.82 KG/M2 | WEIGHT: 190 LBS | RESPIRATION RATE: 18 BRPM | DIASTOLIC BLOOD PRESSURE: 98 MMHG | HEART RATE: 97 BPM | OXYGEN SATURATION: 97 % | HEIGHT: 67 IN | SYSTOLIC BLOOD PRESSURE: 157 MMHG | TEMPERATURE: 98 F

## 2025-04-13 DIAGNOSIS — E11.621 DIABETIC ULCER OF RIGHT MIDFOOT ASSOCIATED WITH TYPE 2 DIABETES MELLITUS, UNSPECIFIED ULCER STAGE: Primary | ICD-10-CM

## 2025-04-13 DIAGNOSIS — L97.419 DIABETIC ULCER OF RIGHT MIDFOOT ASSOCIATED WITH TYPE 2 DIABETES MELLITUS, UNSPECIFIED ULCER STAGE: Primary | ICD-10-CM

## 2025-04-13 PROCEDURE — 99214 OFFICE O/P EST MOD 30 MIN: CPT | Mod: S$PBB,,, | Performed by: FAMILY MEDICINE

## 2025-04-13 PROCEDURE — 99213 OFFICE O/P EST LOW 20 MIN: CPT | Mod: PBBFAC | Performed by: FAMILY MEDICINE

## 2025-04-13 NOTE — PROGRESS NOTES
"Subjective:       Patient ID: Maribel Araiza is a 59 y.o. female.    Chief Complaint: Foot Injury (Pt c/o Rt foot injury x yesterday )      Foot Injury       59-year-old female with wound to right foot.  She noticed it yesterday.  Patient has a past medical history of diabetes.  Has had a toe amputated in the past.  Denies pain at site.  Review of Systems   Integumentary:         As above         Objective:       Vital Signs  Temp: 98 °F (36.7 °C)  Pulse: 97  Resp: 18  SpO2: 97 %  BP: (!) 157/98  BP Location: Left arm  Patient Position: Sitting  Height and Weight  Height: 5' 7" (170.2 cm)  Weight: 86.2 kg (190 lb)  BSA (Calculated - sq m): 2.02 sq meters  BMI (Calculated): 29.8  Weight in (lb) to have BMI = 25: 159.3]  Physical Exam  Vitals reviewed.   Constitutional:       Appearance: Normal appearance.   HENT:      Head: Normocephalic and atraumatic.   Eyes:      Extraocular Movements: Extraocular movements intact.      Conjunctiva/sclera: Conjunctivae normal.   Cardiovascular:      Rate and Rhythm: Normal rate and regular rhythm.      Heart sounds: Normal heart sounds.   Pulmonary:      Breath sounds: Normal breath sounds.   Skin:     Comments: Right plantar wound as shown in picture   Neurological:      General: No focal deficit present.      Mental Status: She is alert.   Psychiatric:         Mood and Affect: Mood and affect normal.         Speech: Speech normal.         Behavior: Behavior normal. Behavior is cooperative.         Thought Content: Thought content does not include homicidal or suicidal ideation.           Assessment:       Problem List Items Addressed This Visit    None  Visit Diagnoses         Diabetic ulcer of right midfoot associated with type 2 diabetes mellitus, unspecified ulcer stage    -  Primary    Relevant Orders    Ambulatory referral/consult to Wound Clinic            Plan:   Follow-up with Wound Care Clinic as scheduled  Monitor for signs of surrounding infection including redness, " heat, pain, and exudate  ER precautions   Follow-up with PCP and Wound Care Clinic

## (undated) DEVICE — SYR 10CC LUER LOCK

## (undated) DEVICE — GLOVE PROTEXIS BLUE LATEX 7.5

## (undated) DEVICE — DRAPE EXTREMITY STD 89X128IN

## (undated) DEVICE — GLOVE PROTEXIS BLUE LATEX 7

## (undated) DEVICE — SOLIDIFIER BOTTLE 1500CC

## (undated) DEVICE — GLOVE PROTEXIS LTX MICRO 8

## (undated) DEVICE — SUT ETHILON 2-0 FS 18IN BLK

## (undated) DEVICE — GLOVE PROTEXIS LTX MICRO  7.5

## (undated) DEVICE — TRAY SKIN SCRUB WET PREMIUM

## (undated) DEVICE — GOWN POLY REINF BRTH SLV XL

## (undated) DEVICE — DRESSING XEROFORM 5X9IN

## (undated) DEVICE — GAUZE SPONGE 4X4 12PLY

## (undated) DEVICE — SEE MEDLINE ITEM 146322

## (undated) DEVICE — GLOVE PROTEXIS BLUE LATEX 8

## (undated) DEVICE — WAX BONE STERILE 2.5G

## (undated) DEVICE — MANIFOLD 4 PORT

## (undated) DEVICE — KIT BASIC ORTHO UNIVERSITY

## (undated) DEVICE — SUT 2/0 36IN COATED VICRYL

## (undated) DEVICE — SOL 9P NACL IRR PIC IL

## (undated) DEVICE — HANDLE DEVON RIGID OR LIGHT